# Patient Record
Sex: FEMALE | Race: OTHER | Employment: UNEMPLOYED | URBAN - METROPOLITAN AREA
[De-identification: names, ages, dates, MRNs, and addresses within clinical notes are randomized per-mention and may not be internally consistent; named-entity substitution may affect disease eponyms.]

---

## 2017-01-02 ENCOUNTER — OFFICE VISIT (OUTPATIENT)
Dept: FAMILY MEDICINE CLINIC | Age: 62
End: 2017-01-02

## 2017-01-02 ENCOUNTER — HOSPITAL ENCOUNTER (OUTPATIENT)
Dept: LAB | Age: 62
Discharge: HOME | End: 2017-01-02

## 2017-01-02 VITALS
SYSTOLIC BLOOD PRESSURE: 153 MMHG | HEART RATE: 79 BPM | TEMPERATURE: 98.2 F | DIASTOLIC BLOOD PRESSURE: 60 MMHG | HEIGHT: 60 IN | BODY MASS INDEX: 38.48 KG/M2 | WEIGHT: 196 LBS

## 2017-01-02 DIAGNOSIS — I10 ESSENTIAL HYPERTENSION: ICD-10-CM

## 2017-01-02 DIAGNOSIS — R60.9 EDEMA, UNSPECIFIED TYPE: ICD-10-CM

## 2017-01-02 DIAGNOSIS — K74.60 CIRRHOSIS OF LIVER WITH ASCITES, UNSPECIFIED HEPATIC CIRRHOSIS TYPE (HCC): Primary | ICD-10-CM

## 2017-01-02 DIAGNOSIS — Z71.89 COUNSELING AND COORDINATION OF CARE: Primary | ICD-10-CM

## 2017-01-02 DIAGNOSIS — Z13.9 SCREENING: ICD-10-CM

## 2017-01-02 DIAGNOSIS — R18.8 CIRRHOSIS OF LIVER WITH ASCITES, UNSPECIFIED HEPATIC CIRRHOSIS TYPE (HCC): Primary | ICD-10-CM

## 2017-01-02 LAB
ANION GAP BLD CALC-SCNC: 8 MMOL/L (ref 5–15)
BUN SERPL-MCNC: 14 MG/DL (ref 6–20)
BUN/CREAT SERPL: 27 (ref 12–20)
CALCIUM SERPL-MCNC: 8 MG/DL (ref 8.5–10.1)
CHLORIDE SERPL-SCNC: 113 MMOL/L (ref 97–108)
CO2 SERPL-SCNC: 22 MMOL/L (ref 21–32)
CREAT SERPL-MCNC: 0.52 MG/DL (ref 0.55–1.02)
GLUCOSE SERPL-MCNC: 93 MG/DL (ref 65–100)
HGB BLD-MCNC: 9.4 G/DL
POTASSIUM SERPL-SCNC: 4.1 MMOL/L (ref 3.5–5.1)
SODIUM SERPL-SCNC: 143 MMOL/L (ref 136–145)

## 2017-01-02 PROCEDURE — 80048 BASIC METABOLIC PNL TOTAL CA: CPT | Performed by: NURSE PRACTITIONER

## 2017-01-02 RX ORDER — MEGESTROL ACETATE 20 MG/1
20 TABLET ORAL 2 TIMES DAILY
COMMUNITY
Start: 2016-11-10 | End: 2017-01-02 | Stop reason: SDUPTHER

## 2017-01-02 RX ORDER — MEGESTROL ACETATE 20 MG/1
20 TABLET ORAL 2 TIMES DAILY
Qty: 60 TAB | Refills: 2 | Status: SHIPPED | OUTPATIENT
Start: 2017-01-02 | End: 2017-04-13

## 2017-01-02 RX ORDER — NADOLOL 40 MG/1
40 TABLET ORAL DAILY
Qty: 30 TAB | Refills: 2 | Status: SHIPPED | OUTPATIENT
Start: 2017-01-02 | End: 2017-02-02 | Stop reason: SDUPTHER

## 2017-01-02 RX ORDER — FUROSEMIDE 40 MG/1
40 TABLET ORAL DAILY
Qty: 30 TAB | Refills: 2 | Status: SHIPPED | OUTPATIENT
Start: 2017-01-02 | End: 2017-02-16 | Stop reason: SDUPTHER

## 2017-01-02 RX ORDER — SPIRONOLACTONE 100 MG/1
100 TABLET, FILM COATED ORAL DAILY
Qty: 30 TAB | Refills: 2 | Status: SHIPPED | OUTPATIENT
Start: 2017-01-02 | End: 2017-02-16 | Stop reason: SDUPTHER

## 2017-01-02 RX ORDER — NADOLOL 40 MG/1
40 TABLET ORAL DAILY
COMMUNITY
Start: 2016-11-10 | End: 2017-01-02 | Stop reason: SDUPTHER

## 2017-01-02 RX ORDER — SPIRONOLACTONE 100 MG/1
100 TABLET, FILM COATED ORAL DAILY
COMMUNITY
Start: 2016-11-10 | End: 2017-01-02 | Stop reason: SDUPTHER

## 2017-01-02 NOTE — PROGRESS NOTES
Met with Donnell Lee and explained the Care Card and gave her APA number to call for screening.   Pavithra Christie

## 2017-01-02 NOTE — PROGRESS NOTES
Printed AVS, provided to pt and reviewed. Pt indicated understanding and had no questions. Told pt that rx's have been sent to pharmacy and they should be ready for  in approximately 2 hrs. Please present GoodRx. com coupon which we provide to your pharmacy to receive discounted price. Told pt to ask about the care card which is our financial assistance program.  Also told pt that they will be required to do a financial screening and that they will receive a phone call from a company named [employer]. Advised them that they must talk to this company in order to qualify for the care card so it is very important not to avoid this phone call. Also told them that if they get a bill before they get their card to call the phone # on the bill to let them know they have applied for the Care Card. Charu ARTIS assisted with the info regarding the medical bills. Pt given Dr Abilio Smiley name and number and referral she was instructed to call and make an appt. The Hepatology office closed today for the Pol[place].  Lanny Canela RN

## 2017-01-02 NOTE — PROGRESS NOTES
Coordination of Care  1. Have you been to the ER, urgent care clinic since your last visit? Hospitalized since your last visit? Yes Reason for visit: [facility] Hx, Blood cancer, 11/2016    2. Have you seen or consulted any other health care providers outside of the [address] [place] [address] since your last visit? Include any pap smears or colon screening.  No    Medications  Medication Reconciliation Performed: no  Patient does not need refills     Learning Assessment Complete? yes  Results for orders placed or performed in visit on 01/02/17   AMB POC HEMOGLOBIN (HGB)   Result Value Ref Range    Hemoglobin (POC) 9.4

## 2017-01-02 NOTE — PROGRESS NOTES
Subjective:     Chief Complaint   Patient presents with    Medication Refill        She  is a 64 y.o. female who presents for evaluation of post hospitalization follow-up from ED visit. Pt reports she was transferred to [facility] for evaluation of possible ALL/vaginal bleeding. Additional workup revealed Pt's S&S were from Hep C cirrhosis and had a paracentesis done where they removed 2.5L. Pt notes she feels better since hospitalization but is still fatigued. No SOB/chest pain, N/V, coffee ground emesis nor dyspepsia. Expresses significant concern r/t to the medical bills. Pt is also requesting refills. ROS  Gen - no fever/chills  Resp - no dyspnea or cough  CV - no chest pain or CUENCA  Rest per HPI    Past Medical History   Diagnosis Date    Neurological disorder      Past Surgical History   Procedure Laterality Date    Hx cholecystectomy      Hx gyn            Current Outpatient Prescriptions on File Prior to Visit   Medication Sig Dispense Refill    furosemide (LASIX) 40 mg tablet Take 1 pill twice a day 60 Tab 1    potassium chloride (K-DUR, KLOR-CON) 10 mEq tablet Take 1 Tab by mouth two (2) times a day. 60 Tab 0    albuterol (PROVENTIL HFA, VENTOLIN HFA, PROAIR HFA) 90 mcg/actuation inhaler Take 2 Puffs by inhalation every six (6) hours as needed for Wheezing. 1 Inhaler 11    acetaminophen (TYLENOL ARTHRITIS PAIN) 650 mg CR tablet Take 1 Tab by mouth every eight (8) hours as needed for Pain. 30 Tab 1     No current facility-administered medications on file prior to visit.          Objective:     Vitals:    17 1119 17 1123   BP: 156/74 153/60   Pulse: 77 79   Temp: 98.2 °F (36.8 °C)    TempSrc: Oral    Weight: 196 lb (88.9 kg)    Height: 5' 0.43\" (1.535 m)        Physical Examination:  General appearance - alert, well appearing, and in no distress  Eyes -sclera anicteric  Neck - supple, no significant adenopathy, no thyromegaly, no bruits  Chest - clear to auscultation, no wheezes, rales or rhonchi, symmetric air entry  Heart - normal rate, regular rhythm, 2/6 systolic murmur noted, rubs, clicks or gallops  Neurological - alert, oriented, no focal findings or movement disorder noted  Abdomen- BS present x 4 quad, mild distention/ascites, no tenderness/organomegaly      Assessment/ Plan:   Maranda was seen today for medication refill. Diagnoses and all orders for this visit:    Cirrhosis of liver with ascites, unspecified hepatic cirrhosis type (HCC)  -     megestrol (MEGACE) 20 mg tablet; Take 1 Tab by mouth two (2) times a day. -     REFERRAL TO LIVER HEPATOLOGY    Screening  -     AMB POC HEMOGLOBIN (HGB)    Essential hypertension  -     furosemide (LASIX) 40 mg tablet; Take 1 Tab by mouth daily. -     spironolactone (ALDACTONE) 100 mg tablet; Take 1 Tab by mouth daily. -     nadolol (CORGARD) 40 mg tablet; Take 1 Tab by mouth daily.  -     METABOLIC PANEL, BASIC; Future    Edema, unspecified type  -     furosemide (LASIX) 40 mg tablet; Take 1 Tab by mouth daily. Repeat BMP. Last K+ at [facility] in Nov was WNL. Refill medications. Refer to Hepatology/Dr. Nikita Wong for evaluation of Hep C Tx.      RTC in 4-6 weeks for routine follow-up. Discuss bills w/ SW. I have discussed the diagnosis with the patient and the intended plan as seen in the above orders. The patient has received an after-visit summary and questions were answered concerning future plans. I have discussed medication side effects and warnings with the patient as well. The patient verbalizes understanding and agreement with the plan. Follow-up Disposition:  Return in about 4 weeks (around 1/30/2017).

## 2017-01-02 NOTE — PATIENT INSTRUCTIONS
Aprenda sobre la hepatitis C - [ Learning About Hepatitis C ]  ¿Qué es la hepatitis C? La hepatitis C es lam infección del hígado. Es causada por el virus de la hepatitis C. El virus se transmite a través de la fernanda y de los líquidos corporales infectados. La hepatitis C a menudo se transmite cuando lam persona comparte agujas infectadas utilizadas para inyectarse drogas ilegales. También se puede transmitir cuando lam persona utiliza lam aguja con fernanda infectada. [place] podría ocurrir al [employer] un tatuaje o \"piercing\". O podría suceder al ponerse lam inyección en algunos países en vías de desarrollo en donde se utilizan agujas más de lam vez para jerel inyecciones. En casos raros, lam madre con hepatitis C puede contagiar el virus a tam bebé en el momento del nacimiento. O un trabajador de atención médica puede exponerse accidentalmente a fernanda infectada con hepatitis C. Hay un riesgo muy pequeño de contraer el virus por contacto sexual. El riesgo es mayor si tam ayana sexual también tiene VIH u otra infección de transmisión sexual, o si tiene usted muchas parejas sexuales. Usted no puede infectarse de la hepatitis C debido a un contacto casual. [place] es un contacto ofelia abrazarse, besarse, estornudar, toser y compartir alimentos o bebidas. ¿Qué sucede cuando usted tiene hepatitis C? Algunas personas que contraen hepatitis C la tienen wesly un tiempo corto y Lorri Moreira. [place] se llama hepatitis C aguda. Roman la mayoría de las personas tienen hepatitis C crónica. [place] puede conducir a daño hepático, así ofelia a cirrosis, cáncer de hígado e insuficiencia hepática. Los expertos recomiendan que determinados grupos de personas se buddy la prueba para el virus. [place] incluye a personas que tienen señales de enfermedad del hígado o que alguna vez tejeda compartido agujas al usar drogas ilegales. Pregúntele a tam médico si hacerse la prueba es adecuado para usted.   También puede comprar lam prueba para hacerse en casa llamada \"Home Access Hepatitis C Check kit\" disponible en la mayoría de las farmacias. Si la prueba indica que usted ha estado expuesto al virus en el pasado, asegúrese de hablar con tam médico para averiguar si tiene el virus actualmente. ¿Cuáles son los síntomas? [address] con hepatitis C no tienen síntomas al principio. Los síntomas podrían incluir:  · Cansancio. · Dolor de felicitas. · Músculos adoloridos. · Náuseas. · Dolor en la parte superior derecha del abdomen. · Coloración amarillenta de la piel y de los ojos (ictericia). · Arvel Helms. ¿Cómo se puede prevenir la hepatitis C? No hay vacuna para prevenir la enfermedad. Cualquier persona que tenga hepatitis C puede transmitir el virus a otra persona. Usted puede monika medidas para reducir las probabilidades de infección. · No comparta agujas para inyectarse drogas. · [address] en un entorno de atención de [facility]. Use guantes y ropa de protección. Deseche adecuadamente agujas y otros objetos afilados. · Asegúrese de que todos los instrumentos y los suministros estén esterilizados si se va a hacer un tatuaje, un \"piercing\" o si le Azalee Hollingsworth a hacer acupuntura. Para evitar contagiar la hepatitis C, si la tiene:  · No comparta agujas ni otros enseres, ofelia algodón, cucharas y [place], si [place] agujas para inyectarse drogas. · Mantenga cubiertos los de la cruz, los raspones y las [place]. [place] prevendrá que otras personas entren en contacto con tam fernanda y otros líquidos corporales. Deseche cualquier artículo empapado de fernanda, ofelia, por ejemplo, vendas usadas. · No done fernanda ni semen. · Fatuma y to lo que haya estado en contacto con tam fernanda. Use jabón y [place]. · No comparta tam cepillo de dientes, navajas de afeitar, cortaúñas ni nada que pueda [employer]   · Use condones de látex al [employer] sexuales si tiene VIH, más de [place] ayana sexual o lam infección de transmisión sexual.  ¿Cómo se trata la hepatitis C?  · Si tiene hepatitis C aguda, tam médico probablemente le recete un medicamento. · Si tiene hepatitis C crónica, el tratamiento depende de si tiene daño hepático, otros problemas de sreekanth y la cantidad que tenga de virus en tam organismo y de qué tipo sea. · Usted tendrá que víctor a tam médico con regularidad para [employer] de fernanda a fin de revisarse el hígado. La atención de seguimiento es lam parte clave de tam tratamiento y seguridad. Asegúrese de hacer y acudir a todas las citas, y llame a tam médico si está teniendo problemas. También es lam buena idea saber los resultados de los exámenes y mantener lam lista de los medicamentos que bony. ¿Dónde puede encontrar más información en inglés? Shiva Naranjo a http://constance-brian.info/. Escriba C666 en la búsqueda para aprender más acerca de \"Aprenda sobre la hepatitis C - [ Learning About Hepatitis C ]. \"  Revisado: 24 olson, 2016  Versión del contenido: 11.1  © 9012-6290 Healthwise, Incorporated. Las instrucciones de cuidado fueron adaptadas bajo licencia por [employer] (which disclaims liability or warranty for this information). Si usted tiene [place] afección médica o sobre estas instrucciones, siempre pregunte a tam profesional de sreekanth. Healthwise, Incorporated niega toda garantía o responsabilidad por tam uso de esta información.

## 2017-01-03 NOTE — PROGRESS NOTES
Please let Pt know she does not need to keep taking PO KCL pills her current Lasix and aldactone should maintain the balance. As discussed at [address] this week, strongly encouraged her to make appt with [facility] for Hep C Tx.     Thank you,  Regina Florence

## 2017-01-12 NOTE — PROGRESS NOTES
Received a message from [facility] That the patient returned our call today and she confirmed the phone number as the one we have in [address] [place] [address]. Telephone call made to patient with the assistance of [employer]  #[phone]. We reviewed the provider's lab result note and the patient stated she has not scheduled an appointment with the Sofia Stone yet because she does not have the money and \"it's not bothering me right now. \" Faisal Lopez, [place]

## 2017-01-12 NOTE — PROGRESS NOTES
With the assistance of [employer]  #[id number], attempted to call patient to review lab result note. There was no answer, so a generic message was left to please call the [facility] office.  Delaney Anne RN

## 2017-01-25 ENCOUNTER — TELEPHONE (OUTPATIENT)
Dept: FAMILY MEDICINE CLINIC | Age: 62
End: 2017-01-25

## 2017-01-26 NOTE — TELEPHONE ENCOUNTER
Reviewed meds in Good Rx and w/ discount coupons/card the monthly cost will be approx $75.   Furosemide 40 mg disp 30 =$4, Spironolactone 100mg disp 30=$19.70, Nadolol 40 mg disp 30 = $40, Megestrol 20 mg disp 60 =$10. Attempted to reach  w/ pt. with assistance of [employer] phone  #[phone]. Pt did not answer and  message left w/ [facility] office number for call back.

## 2017-01-31 NOTE — TELEPHONE ENCOUNTER
Tc to the pt using [employer]  #[id number]. There was no answer. The pt was left a message to call the [facility].  Zoila Leventhal, RN

## 2017-02-02 ENCOUNTER — TELEPHONE (OUTPATIENT)
Dept: FAMILY MEDICINE CLINIC | Age: 62
End: 2017-02-02

## 2017-02-02 DIAGNOSIS — I10 ESSENTIAL HYPERTENSION: ICD-10-CM

## 2017-02-02 RX ORDER — NADOLOL 40 MG/1
40 TABLET ORAL DAILY
Qty: 30 TAB | Refills: 2 | Status: SHIPPED | OUTPATIENT
Start: 2017-02-02 | End: 2017-04-13 | Stop reason: SDUPTHER

## 2017-02-02 NOTE — TELEPHONE ENCOUNTER
Pt did not get the medicine Nadolol 40 mg because it was too expensive at approx $40 in [facility]. Asked if it could be sent to a cheaper pharmacy. Good Rx reviewed and Nadolol 40 mg disp 30 is approx $18 in [facility] and or [facility]. I sent to [email or web address] in [employer]. Given pharmacy address and phone number. Pt stated she still has [address] card. Instructed to call [facility] office nurse back for questions/problems. Call assisted by [employer] phone  #[phone].

## 2017-02-02 NOTE — TELEPHONE ENCOUNTER
Phone message left that [facility] office nurse was retuning call about medication problems. Call assisted by [employer] phone  #[phone].

## 2017-02-16 ENCOUNTER — OFFICE VISIT (OUTPATIENT)
Dept: FAMILY MEDICINE CLINIC | Age: 62
End: 2017-02-16

## 2017-02-16 ENCOUNTER — HOSPITAL ENCOUNTER (OUTPATIENT)
Dept: LAB | Age: 62
Discharge: HOME | End: 2017-02-16

## 2017-02-16 VITALS
WEIGHT: 200 LBS | DIASTOLIC BLOOD PRESSURE: 64 MMHG | BODY MASS INDEX: 38.5 KG/M2 | HEART RATE: 57 BPM | TEMPERATURE: 98.2 F | SYSTOLIC BLOOD PRESSURE: 185 MMHG | OXYGEN SATURATION: 100 %

## 2017-02-16 DIAGNOSIS — G89.29 CHRONIC PAIN OF BOTH KNEES: ICD-10-CM

## 2017-02-16 DIAGNOSIS — R60.9 EDEMA, UNSPECIFIED TYPE: ICD-10-CM

## 2017-02-16 DIAGNOSIS — I10 ESSENTIAL HYPERTENSION: ICD-10-CM

## 2017-02-16 DIAGNOSIS — R06.02 SHORTNESS OF BREATH: Primary | ICD-10-CM

## 2017-02-16 DIAGNOSIS — M25.561 CHRONIC PAIN OF BOTH KNEES: ICD-10-CM

## 2017-02-16 DIAGNOSIS — M25.562 CHRONIC PAIN OF BOTH KNEES: ICD-10-CM

## 2017-02-16 DIAGNOSIS — R06.02 SHORTNESS OF BREATH: ICD-10-CM

## 2017-02-16 LAB
ANION GAP BLD CALC-SCNC: 10 MMOL/L (ref 5–15)
BUN SERPL-MCNC: 18 MG/DL (ref 6–20)
BUN/CREAT SERPL: 31 (ref 12–20)
CALCIUM SERPL-MCNC: 7.8 MG/DL (ref 8.5–10.1)
CHLORIDE SERPL-SCNC: 114 MMOL/L (ref 97–108)
CO2 SERPL-SCNC: 19 MMOL/L (ref 21–32)
CREAT SERPL-MCNC: 0.59 MG/DL (ref 0.55–1.02)
GLUCOSE SERPL-MCNC: 92 MG/DL (ref 65–100)
POTASSIUM SERPL-SCNC: 4.6 MMOL/L (ref 3.5–5.1)
SODIUM SERPL-SCNC: 143 MMOL/L (ref 136–145)

## 2017-02-16 PROCEDURE — 80048 BASIC METABOLIC PNL TOTAL CA: CPT | Performed by: PHYSICIAN ASSISTANT

## 2017-02-16 RX ORDER — POTASSIUM CHLORIDE 750 MG/1
10 TABLET, EXTENDED RELEASE ORAL DAILY
Qty: 30 TAB | Refills: 2 | Status: SHIPPED | OUTPATIENT
Start: 2017-02-16 | End: 2017-03-23

## 2017-02-16 RX ORDER — FUROSEMIDE 40 MG/1
40 TABLET ORAL DAILY
Qty: 30 TAB | Refills: 2 | Status: SHIPPED | OUTPATIENT
Start: 2017-02-16 | End: 2017-04-10 | Stop reason: SDUPTHER

## 2017-02-16 RX ORDER — MELOXICAM 7.5 MG/1
7.5 TABLET ORAL DAILY
Qty: 30 TAB | Refills: 1 | Status: SHIPPED | OUTPATIENT
Start: 2017-02-16 | End: 2017-04-13 | Stop reason: SDUPTHER

## 2017-02-16 RX ORDER — SPIRONOLACTONE 100 MG/1
100 TABLET, FILM COATED ORAL DAILY
Qty: 30 TAB | Refills: 2 | Status: SHIPPED | OUTPATIENT
Start: 2017-02-16 | End: 2017-04-10 | Stop reason: SDUPTHER

## 2017-02-16 NOTE — PROGRESS NOTES
Coordination of Care  1. Have you been to the ER, urgent care clinic since your last visit? Hospitalized since your last visit? No    2. Have you seen or consulted any other health care providers outside of the [address] [place] [address] since your last visit? Include any pap smears or colon screening. No    Medications  Medication Reconciliation Performed: no  Patient does need refills     Learning Assessment Complete?  yes

## 2017-02-16 NOTE — PROGRESS NOTES
Assessment/Plan:    Maranda was seen today for F/up    Diagnoses and all orders for this visit:    Shortness of breath  -     METABOLIC PANEL, BASIC; Future  -     XR CHEST PA LAT; Future    Chronic pain of both knees  -     meloxicam (MOBIC) 7.5 mg tablet; Take 1 Tab by mouth daily. For knee pain    Edema, unspecified type  -     furosemide (LASIX) 40 mg tablet; Take 1 Tab by mouth daily. -     potassium chloride (K-DUR, KLOR-CON) 10 mEq tablet; Take 1 Tab by mouth daily. Essential hypertension  -     furosemide (LASIX) 40 mg tablet; Take 1 Tab by mouth daily. -     spironolactone (ALDACTONE) 100 mg tablet; Take 1 Tab by mouth daily. She misunderstood the instructions and stopped her kcl last month. Will check bmp today  Did not get echo or her appt with hepatology due to cost. Will explain care card in detail today so she can get the process started    Clinically, she appears much improved- her edema is down to trace edema pretibially, her lungs are clear on exam.   No change in meds. She has to get the nadolol from a different pharmacy due to cost.   F/up in 2 months or sooner prn    Follow-up Disposition:  Return in about 2 months (around 4/16/2017). Shazia Huizar PA-C  Maranda Sotero Ley expressed understanding of this plan. An AVS was printed and given to the patient.      ----------------------------------------------------------------------    Chief Complaint   Patient presents with    Hepatitis     follow up       History of Present Illness:  Here for f/up. No update from hepatology as she did not make the appt for fear of getting more bills. She also has not had her echo yet for the same reason. She reports that she is doing better. Her edema is improved significantly since starting the lasix. She is still having shortness of breath with exertion- i.e. Her apartment is on the top floor, this leaves her very short of breath for some time.   She is no longer bleeding, it was unclear to her where the bleeding was coming from but since she had the paracentesis, she has not had any more bleeding. Past Medical History   Diagnosis Date    Neurological disorder        Current Outpatient Prescriptions   Medication Sig Dispense Refill    meloxicam (MOBIC) 7.5 mg tablet Take 1 Tab by mouth daily. For knee pain 30 Tab 1    furosemide (LASIX) 40 mg tablet Take 1 Tab by mouth daily. 30 Tab 2    potassium chloride (K-DUR, KLOR-CON) 10 mEq tablet Take 1 Tab by mouth daily. 30 Tab 2    spironolactone (ALDACTONE) 100 mg tablet Take 1 Tab by mouth daily. 30 Tab 2    nadolol (CORGARD) 40 mg tablet Take 1 Tab by mouth daily. 30 Tab 2    megestrol (MEGACE) 20 mg tablet Take 1 Tab by mouth two (2) times a day. 60 Tab 2    albuterol (PROVENTIL HFA, VENTOLIN HFA, PROAIR HFA) 90 mcg/actuation inhaler Take 2 Puffs by inhalation every six (6) hours as needed for Wheezing. 1 Inhaler 11       Allergies   Allergen Reactions    Morphine Nausea and Vomiting       Social History   Substance Use Topics    Smoking status: Former Smoker     Quit date: 8/13/1996    Smokeless tobacco: Never Used    Alcohol use No       No family history on file.     Physical Exam:     Visit Vitals    /64 (BP 1 Location: Right arm, BP Patient Position: Sitting)    Pulse (!) 57    Temp 98.2 °F (36.8 °C) (Oral)    Wt 200 lb (90.7 kg)  Comment: with shoes    SpO2 100%    BMI 38.5 kg/m2     Wt up 4 lbs, bp elevated today  A&Ox3  WDWN NAD  Respirations normal and non labored  Lungs are CTA umm down to bases  Cor RRR s1s2 no S3 or s4  Ext- trace edema pre tibial umm, this is a significant improvement from when I last saw her

## 2017-02-16 NOTE — PROGRESS NOTES
Patient seen in discharge to review the AVS, prescriptions and pharmacy location. The patient states she already has a Good Rx coupon card. We also discussed the procedure for walk-in xrays and the patient was given the resource sheet for [facility] as well as the information sheet for the Care Card program. With her agreement, we scheduled an appointment for her with Dr. Waleska Machado, [address]. The first available appointment is April 10, 2017 which the patient accepted. She is aware to arrive by 3 pm for her 3:30 appointment and was given the address and phone number for the [facility]. She also understands that at the appointment they will ask her for a $50 copay, but will still see her if she is not able to pay that amount. The patient will go now to registration to schedule a provider follow-up appointment.  Reji Jones RN

## 2017-02-17 DIAGNOSIS — E83.51 HYPOCALCEMIA: Primary | ICD-10-CM

## 2017-02-17 NOTE — PROGRESS NOTES
Pt had decreased calcium on labs yesterday. She needs to have additional testing. Please call her and have her come in for lab only appt.  Thank you

## 2017-02-27 NOTE — PROGRESS NOTES
VM left for patient to call cav office back. (Per provider note MM, pt needs to rtc for labs only, needs to re-check calcium levels).

## 2017-03-09 ENCOUNTER — TELEPHONE (OUTPATIENT)
Dept: FAMILY MEDICINE CLINIC | Age: 62
End: 2017-03-09

## 2017-03-10 NOTE — TELEPHONE ENCOUNTER
Albanian-speaking patient (Int. [id number]) called again about refills. She said she tried asking [facility] to fax to us a request for a refill, but they did not understand [place]ian. She has an April appointment. Can we help her?     Cheng Rabago April

## 2017-03-13 NOTE — TELEPHONE ENCOUNTER
Attempted to reach pt on her phone x 2 which was answered and disconnected. Attempted to reach emergency contact who answered but connect bad and sounded like he was a work. Message - 1. Need a good address to send communication as a letter sent was returned. 2. Needs to return to a [facility] clinic for more blood work  3. Stop taking potassium per note from provider relab results. 4. Refills are available at her [facility] pharmacy she needs to ask for refill and/or present empty bottles.

## 2017-03-23 ENCOUNTER — TELEPHONE (OUTPATIENT)
Dept: FAMILY MEDICINE CLINIC | Age: 62
End: 2017-03-23

## 2017-03-23 DIAGNOSIS — R60.9 EDEMA, UNSPECIFIED TYPE: Primary | ICD-10-CM

## 2017-03-23 NOTE — TELEPHONE ENCOUNTER
Copied message from [employer] [place], [place], 3/23/17:  She can stop the potassium supplement and I will add a bmp to her labs for tomorrow. I will follow for the lab results. She should have one more bmp in 4 weeks to ensure that her electrolytes are stable. RN attempted call to pt at her home and also to 's cell phone to share above information. Left message of home phone to call [facility] office.   Sera Oh RN

## 2017-03-23 NOTE — TELEPHONE ENCOUNTER
Patient called and is returning a call she received patient is with her spouse at this time please contact the patient at [phone] cell phone    Patient did not mention reason for call only that she was returning a call from [place] [address].     Call routed to call back nurse and   [address] [place] [address]

## 2017-03-24 NOTE — TELEPHONE ENCOUNTER
RN called pt's home, no answer. RN called pt's , [place] (on [address]) on his cell phone with the assistance of  # [phone]. RN explained that per VIDHYA Saxena, pt may stop taking the potassium for now and of course return for the additional labs. Pt plans to come tomorrow to [facility]. RN also explained that pt will need a bmp repeat in 4 weeks. RN reminded pt's  of upcoming appts, 4/10/17 at the [address], and 4/13/17 at [facility]. He stated that he was aware of these appts.   Alysha Aguirre RN

## 2017-03-25 ENCOUNTER — CLINICAL SUPPORT (OUTPATIENT)
Dept: FAMILY MEDICINE CLINIC | Age: 62
End: 2017-03-25

## 2017-03-25 ENCOUNTER — HOSPITAL ENCOUNTER (OUTPATIENT)
Dept: LAB | Age: 62
Discharge: HOME | End: 2017-03-25

## 2017-03-25 DIAGNOSIS — R60.9 EDEMA, UNSPECIFIED TYPE: ICD-10-CM

## 2017-03-25 DIAGNOSIS — E83.51 HYPOCALCEMIA: ICD-10-CM

## 2017-03-25 DIAGNOSIS — Z13.9 SCREENING: Primary | ICD-10-CM

## 2017-03-25 PROCEDURE — 80048 BASIC METABOLIC PNL TOTAL CA: CPT | Performed by: PHYSICIAN ASSISTANT

## 2017-03-25 PROCEDURE — 82306 VITAMIN D 25 HYDROXY: CPT | Performed by: PHYSICIAN ASSISTANT

## 2017-03-25 PROCEDURE — 83735 ASSAY OF MAGNESIUM: CPT | Performed by: PHYSICIAN ASSISTANT

## 2017-03-25 PROCEDURE — 83970 ASSAY OF PARATHORMONE: CPT | Performed by: PHYSICIAN ASSISTANT

## 2017-03-25 NOTE — PROGRESS NOTES
Patient here for fasting labs only, labs drawn was a BMP, Magnesium, Vitamin D 25, and PTH Intect, lab drawn in the right arm, patient left lab with no bleeding, no pain, and feeling well. Savannah Zimmerman, Medical Assistant.

## 2017-03-26 LAB
25(OH)D3 SERPL-MCNC: <9 NG/ML (ref 30–100)
ANION GAP BLD CALC-SCNC: 10 MMOL/L (ref 5–15)
BUN SERPL-MCNC: 23 MG/DL (ref 6–20)
BUN/CREAT SERPL: 33 (ref 12–20)
CALCIUM SERPL-MCNC: 8.3 MG/DL (ref 8.5–10.1)
CALCIUM SERPL-MCNC: 8.3 MG/DL (ref 8.5–10.1)
CHLORIDE SERPL-SCNC: 115 MMOL/L (ref 97–108)
CO2 SERPL-SCNC: 17 MMOL/L (ref 21–32)
CREAT SERPL-MCNC: 0.7 MG/DL (ref 0.55–1.02)
GLUCOSE SERPL-MCNC: 99 MG/DL (ref 65–100)
MAGNESIUM SERPL-MCNC: 2.3 MG/DL (ref 1.6–2.4)
POTASSIUM SERPL-SCNC: 4.3 MMOL/L (ref 3.5–5.1)
PTH-INTACT SERPL-MCNC: 56.5 PG/ML (ref 14–72)
SODIUM SERPL-SCNC: 142 MMOL/L (ref 136–145)

## 2017-04-10 ENCOUNTER — OFFICE VISIT (OUTPATIENT)
Dept: HEMATOLOGY | Age: 62
End: 2017-04-10

## 2017-04-10 VITALS
SYSTOLIC BLOOD PRESSURE: 152 MMHG | DIASTOLIC BLOOD PRESSURE: 57 MMHG | HEART RATE: 78 BPM | HEIGHT: 60 IN | WEIGHT: 206.38 LBS | TEMPERATURE: 98.6 F | BODY MASS INDEX: 40.52 KG/M2 | OXYGEN SATURATION: 99 %

## 2017-04-10 DIAGNOSIS — R18.8 CIRRHOSIS OF LIVER WITH ASCITES, UNSPECIFIED HEPATIC CIRRHOSIS TYPE (HCC): Primary | ICD-10-CM

## 2017-04-10 DIAGNOSIS — I10 ESSENTIAL HYPERTENSION: ICD-10-CM

## 2017-04-10 DIAGNOSIS — B18.2 CHRONIC HEPATITIS C WITHOUT HEPATIC COMA (HCC): ICD-10-CM

## 2017-04-10 DIAGNOSIS — R18.8 CIRRHOSIS OF LIVER WITH ASCITES, UNSPECIFIED HEPATIC CIRRHOSIS TYPE (HCC): ICD-10-CM

## 2017-04-10 DIAGNOSIS — R60.9 EDEMA, UNSPECIFIED TYPE: ICD-10-CM

## 2017-04-10 DIAGNOSIS — K74.60 CIRRHOSIS OF LIVER WITH ASCITES, UNSPECIFIED HEPATIC CIRRHOSIS TYPE (HCC): Primary | ICD-10-CM

## 2017-04-10 DIAGNOSIS — K74.60 CIRRHOSIS OF LIVER WITH ASCITES, UNSPECIFIED HEPATIC CIRRHOSIS TYPE (HCC): ICD-10-CM

## 2017-04-10 RX ORDER — MEGESTROL ACETATE 20 MG/1
20 TABLET ORAL 2 TIMES DAILY
Qty: 60 TAB | Refills: 2 | OUTPATIENT
Start: 2017-04-10

## 2017-04-10 RX ORDER — SPIRONOLACTONE 100 MG/1
100 TABLET, FILM COATED ORAL DAILY
Qty: 90 TAB | Refills: 3 | Status: SHIPPED | OUTPATIENT
Start: 2017-04-10 | End: 2018-01-26 | Stop reason: SDUPTHER

## 2017-04-10 RX ORDER — FUROSEMIDE 40 MG/1
40 TABLET ORAL DAILY
Qty: 90 TAB | Refills: 3 | Status: SHIPPED | OUTPATIENT
Start: 2017-04-10 | End: 2018-01-26 | Stop reason: SDUPTHER

## 2017-04-10 NOTE — TELEPHONE ENCOUNTER
Pt called stating has f/u appt this Thursday but needs refill now on Megestrol please. Routing to provider SO. Called [facility] and she has refills on Vick Paddock but not the Megestrol.

## 2017-04-10 NOTE — PROGRESS NOTES
[address], MD, [facility]     April Andrea Coffey, MERLY Hernandez MD, [place], MD Tanya Dudley NP Wray Ogle, NP Laan Van Nieuw [facility] 142, [address]     [phone]     FAX: [address] [place] [address], [address] [place] [address], [address]     [phone]     FAX: [phone]         Patient Care Team:  Justin Simmons as PCP - General      Problem List  Date Reviewed: 4/10/2017          Codes Class Noted    Cirrhosis [facility]) ICD-10-CM: K74.60  ICD-9-CM: 571.5  4/10/2017        Ascites ICD-10-CM: R18.8  ICD-9-CM: 789.59  4/10/2017        Chronic hepatitis [facility] ([facility] ICD-10-CM: B18.2  ICD-9-CM: 070.54  1/2/2017        Obesity (BMI 30-39. 9) ICD-10-CM: E66.9  ICD-9-CM: 278.00  10/26/2016        Post-traumatic osteoarthritis of left hip ICD-10-CM: M16.52  ICD-9-CM: 715.25  10/26/2016        Primary osteoarthritis of right knee ICD-10-CM: M17.11  ICD-9-CM: 715.16  10/26/2016                The physicians listed above have asked me to see Carl Lizarraga in consultation regarding management of cirrhosis secondary to chronic HCV. All medical records sent by the referring physicians were reviewed including imaging studies     The patient is a 58 y.o.  female who was first found to have chronic HCV and cirrhosis in 2/2017 when she was hospitalized at [facility] for ascites. Risk factors for HCV are blood transfusions as a child in [facility]. An assessment of liver fibrosis with biopsy or elastography has not been performed. Ascites  has resolved with  Diuretics and paracentesis     The patient has not developed edema. The patient has not developed hepatic encephalopathy. The patient has not had been evaluated for varices.     The most recent laboratory studies indicate that the liver transaminases are normal, ALP is normal, tests of hepatic synthetic and metabolic function are normal, and the platelet count is depressed. The patient notes fatigue,     The patient has limitations in functional activities secondary to these symptoms. The patient has not experienced problems concentrating, swelling of the abdomen, swelling of the lower extremities, hematemesis, hematochezia. ALLERGIES  Allergies   Allergen Reactions    Morphine Nausea and Vomiting       MEDICATIONS  Current Outpatient Prescriptions   Medication Sig    meloxicam (MOBIC) 7.5 mg tablet Take 1 Tab by mouth daily. For knee pain    furosemide (LASIX) 40 mg tablet Take 1 Tab by mouth daily.  spironolactone (ALDACTONE) 100 mg tablet Take 1 Tab by mouth daily.  nadolol (CORGARD) 40 mg tablet Take 1 Tab by mouth daily.  megestrol (MEGACE) 20 mg tablet Take 1 Tab by mouth two (2) times a day.  albuterol (PROVENTIL HFA, VENTOLIN HFA, PROAIR HFA) 90 mcg/actuation inhaler Take 2 Puffs by inhalation every six (6) hours as needed for Wheezing. No current facility-administered medications for this visit. SYSTEM REVIEW NOT RELATED TO LIVER DISEASE OR REVIEWED ABOVE:  Constitution systems: Negative for fever, chills, weight gain, weight loss. Eyes: Negative for visual changes. ENT: Negative for sore throat, painful swallowing. Respiratory: Negative for cough, hemoptysis, SOB. Cardiology: Negative for chest pain, palpitations. GI:  Negative for constipation or diarrhea. : Negative for urinary frequency, dysuria, hematuria, nocturia. Skin: Negative for rash. Hematology: Negative for easy bruising, blood clots. Musculo-skelatal: Negative for back pain, muscle pain, weakness. Neurologic: Negative for headaches, dizziness, vertigo, memory problems not related to HE. Psychology: Negative for anxiety, depression. FAMILY HISTORY:  The father  of accident.     The patient has no knowledge of the mother's medical condition. There is no family history of liver disease. SOCIAL HISTORY:  The patient is . The patient has 1 child. The patient has never used tobacco products. The patient has never consumed significant amounts of alcohol. The patient used to work CNA. The patient has not worked since 2012. PHYSICAL EXAMINATION:  /57  Pulse 78  Temp 98.6 °F (37 °C) (Tympanic)   Ht 5' 0.43\" (1.535 m)  Wt 206 lb 6 oz (93.6 kg)  SpO2 99%  BMI 39.73 kg/m2  General: No acute distress. Eyes: Sclera anicteric. ENT: No oral lesions. Thyroid normal.  Nodes: No adenopathy. Skin: No spider angiomata. No jaundice. No palmar erythema. Respiratory: Lungs clear to auscultation. Cardiovascular: Regular heart rate. No murmurs. No JVD. Abdomen: Soft non-tender. Liver size normal to percussion/palpation. Spleen not palpable. No obvious ascites. Extremities: No edema. No muscle wasting. No gross arthritic changes. Neurologic: Alert and oriented. Cranial nerves grossly intact. No asterixis. LABORATORY STUDIES:  Liver [facility] Ref Rng & Units 4/10/2017   WBC 3.4 - 10.8 x10E3/uL 2.7 (L)   ANC 1.4 - 7.0 x10E3/uL 1.6   HGB 11.1 - 15.9 g/dL 8.5 (L)   HGB     HGB (iSTAT)      - 379 x10E3/uL 74 (LL)   INR 0.9 - 1.1      AST 0 - 40 IU/L 29   ALT 0 - 32 IU/L 30   Alk Phos 39 - 117 IU/L 116   Bili, Total 0.0 - 1.2 mg/dL 0.5   Bili, Direct 0.00 - 0.40 mg/dL 0.22   Albumin 3.6 - 4.8 g/dL 3.4 (L)   BUN 8 - 27 mg/dL 16   Creat 0.57 - 1.00 mg/dL 0.55 (L)   Na 134 - 144 mmol/L 141   K 3.5 - 5.2 mmol/L 4.8   Cl 96 - 106 mmol/L 111 (H)   CO2 18 - 29 mmol/L 16 (L)   Glucose 65 - 99 mg/dL 124 (H)   Magnesium 1.6 - 2.4 mg/dL      SEROLOGIES:  11/2016.   HCV Genotype 2    Serologies Latest Ref Rng & Units 4/10/2017   Hep A Ab, Total Negative Positive (A)   Hep B Surface Ag Negative Negative   Hep B Core Ab, Total Negative Negative   Hep B Surface AB QL  Non Reactive   HCV RT-PCR, Quant IU/mL [id number]     LIVER HISTOLOGY:  Not available or performed    ENDOSCOPIC PROCEDURES:  Not available or performed    RADIOLOGY:  11/2016. CT scan abdomen with IV contrast.  Changes consistent with cirrhosis. No liver mass lesions. No dilated bile ducts. Moderate ascites. OTHER TESTING:  Not available or performed    ASSESSMENT AND PLAN:  Cirrhosis secondary to chronic HCV     Have performed laboratory testing to monitor liver function and degree of liver injury. This included BMP, hepatic panel, CBC with platelet count,     Liver transaminases are normal.  Alkaline phosphatase is normal.  Liver function is normal.  The platelet count is depressed. The CTP is 6. Child class A. The MELD score is 12. The patient does not require liver transplant evaluation at this time. Ascites has resolved with current dose of diuretics. Will continue current dose of step 1 diuretics. Lower extremity edema has resolved with current dose of diuretics. Esophageal varices have not been previously assessed for with upper endoscopy. Will schedule for EGD to assess for varices and need for banding. Hepatic encephalopathy has not developed to date. There is no need for treatment with lactulose and/or Xifaxan at this time. No need to restrict dietary protein at this time. The patient has not previously been treated for HCV. The patient has HCV genotype 2. Discussed the treatment alternatives. The SVR/cure rate for HCV now exceeds 90% with just oral anti-viral therapy and no interferon injections or significant side effects for most patients with HCV. The specific treatment is dependent upon genotype, viral load and histology. The patient should be treated with Epclusa (sofosbuvir and valpitasvir) for 12 weeks. I do not think that Ribavirin is needed in this case. The SVR/cure rate for is over 99%.     The patient was directed to continue all current medications at the current dosages. There are no contraindications for the patient to take any medications that are necessary for treatment of other medical issues. The patient was counseled regarding alcohol consumption. Thrombocytopenia secondary to cirrhosis. There is no evidence of overt bleeding. There is no indication for platelet transfusions or pharmacologic treatment to increase the platelet count. Neutropenia secondary to cirrhosis. There is no evidence of infection. There is no indication for GCSF at this time. Anemia is probably from chronic GI blood loss from portal hypertension and iron deficiency. Will obtain iron panel to assess for iron stores. Bone density to assess for osteoporosis has not been performed. Vaccination for viral hepatitis B is recommended since the patient has no serologic evidence of previous exposure or vaccination with immunity. Vaccination for viral hepatitis A is not needed. The patient has serologic evidence of prior exposure or vaccination with immunity. [address] screening has recently been performed and does not suggest [address] The next liver imaging study will be performed in 5/2017. All of the above issues were discussed with the patient. All questions were answered. The patient expressed a clear understanding of the above. Follow-up [address] in for fibroscan and to initiate HCV treatment.      Kadie Valenzuela MD  Liver [place] of [address] [place] [address] [address] [facility] [address] [facility] [address].  [phone]

## 2017-04-10 NOTE — TELEPHONE ENCOUNTER
Patient used [phone]. She is in great need of a refill. She has a f/u appointment this coming Thursday. Please call to triage her medical condition and need of a medicine that she expresses is urgent?

## 2017-04-10 NOTE — MR AVS SNAPSHOT
Visit Information Alexx Ridleykamilaheunice Personal Médico Departamento Teléfono del Dep. Número de visita 4/10/2017  3:30 PM Maryam Etienne MD [facility] [address] [place] [address] [phone] Follow-up Instructions Return for NP for FS. Your Appointments 4/13/2017  2:00 PM  
Follow Up with Andres Huizar, [address] [facility] ([facility]) Appt Note: Follow up per MM by marycruz [address] [address]  
[phone]  
  
   
 [address] [facility] [address] [address] Upcoming Health Maintenance Date Due Hepatitis C Screening 1955 Pneumococcal 19-64 Highest Risk (1 of 3 - PCV13) 3/5/1974 DTaP/Tdap/Td series (1 - Tdap) 3/5/1976 PAP AKA CERVICAL CYTOLOGY 3/5/1976 BREAST CANCER SCRN MAMMOGRAM 3/5/2005 ZOSTER VACCINE AGE 60> 3/5/2015 FOBT Q 1 YEAR AGE 50-75 11/6/2017 Alergias  Review Complete El: 4/10/2017 Por: Cheryl Carvajal LPN A partir del:  4/10/2017 Intensidad Anotado Tipo de reacción [facility] [employer] Morphine  08/13/2016    Nausea and Vomiting Vacunas actuales [id number] el:  10/20/2016 Lina [place] Influenza Vaccine (Quad) PF 10/20/2016 No revisadas esta visita You Were Diagnosed With   
  
 Meka Santos Cirrhosis of liver with ascites, unspecified hepatic cirrhosis type    -  Primary ICD-10-CM: K74.60 ICD-9-CM: 571.5 Edema, unspecified type     ICD-10-CM: R60.9 ICD-9-CM: 782.3 Essential hypertension     ICD-10-CM: I10 
ICD-9-CM: 401.9 Chronic hepatitis C without hepatic coma (HCC)     ICD-10-CM: B18.2 ICD-9-CM: 070.54 Partes vitales PS Pulso Temperatura [place] ( percentil de crecimiento) Peso (percentil de crecimiento) SpO2  
 152/57 78 98.6 °F (37 °C) (Tympanic) 5' 0.43\" (1.535 m) 206 lb 6 oz (93.6 kg) 99% BMI [facility]) Estado obstétrico Estatus de tabaquísmo 39.73 kg/m2 Postmenopausal Former Smoker BMI and BSA Data Body Mass Index Body Surface Area  
 39.73 kg/m 2 2 m 2 [facility] Name Phone [address] [facility] Benji Stearns [phone] Tam lista de medicamentos actualizada Lista actualizada el: 4/10/17  4:15 PM.  Tory Finely use tam lista de medicamentos más reciente. albuterol 90 mcg/actuation inhaler También conocido ofelia:  PROVENTIL HFA, VENTOLIN HFA, PROAIR HFA Take 2 Puffs by inhalation every six (6) hours as needed for Wheezing. furosemide 40 mg tablet También conocido ofelia:  LASIX Take 1 Tab by mouth daily. megestrol 20 mg tablet También conocido ofelia:  MEGACE Take 1 Tab by mouth two (2) times a day. meloxicam 7.5 mg tablet También conocido ofelia:  MOBIC Take 1 Tab by mouth daily. For knee pain  
  
 nadolol 40 mg tablet También conocido ofelia:  CORGARD Take 1 Tab by mouth daily. spironolactone 100 mg tablet También conocido ofelia:  ALDACTONE Take 1 Tab by mouth daily. Recetas Enviado a la Arnold Refills  
 furosemide (LASIX) 40 mg tablet 3 Sig: Take 1 Tab by mouth daily. Class: Normal  
 Pharmacy: [place] [address] Ph #: [phone] Route: Oral  
 spironolactone (ALDACTONE) 100 mg tablet 3 Sig: Take 1 Tab by mouth daily. Class: Normal  
 Pharmacy: [address] Ph #: [phone] Route: Oral  
  
Hicimos lo siguiente CBC WITH AUTOMATED DIFF [15181 CPT(R)] HCV RNA BY JOHNATHAN QL,RFLX TO QT [32785 CPT(R)] HEP A AB, TOTAL [id number] CPT(R)] HEP B SURFACE AB [id number] CPT(R)] HEP B SURFACE AG [id number] CPT(R)] HEPATIC FUNCTION PANEL [48047 CPT(R)] HEPATITIS B CORE AB, TOTAL [id number] CPT(R)] METABOLIC PANEL, BASIC [11304 CPT(R)] Instrucciones de seguimiento Return for NP for FS. Por hacer 05/10/2017 GI:  EGD Introducing [facility]! [facility] introduce portal paciente MyChart . Ahora se puede acceder a partes de tam expediente médico, enviar por correo electrónico la oficina de tam médico y solicitar renovaciones de medicamentos en línea. En tam navegador de Internet , Sabino Human a [email or web address] Harjinder clic en el usuario por Alix Eric? Abdelrahman Vickey clic aquí en la sesión Lucille Magdaleno. Verá la página de registro [place]. Ingrese tam código de [facility] magalys y ofelia aparece a continuación. Usted no tendrá que [employer] código después de sukhdeep completado el proceso de registro . Si usted no se inscribe antes de la fecha de caducidad , debe solicitar un nuevo código. · MyChart Código de acceso : [id number] Expires: 7/9/2017  4:15 PM 
 
Ingresa los últimos cuatro dígitos de tam Número de Seguro Social ( xxxx ) y fecha de nacimiento ( dd / mm / aaaa ) ofelia se indica y harjinder clic en Enviar. Usted será llevado a la siguiente página de registro . Crear un ID MyChart . Esta será tam ID de inicio de sesión de MyChart y no puede ser [place] , por lo que pensar en lam que es Ilona Overlie y fácil de recordar . Crear lam contraseña MyChart . Usted puede cambiar tam contraseña en cualquier momento . Ingrese tam Password Reset de preguntas y [place] . [place] se puede utilizar en un momento posterior si usted olvida tam contraseña. Introduzca tam dirección de correo electrónico . Kanwal Garcia recibirá lam notificación por correo electrónico cuando la nueva información está disponible en MyChart . Maverick davey en Registrarse. Montez Martinez víctor y descargar porciones de tam expediente médico. 
Harjinder petar en el enlace de descarga del menú Resumen para descargar lam copia portátil de tam información médica . Si tiene [employer] , por favor visite la sección de preguntas frecuentes del sitio web MyChart .  Recuerde, MyChart NO es que se utilizará para las [employer]. Para emergencias médicas , llame al 911 . Ahora disponible en tam iPhone y Android ! Por favor proporcione tameka resumen de la documentación de cuidado a tam próximo proveedor. Your primary care clinician is listed as Higinio Mcginnis. If you have any questions after today's visit, please call [phone].

## 2017-04-11 LAB
ALBUMIN SERPL-MCNC: 3.4 G/DL (ref 3.6–4.8)
ALP SERPL-CCNC: 116 IU/L (ref 39–117)
ALT SERPL-CCNC: 30 IU/L (ref 0–32)
AST SERPL-CCNC: 29 IU/L (ref 0–40)
BASOPHILS # BLD AUTO: 0 X10E3/UL (ref 0–0.2)
BASOPHILS NFR BLD AUTO: 0 %
BILIRUB DIRECT SERPL-MCNC: 0.22 MG/DL (ref 0–0.4)
BILIRUB SERPL-MCNC: 0.5 MG/DL (ref 0–1.2)
BUN SERPL-MCNC: 16 MG/DL (ref 8–27)
BUN/CREAT SERPL: 29 (ref 12–28)
CALCIUM SERPL-MCNC: 8.5 MG/DL (ref 8.7–10.3)
CHLORIDE SERPL-SCNC: 111 MMOL/L (ref 96–106)
CO2 SERPL-SCNC: 16 MMOL/L (ref 18–29)
CREAT SERPL-MCNC: 0.55 MG/DL (ref 0.57–1)
EOSINOPHIL # BLD AUTO: 0.1 X10E3/UL (ref 0–0.4)
EOSINOPHIL NFR BLD AUTO: 2 %
ERYTHROCYTE [DISTWIDTH] IN BLOOD BY AUTOMATED COUNT: 15.7 % (ref 12.3–15.4)
GLUCOSE SERPL-MCNC: 124 MG/DL (ref 65–99)
HAV AB SER QL IA: POSITIVE
HBV CORE AB SERPL QL IA: NEGATIVE
HBV SURFACE AB SER QL: NON REACTIVE
HBV SURFACE AG SERPL QL IA: NEGATIVE
HCT VFR BLD AUTO: 28.2 % (ref 34–46.6)
HGB BLD-MCNC: 8.5 G/DL (ref 11.1–15.9)
IMM GRANULOCYTES # BLD: 0 X10E3/UL (ref 0–0.1)
IMM GRANULOCYTES NFR BLD: 0 %
LYMPHOCYTES # BLD AUTO: 0.8 X10E3/UL (ref 0.7–3.1)
LYMPHOCYTES NFR BLD AUTO: 29 %
MCH RBC QN AUTO: 26.3 PG (ref 26.6–33)
MCHC RBC AUTO-ENTMCNC: 30.1 G/DL (ref 31.5–35.7)
MCV RBC AUTO: 87 FL (ref 79–97)
MONOCYTES # BLD AUTO: 0.2 X10E3/UL (ref 0.1–0.9)
MONOCYTES NFR BLD AUTO: 9 %
MORPHOLOGY BLD-IMP: ABNORMAL
NEUTROPHILS # BLD AUTO: 1.6 X10E3/UL (ref 1.4–7)
NEUTROPHILS NFR BLD AUTO: 60 %
PLATELET # BLD AUTO: 74 X10E3/UL (ref 150–379)
POTASSIUM SERPL-SCNC: 4.8 MMOL/L (ref 3.5–5.2)
PROT SERPL-MCNC: 6.7 G/DL (ref 6–8.5)
RBC # BLD AUTO: 3.23 X10E6/UL (ref 3.77–5.28)
SODIUM SERPL-SCNC: 141 MMOL/L (ref 134–144)
WBC # BLD AUTO: 2.7 X10E3/UL (ref 3.4–10.8)

## 2017-04-12 LAB
HCV RNA SERPL NAA+PROBE-ACNC: NORMAL IU/ML
HCV RNA SERPL NAA+PROBE-LOG IU: 5.92 LOG10 IU/ML
HCV RNA SERPL QL NAA+PROBE: POSITIVE
TEST INFORMATION: NORMAL

## 2017-04-12 NOTE — TELEPHONE ENCOUNTER
Per chart review, this med was last ordered by Delia Tavares on 01-02-17. Sending to him for review based on Dr. Azul Spencer reply.  Lwo Muro RN

## 2017-04-13 ENCOUNTER — HOSPITAL ENCOUNTER (OUTPATIENT)
Dept: LAB | Age: 62
Discharge: HOME | End: 2017-04-13

## 2017-04-13 ENCOUNTER — OFFICE VISIT (OUTPATIENT)
Dept: FAMILY MEDICINE CLINIC | Age: 62
End: 2017-04-13

## 2017-04-13 VITALS
SYSTOLIC BLOOD PRESSURE: 165 MMHG | TEMPERATURE: 98.3 F | BODY MASS INDEX: 38.43 KG/M2 | WEIGHT: 199.6 LBS | HEART RATE: 82 BPM | DIASTOLIC BLOOD PRESSURE: 73 MMHG

## 2017-04-13 DIAGNOSIS — D64.9 NORMOCYTIC ANEMIA: ICD-10-CM

## 2017-04-13 DIAGNOSIS — G89.29 CHRONIC PAIN OF BOTH KNEES: ICD-10-CM

## 2017-04-13 DIAGNOSIS — M25.562 CHRONIC PAIN OF BOTH KNEES: ICD-10-CM

## 2017-04-13 DIAGNOSIS — M25.561 CHRONIC PAIN OF BOTH KNEES: ICD-10-CM

## 2017-04-13 DIAGNOSIS — I10 ESSENTIAL HYPERTENSION: ICD-10-CM

## 2017-04-13 DIAGNOSIS — D64.9 NORMOCYTIC ANEMIA: Primary | ICD-10-CM

## 2017-04-13 RX ORDER — NADOLOL 40 MG/1
40 TABLET ORAL DAILY
Qty: 30 TAB | Refills: 2 | Status: SHIPPED | OUTPATIENT
Start: 2017-04-13 | End: 2017-08-17

## 2017-04-13 RX ORDER — MELOXICAM 7.5 MG/1
7.5 TABLET ORAL DAILY
Qty: 30 TAB | Refills: 1 | Status: SHIPPED | OUTPATIENT
Start: 2017-04-13 | End: 2017-04-19

## 2017-04-13 NOTE — PROGRESS NOTES
Assessment/Plan:    Maranda was seen today for f/up    Diagnoses and all orders for this visit:    Normocytic anemia  -     PATHOLOGIST REVIEW SMEARS; Future  Hemoglobin is 8.5 today with normal MCV. No acute bleeding today. + fatigue and CUENAC. Will do peripheral smears and follow for results. Essential hypertension  -     nadolol (CORGARD) 40 mg tablet; Take 1 Tab by mouth daily. Chronic pain of both knees  -     meloxicam (MOBIC) 7.5 mg tablet; Take 1 Tab by mouth daily. For knee pain    Discussed the megace Rx with my colleague  Mouna Sands NP who agrees to the following plan because I did not find any notes from [facility] indicating why she was prescribed this medication. Will re-request the records from [facility], until then hold on refills on the megace. Follow-up Disposition:  Return in about 6 weeks (around 5/25/2017). Ishmael Huizar PA-C  Maranda Tellez Roxane expressed understanding of this plan. An AVS was printed and given to the patient.      ----------------------------------------------------------------------    Chief Complaint   Patient presents with    Arthritis     f/u       History of Present Illness:    Pt here for recheck. Overall, she states that she is doing well and feels improved. She was happy to tell me that she will be getting the hepatitis medication to eradicate her virus. She also thinks that she is getting an endoscopy soon to evaluate for bleeding varices. Her f/up is scheduled for hepatology. She had labs 3 days ago which we have discussed today. She is not having any known, active bleeding. She does have shortness of breath with about 20 steps. To get to her apartment, she has to stop and rest while going up the stairs- it is not clear if a ground level apartment will be possible at this time. She has not had her medication today. She ran out of megace and mobic for her severe knee pain several days ago.  As mentioned above, it is not entirely clear why she is on megace but she states that this was started at [facility] after she had a gyn problem with bleeding. We did not discuss her vit d deficiency today  She is off of K+ and her K+ is normal  She brought in 7 bottles of medication today- 3 were spironolactone, 3 were furosemide and one was the empty mobic bottle. She does not know where or when she stopped the nalolol rx. Additionally, she states that she ran out of her megace 3 days ago. Past Medical History:   Diagnosis Date    Neurological disorder        Current Outpatient Prescriptions   Medication Sig Dispense Refill    nadolol (CORGARD) 40 mg tablet Take 1 Tab by mouth daily. 30 Tab 2    meloxicam (MOBIC) 7.5 mg tablet Take 1 Tab by mouth daily. For knee pain 30 Tab 1    furosemide (LASIX) 40 mg tablet Take 1 Tab by mouth daily. 90 Tab 3    spironolactone (ALDACTONE) 100 mg tablet Take 1 Tab by mouth daily. 90 Tab 3    albuterol (PROVENTIL HFA, VENTOLIN HFA, PROAIR HFA) 90 mcg/actuation inhaler Take 2 Puffs by inhalation every six (6) hours as needed for Wheezing. 1 Inhaler 11       Allergies   Allergen Reactions    Morphine Nausea and Vomiting       Social History   Substance Use Topics    Smoking status: Former Smoker     Quit date: 8/13/1996    Smokeless tobacco: Never Used    Alcohol use No       No family history on file.     Physical Exam:     Visit Vitals    /73 (BP 1 Location: Left arm, BP Patient Position: Sitting)    Pulse 82    Temp 98.3 °F (36.8 °C) (Oral)    Wt 199 lb 9.6 oz (90.5 kg)    BMI 38.43 kg/m2     Looks improved today  A&Ox3  WDWN NAD  Respirations normal and non labored  Lab Results   Component Value Date/Time    WBC 2.7 04/10/2017 12:00 PM    Hemoglobin (POC) 9.4 01/02/2017 12:55 PM    HGB 8.5 04/10/2017 12:00 PM    HCT 28.2 04/10/2017 12:00 PM    PLATELET 74 65/43/1627 12:00 PM    MCV 87 04/10/2017 12:00 PM     Lab Results   Component Value Date/Time    Sodium 141 04/10/2017 12:00 PM    Potassium 4.8 04/10/2017 12:00 PM    Chloride 111 04/10/2017 12:00 PM    CO2 16 04/10/2017 12:00 PM    Anion gap 10 03/25/2017 10:15 AM    Glucose 124 04/10/2017 12:00 PM    BUN 16 04/10/2017 12:00 PM    Creatinine 0.55 04/10/2017 12:00 PM    BUN/Creatinine ratio 29 04/10/2017 12:00 PM    GFR est  04/10/2017 12:00 PM    GFR est non- 04/10/2017 12:00 PM    Calcium 8.5 04/10/2017 12:00 PM    Bilirubin, total 0.5 04/10/2017 12:00 PM    AST (SGOT) 29 04/10/2017 12:00 PM    Alk.  phosphatase 116 04/10/2017 12:00 PM    Protein, total 6.7 04/10/2017 12:00 PM    Albumin 3.4 04/10/2017 12:00 PM    Globulin 4.0 11/06/2016 12:23 PM    A-G Ratio 0.6 11/06/2016 12:23 PM    ALT (SGPT) 30 04/10/2017 12:00 PM

## 2017-04-13 NOTE — MR AVS SNAPSHOT
Visit Information Saravanan Luther Personal Médico Departamento Teléfono del Dep. Número de visita 4/13/2017  2:00 PM Carmen Rakesh Hooper 104, 225[address] Surgeons  [id number] Follow-up Instructions Return in about 6 weeks (around 5/25/2017). Your Appointments 6/1/2017  2:30 PM  
PROCEDURE with Christophe Cooper MD  
[facility] ([address]) Appt Note: EGD  
 [address] [place] [address] [phone] Formerly [facility] [address]  
[address] [facility] [address] Elise[address] Upcoming Health Maintenance Date Due Pneumococcal 19-64 Highest Risk (1 of 3 - PCV13) 3/5/1974 DTaP/Tdap/Td series (1 - Tdap) 3/5/1976 PAP AKA CERVICAL CYTOLOGY 3/5/1976 BREAST CANCER SCRN MAMMOGRAM 3/5/2005 ZOSTER VACCINE AGE 60> 3/5/2015 FOBT Q 1 YEAR AGE 50-75 11/6/2017 Alergias  Review Complete El: 4/10/2017 Por: Ron Wright LPN A partir del:  4/13/2017 Intensidad Anotado Tipo de reacción [employer] Morphine  08/13/2016    Nausea and Vomiting Vacunas actuales [id number] el:  10/20/2016 Bandar Indigo Influenza Vaccine (Quad) PF 10/20/2016 No revisadas esta visita You Were Diagnosed With   
  
 Sam Wagner Normocytic anemia    -  Primary ICD-10-CM: D64.9 ICD-9-CM: 285.9 Essential hypertension     ICD-10-CM: I10 
ICD-9-CM: 401.9 Chronic pain of both knees     ICD-10-CM: M25.561, M25.562, G89.29 ICD-9-CM: 719.46, 338.29 Partes vitales PS Pulso Temperatura Peso (percentil de crecimiento) BMI (IMC) Estado obstétrico  
 165/73 (BP 1 Location: Left arm, BP Patient Position: Sitting) 82 98.3 °F (36.8 °C) (Oral) 199 lb 9.6 oz (90.5 kg) 38.43 kg/m2 Postmenopausal  
 Estatus de tabaquísmo Former Smoker Historial de signos vitales BMI and BSA Data  Body Mass Index Body Surface Area  
 38.43 kg/m 2 1.96 m 2  
  
  
 [facility] Name Phone [address] [facility] [place] Nissen [phone] Hinojosa lista de medicamentos actualizada Lista actualizada el: 4/13/17  3:51 PM.  Gilberto Avila use hinojosa lista de medicamentos más reciente. albuterol 90 mcg/actuation inhaler También conocido ofelia:  PROVENTIL HFA, VENTOLIN HFA, PROAIR HFA Take 2 Puffs by inhalation every six (6) hours as needed for Wheezing. furosemide 40 mg tablet También conocido ofelia:  LASIX Take 1 Tab by mouth daily. meloxicam 7.5 mg tablet También conocido ofelia:  MOBIC Take 1 Tab by mouth daily. For knee pain  
  
 nadolol 40 mg tablet También conocido ofelia:  CORGARD Take 1 Tab by mouth daily. spironolactone 100 mg tablet También conocido ofelia:  ALDACTONE Take 1 Tab by mouth daily. Recetas Enviado a la Arnold Refills  
 nadolol (CORGARD) 40 mg tablet 2 Sig: Take 1 Tab by mouth daily. Class: Normal  
 Pharmacy: [address] [facility]: [phone] Route: Oral  
 meloxicam (MOBIC) 7.5 mg tablet 1 Sig: Take 1 Tab by mouth daily. For knee pain  
 Class: Normal  
 Pharmacy: [facility] [address]  [address] Krystenaprileunice Ph #: [phone] Route: Oral  
  
Instrucciones de seguimiento Return in about 6 weeks (around 5/25/2017). Por hacer 05/26/2017 1:00 PM  
  Appointment with VIDHYA Esparza at [facility] ([phone]) Introducing \A [facility]ology of [place] [facility]\"" & HEALTH SERVICES! Praveen Huntley introduce portal paciente Bambihart . Ahora se puede acceder a partes de hinojosa expediente médico, enviar por correo electrónico la oficina de hinojosa médico y solicitar renovaciones de medicamentos en línea. En hinojosa navegador de Internet , Taran Patel a [email or web address] Sheeba clic en el usuario por Jaleesa Quiroga?  Dipika Chou clic aquí en la Jeane De [place]. Verá la página de registro [place]. Ingrese tam código de [employer] magalys y ofelia aparece a continuación. Usted no tendrá que [employer] código después de sukhdeep completado el proceso de registro . Si usted no se inscribe antes de la fecha de caducidad , debe solicitar un nuevo código. · MyChart Código de acceso : [id number] Expires: 7/9/2017  4:15 PM 
 
Ingresa los últimos cuatro dígitos de tam Número de Seguro Social ( xxxx ) y fecha de nacimiento ( dd / mm / aaaa ) ofelia se indica y sheeba clic en Enviar. Usted será llevado a la siguiente página de registro . Crear un ID MyChart . Esta será tam ID de inicio de sesión de MyChart y no puede ser [place] , por lo que pensar en lam que es Suezanne Filter y fácil de recordar . Crear lam contraseña MyChart . Usted puede cambiar tam contraseña en cualquier momento . Ingrese tam Password Reset de preguntas y [place] . [place] se puede utilizar en un momento posterior si usted olvida tam contraseña. Introduzca tam dirección de correo electrónico . Megan Comber recibirá lam notificación por correo electrónico cuando la nueva información está disponible en MyChart . Era Mortimer clic en Registrarse. Antony Box víctor y descargar porciones de tam expediente médico. 
Sheeba clic en el enlace de descarga del menú Resumen para descargar lam copia portátil de tam información médica . Si tiene [employer] , por favor visite la sección de preguntas frecuentes del sitio web MyChart . Recuerde, MyChart NO es que se utilizará para las necesidades urgentes. Para emergencias médicas , llame al 911 . Ahora disponible en tam iPhone y Android ! Por favor proporcione tameka resumen de la documentación de cuidado a tam próximo proveedor. Your primary care clinician is listed as Milind Pyle. If you have any questions after today's visit, please call [phone].

## 2017-04-13 NOTE — PROGRESS NOTES
At discharge station AVS was printed and reviewed with pt. Pt signed release to have medical record from most recent [facility] hospital stay faxed to [facility] 85 a request of [address]. Pt taken to registration to make rtc appointment to f/up with Dr. Lex Lew or Dr. Kristen Baker for next appointment per request of [address]. Labs drawn by Leonidas Nagel after she called [facility] and clarified that they could draw the labs needed today.  Anita Velázquez RN

## 2017-04-13 NOTE — PROGRESS NOTES
Coordination of Care  1. Have you been to the ER, urgent care clinic since your last visit? Hospitalized since your last visit? No    2. Have you seen or consulted any other health care providers outside of the [address] [place] [address] since your last visit? Include any pap smears or colon screening. No    Medications  Medication Reconciliation Performed: no  Patient does need refills     Learning Assessment Complete?  yes

## 2017-04-14 LAB — PERIPHERAL SMEAR,PSM: NORMAL

## 2017-04-19 DIAGNOSIS — N95.0 POST-MENOPAUSAL BLEEDING: Primary | ICD-10-CM

## 2017-04-19 RX ORDER — MEGESTROL ACETATE 20 MG/1
20 TABLET ORAL DAILY
Qty: 30 TAB | Refills: 1 | Status: SHIPPED | OUTPATIENT
Start: 2017-04-19 | End: 2017-08-17

## 2017-04-19 RX ORDER — TRAMADOL HYDROCHLORIDE 50 MG/1
TABLET ORAL
Qty: 30 TAB | Refills: 1 | OUTPATIENT
Start: 2017-04-19 | End: 2017-05-12 | Stop reason: SDUPTHER

## 2017-04-19 NOTE — TELEPHONE ENCOUNTER
I contacted pt to ask her to stop the meloxicam, and to let her know that the  will be calling to set her up for Gyn eval for PMB. No endometrial biopsy noted in [facility] notes and I couldn't find a Gyn consult while inpt. She has been off the megace for a week with no bleeding, and I discussed that, while we are not having her start the megace now, I am going to have it on hold at her pharmacy. If she begins to have any bleeding, she needs to restart it right away and contact us. I called in tramadol for pain. Has f/u with me in about 3 weeks.

## 2017-04-19 NOTE — PROGRESS NOTES
This pt needs an [facility] Gyn consult for further eval of the postmenopausal vag bleed. Looks like she just stopped the megace, could start bleeding again, and hb is already quite low. I am putting in the referral.  I would stop the meloxicam because of the liver dx, low hb, risk for GI bleed in stomach.

## 2017-05-01 ENCOUNTER — TELEPHONE (OUTPATIENT)
Dept: FAMILY MEDICINE CLINIC | Age: 62
End: 2017-05-01

## 2017-05-01 NOTE — TELEPHONE ENCOUNTER
[facility] called listed number and left voice message for patient to call me back.   Kathy Juarez

## 2017-05-02 NOTE — TELEPHONE ENCOUNTER
Chart review shows /, Monalisa Darby, reached out to pt yesterday 5/1/17 re referral process. Awaiting call back from pt.

## 2017-05-12 ENCOUNTER — HOSPITAL ENCOUNTER (OUTPATIENT)
Dept: LAB | Age: 62
Discharge: HOME | End: 2017-05-12

## 2017-05-12 ENCOUNTER — OFFICE VISIT (OUTPATIENT)
Dept: FAMILY MEDICINE CLINIC | Age: 62
End: 2017-05-12

## 2017-05-12 VITALS
TEMPERATURE: 98.7 F | SYSTOLIC BLOOD PRESSURE: 157 MMHG | WEIGHT: 204 LBS | BODY MASS INDEX: 39.28 KG/M2 | OXYGEN SATURATION: 99 % | DIASTOLIC BLOOD PRESSURE: 59 MMHG | HEART RATE: 63 BPM

## 2017-05-12 DIAGNOSIS — Z13.0 SCREENING FOR DEFICIENCY ANEMIA: Primary | ICD-10-CM

## 2017-05-12 DIAGNOSIS — Z13.0 SCREENING FOR DEFICIENCY ANEMIA: ICD-10-CM

## 2017-05-12 LAB
ALBUMIN SERPL BCP-MCNC: 3.4 G/DL (ref 3.5–5)
ALBUMIN/GLOB SERPL: 0.9 {RATIO} (ref 1.1–2.2)
ALP SERPL-CCNC: 118 U/L (ref 45–117)
ALT SERPL-CCNC: 35 U/L (ref 12–78)
ANION GAP BLD CALC-SCNC: 8 MMOL/L (ref 5–15)
AST SERPL W P-5'-P-CCNC: 25 U/L (ref 15–37)
BASOPHILS # BLD AUTO: 0 K/UL (ref 0–0.1)
BASOPHILS # BLD: 0 % (ref 0–1)
BILIRUB SERPL-MCNC: 0.8 MG/DL (ref 0.2–1)
BUN SERPL-MCNC: 14 MG/DL (ref 6–20)
BUN/CREAT SERPL: 24 (ref 12–20)
CALCIUM SERPL-MCNC: 7.9 MG/DL (ref 8.5–10.1)
CHLORIDE SERPL-SCNC: 113 MMOL/L (ref 97–108)
CO2 SERPL-SCNC: 22 MMOL/L (ref 21–32)
CREAT SERPL-MCNC: 0.59 MG/DL (ref 0.55–1.02)
EOSINOPHIL # BLD: 0.1 K/UL (ref 0–0.4)
EOSINOPHIL NFR BLD: 4 % (ref 0–7)
ERYTHROCYTE [DISTWIDTH] IN BLOOD BY AUTOMATED COUNT: 16.7 % (ref 11.5–14.5)
GLOBULIN SER CALC-MCNC: 3.9 G/DL (ref 2–4)
GLUCOSE SERPL-MCNC: 96 MG/DL (ref 65–100)
HCT VFR BLD AUTO: 31.1 % (ref 35–47)
HGB BLD-MCNC: 8.8 G/DL
HGB BLD-MCNC: 9.2 G/DL (ref 11.5–16)
IRON SATN MFR SERPL: 8 % (ref 20–50)
IRON SERPL-MCNC: 48 UG/DL (ref 35–150)
LYMPHOCYTES # BLD AUTO: 25 % (ref 12–49)
LYMPHOCYTES # BLD: 0.8 K/UL (ref 0.8–3.5)
MCH RBC QN AUTO: 26.4 PG (ref 26–34)
MCHC RBC AUTO-ENTMCNC: 29.6 G/DL (ref 30–36.5)
MCV RBC AUTO: 89.4 FL (ref 80–99)
MONOCYTES # BLD: 0.2 K/UL (ref 0–1)
MONOCYTES NFR BLD AUTO: 7 % (ref 5–13)
NEUTS SEG # BLD: 2.1 K/UL (ref 1.8–8)
NEUTS SEG NFR BLD AUTO: 64 % (ref 32–75)
PLATELET # BLD AUTO: 72 K/UL (ref 150–400)
POTASSIUM SERPL-SCNC: 4.4 MMOL/L (ref 3.5–5.1)
PROT SERPL-MCNC: 7.3 G/DL (ref 6.4–8.2)
RBC # BLD AUTO: 3.48 M/UL (ref 3.8–5.2)
SODIUM SERPL-SCNC: 143 MMOL/L (ref 136–145)
TIBC SERPL-MCNC: 570 UG/DL (ref 250–450)
WBC # BLD AUTO: 3.3 K/UL (ref 3.6–11)

## 2017-05-12 PROCEDURE — 85025 COMPLETE CBC W/AUTO DIFF WBC: CPT | Performed by: FAMILY MEDICINE

## 2017-05-12 PROCEDURE — 83540 ASSAY OF IRON: CPT | Performed by: FAMILY MEDICINE

## 2017-05-12 PROCEDURE — 83880 ASSAY OF NATRIURETIC PEPTIDE: CPT | Performed by: FAMILY MEDICINE

## 2017-05-12 PROCEDURE — 80053 COMPREHEN METABOLIC PANEL: CPT | Performed by: FAMILY MEDICINE

## 2017-05-12 RX ORDER — TRAMADOL HYDROCHLORIDE 50 MG/1
TABLET ORAL
Qty: 30 TAB | Refills: 1 | Status: SHIPPED | OUTPATIENT
Start: 2017-05-12 | End: 2017-08-17

## 2017-05-12 NOTE — PROGRESS NOTES
Coordination of Care  1. Have you been to the ER, urgent care clinic since your last visit? Hospitalized since your last visit? No    2. Have you seen or consulted any other health care providers outside of the [address] [place] [address] since your last visit? Include any pap smears or colon screening.  No    Medications  Medication Reconciliation Performed: no  Patient does not need refills     Learning Assessment Complete? yes     Results for orders placed or performed in visit on 05/12/17   AMB POC HEMOGLOBIN (HGB)   Result Value Ref Range    Hemoglobin (POC) 8.8

## 2017-05-12 NOTE — PROGRESS NOTES
AVS printed and reviewed. Instructed to call either Héctor Shane or isa garrido outreach/ to set up financial screening for [facility] GYN refirral. Instructed to get the CXR done that was ordered back in feb 2017. Reprinted order and instructed to go to either [facility] or [facility] out pt registration for CXR. Billing issues w/ hospital tests discussed and encouraged to apply for financial assistance w/ bills.

## 2017-05-12 NOTE — MR AVS SNAPSHOT
Visit Information Quevedo Jaguar Luther Personal Médico Departamento Teléfono del Dep. Número de visita 5/12/2017  1:15 PM Austin Perez MD [address] [id number] Follow-up Instructions Return for appt. Your Appointments 6/1/2017  2:30 PM  
PROCEDURE with Stefani Rondon MD  
[facility] ([address]) Appt Note: EGD  
 [address] [place] [address] [phone] [facility] [address]  
[address] [facility] [address] Upcoming Health Maintenance Date Due Pneumococcal 19-64 Highest Risk (1 of 3 - PCV13) 3/5/1974 DTaP/Tdap/Td series (1 - Tdap) 3/5/1976 PAP AKA CERVICAL CYTOLOGY 3/5/1976 BREAST CANCER SCRN MAMMOGRAM 3/5/2005 ZOSTER VACCINE AGE 60> 3/5/2015 INFLUENZA AGE 9 TO ADULT 8/1/2017 FOBT Q 1 YEAR AGE 50-75 11/6/2017 Alergias  Review Complete El: 5/12/2017 Por: Nissa Wandy A partir del:  5/12/2017 Intensidad Anotado Tipo de reacción [employer] Morphine  08/13/2016    Nausea and Vomiting Vacunas actuales [id number] el:  10/20/2016 Bethene Axe Influenza Vaccine (Quad) PF 10/20/2016 No revisadas esta visita You Were Diagnosed With   
  
 Gino Boom Screening for deficiency anemia    -  Primary ICD-10-CM: Z13.0 ICD-9-CM: V78.1 Partes vitales PS Pulso Temperatura Peso (percentil de crecimiento) SpO2 BMI (IMC)  
 157/59 (BP 1 Location: Left arm, BP Patient Position: Sitting) 63 98.7 °F (37.1 °C) (Oral) 204 lb (92.5 kg) 99% 39.28 kg/m2 Estado obstétrico Estatus de tabaquísmo Postmenopausal Former Smoker Historial de signos vitales BMI and BSA Data Body Mass Index Body Surface Area  
 39.28 kg/m 2 1.99 m 2 [facility] Name Phone [facility] [address] [place], [place] [address]uro Watts [phone] Hinojosa lista de medicamentos actualizada Lista actualizada el: 17  3:04 PM.  Yesika Simmons use hinojosa lista de medicamentos más reciente. albuterol 90 mcg/actuation inhaler También conocido ofelia:  PROVENTIL HFA, VENTOLIN HFA, PROAIR HFA Take 2 Puffs by inhalation every six (6) hours as needed for Wheezing. furosemide 40 mg tablet También conocido ofelia:  LASIX Take 1 Tab by mouth daily. megestrol 20 mg tablet También conocido ofelia:  MEGACE Take 1 Tab by mouth daily. nadolol 40 mg tablet También conocido ofelia:  CORGARD Take 1 Tab by mouth daily. spironolactone 100 mg tablet También conocido ofelia:  ALDACTONE Take 1 Tab by mouth daily. traMADol 50 mg tablet También conocido ofelia:  ULTRAM  
1 bid prn Impresion de recetas Refills  
 traMADol (ULTRAM) 50 mg tablet 1 Si bid prn Class: Print Hicimos lo siguiente AMB POC HEMOGLOBIN (HGB) [95545 CPT(R)] Instrucciones de seguimiento Return for appt. Por hacer 2017 1:00 PM  
  Appointment with VIDHYA Ames at [facility] ([phone]) Introducing [facility] SERVICES! [facility] introduce portal paciente MyChart . Ahora se puede acceder a partes de hinojosa expediente médico, enviar por correo electrónico la oficina de hinojosa médico y solicitar renovaciones de medicamentos en línea. En hinojosa navegador de Internet , Flores Quarto a [email or web address] Sheeba clic en el usuario por Ann Gaspar? Wilma Con clrobert aquí en la sesión Ce Cool. Verá la página de registro [place]. Ingrese hinojosa código de [employer] magalys y ofelia aparece a continuación. Usted no tendrá que [employer] código después de sukhdeep completado el proceso de registro . Si usted no se inscribe antes de la fecha de caducidad , debe solicitar un nuevo código. · MyChart Código de acceso : [id number] Expires: 2017  4:15 PM 
 
 Ingresa los últimos cuatro dígitos de tam Número de Seguro Social ( xxxx ) y fecha de nacimiento ( dd / mm / aaaa ) ofelia se indica y sheeba clic en Enviar. Usted será llevado a la siguiente página de registro . Crear un ID MyChart . Esta será tam ID de inicio de sesión de MyChart y no puede ser [place] , por lo que pensar en lam que es Junita [place] y fácil de recordar . Crear lam contraseña MyChart . Usted puede cambiar tam contraseña en cualquier momento . Ingrese tam Password Reset de preguntas y [place] . [place] se puede utilizar en un momento posterior si usted olvida tam contraseña. Introduzca tam dirección de correo electrónico . Kleber Rose recibirá lam notificación por correo electrónico cuando la nueva información está disponible en MyChart . Reina Barrow clic en Registrarse. Elpidio Nolan víctor y descargar porciones de tam expediente médico. 
Sheeba clic en el enlace de descarga del menú Resumen para descargar lam copia portátil de tam información médica . Si tiene [employer] , por favor visite la sección de preguntas frecuentes del sitio web MyChart . Recuerde, MyChart NO es que se utilizará para las necesidades urgentes. Para emergencias médicas , llame al 911 . Ahora disponible en tam iPhone y Android ! Por favor proporcione tameka resumen de la documentación de cuidado a tam próximo proveedor. Your primary care clinician is listed as Anjelica Melissa. If you have any questions after today's visit, please call [phone].

## 2017-05-12 NOTE — PROGRESS NOTES
HISTORY OF PRESENT ILLNESS  Maranda Simpson is a 58 y.o. female. HPI Comments: More CUENCA over the last several weeks, orthopneic, and itching for the last week. Started having vag bleeding shortly after stopping the megace but didn't want to restart it because it had made her gain weight, so restarted it just 2 weeks ago (about a week after the bleeding started). The bleeding is tapering off, uses about 2 pads a day. No [facility] gyn consult done although I asked on 4/17. Looks like we tried to reach her but she says her phone isn't working, using 's phone. Is sure that she had endometrial bx in [facility]'s 11/16 prior to being admitted to [facility] and seems to be a good historian. I can't find a report, nor in [facility] record. .  Pelvic U/s showed mildly thickened endometrium for age. Also reports that 5 years ago she was told she had a growth in left flank region that might be cancer, and she noticed lately that she has some bruising there. States the pharmacy didn't fill her tramadol, doesn't have daily leg pain anyway. H/o hip fx and leg length deformity. Anemia     Skin Problem         ROS    Physical Exam   Constitutional:   High BMI and wt yup 5 lb. Obvious leg length discrepency. Neck: No thyromegaly present. Cardiovascular: Normal rate, regular rhythm and normal heart sounds. Exam reveals no gallop and no friction rub. No murmur heard. No tachycardia. Pulmonary/Chest: Breath sounds normal.   Abdominal: Soft. She exhibits no distension and no mass. ASSESSMENT and PLAN  Hb stable by poc testin. Vag bleeding still needs w/u, soon. Continue the megace. Check cbc. Check bnp. To look for chf causing dyspnea. F/u 1 month. Call or go to ED if bleeding increases. Pruritis-- check lfts. Knee pain-- tramadol.

## 2017-05-13 LAB — BNP SERPL-MCNC: 19 PG/ML (ref 0–100)

## 2017-05-17 ENCOUNTER — TELEPHONE (OUTPATIENT)
Dept: FAMILY MEDICINE CLINIC | Age: 62
End: 2017-05-17

## 2017-05-17 RX ORDER — LANOLIN ALCOHOL/MO/W.PET/CERES
325 CREAM (GRAM) TOPICAL
Qty: 30 TAB | Refills: 3 | Status: SHIPPED | OUTPATIENT
Start: 2017-05-17 | End: 2017-08-17

## 2017-05-17 NOTE — TELEPHONE ENCOUNTER
Spoke with the pt regarding test results, which show improved anemia that is at least in part Fe def, nl BNP. She is not taking Fe although it's on her med list.  I asked her to start this and told her to contact us if she has any of the common side-effects, which I discussed. She has not been contacted about AN gyn referral, and does now have a working phone. Jeff Asher or Johanna, can you try to reach her today? We need to get gyn consult soon.

## 2017-05-17 NOTE — PROGRESS NOTES
Can one of you contact her, important gyn AN consult in. She has a functioning phone today as I was able to reach her.

## 2017-06-01 ENCOUNTER — DOCUMENTATION ONLY (OUTPATIENT)
Dept: HEMATOLOGY | Age: 62
End: 2017-06-01

## 2017-06-01 NOTE — PROGRESS NOTES
Patient came into the office to r/s her missed EGD procedure. Patient was given new procedure instructions and an appointment reminder.

## 2017-08-10 ENCOUNTER — HOSPITAL ENCOUNTER (OUTPATIENT)
Age: 62
Setting detail: OUTPATIENT SURGERY
Discharge: HOME | End: 2017-08-10
Attending: INTERNAL MEDICINE | Admitting: INTERNAL MEDICINE
Payer: SUBSIDIZED

## 2017-08-10 ENCOUNTER — ANESTHESIA (OUTPATIENT)
Dept: ENDOSCOPY | Age: 62
End: 2017-08-10
Payer: SUBSIDIZED

## 2017-08-10 ENCOUNTER — ANESTHESIA EVENT (OUTPATIENT)
Dept: ENDOSCOPY | Age: 62
End: 2017-08-10
Payer: SUBSIDIZED

## 2017-08-10 VITALS
SYSTOLIC BLOOD PRESSURE: 135 MMHG | TEMPERATURE: 98.6 F | HEIGHT: 60 IN | RESPIRATION RATE: 14 BRPM | HEART RATE: 88 BPM | BODY MASS INDEX: 40.5 KG/M2 | WEIGHT: 206.31 LBS | OXYGEN SATURATION: 97 % | DIASTOLIC BLOOD PRESSURE: 57 MMHG

## 2017-08-10 PROCEDURE — 77030014243 HC BND LIG VRCES BSC -D: Performed by: INTERNAL MEDICINE

## 2017-08-10 PROCEDURE — 76060000031 HC ANESTHESIA FIRST 0.5 HR: Performed by: INTERNAL MEDICINE

## 2017-08-10 PROCEDURE — 74011000250 HC RX REV CODE- 250

## 2017-08-10 PROCEDURE — 74011250636 HC RX REV CODE- 250/636: Performed by: INTERNAL MEDICINE

## 2017-08-10 PROCEDURE — 74011250636 HC RX REV CODE- 250/636

## 2017-08-10 PROCEDURE — 76040000019: Performed by: INTERNAL MEDICINE

## 2017-08-10 RX ORDER — FLUMAZENIL 0.1 MG/ML
0.2 INJECTION INTRAVENOUS
Status: DISCONTINUED | OUTPATIENT
Start: 2017-08-10 | End: 2017-08-10 | Stop reason: HOSPADM

## 2017-08-10 RX ORDER — LIDOCAINE HYDROCHLORIDE 20 MG/ML
INJECTION, SOLUTION EPIDURAL; INFILTRATION; INTRACAUDAL; PERINEURAL AS NEEDED
Status: DISCONTINUED | OUTPATIENT
Start: 2017-08-10 | End: 2017-08-10 | Stop reason: HOSPADM

## 2017-08-10 RX ORDER — HYDROMORPHONE HYDROCHLORIDE 1 MG/ML
1 INJECTION, SOLUTION INTRAMUSCULAR; INTRAVENOUS; SUBCUTANEOUS ONCE
Status: COMPLETED | OUTPATIENT
Start: 2017-08-10 | End: 2017-08-10

## 2017-08-10 RX ORDER — ATROPINE SULFATE 0.1 MG/ML
0.5 INJECTION INTRAVENOUS
Status: DISCONTINUED | OUTPATIENT
Start: 2017-08-10 | End: 2017-08-10 | Stop reason: HOSPADM

## 2017-08-10 RX ORDER — PROPOFOL 10 MG/ML
INJECTION, EMULSION INTRAVENOUS AS NEEDED
Status: DISCONTINUED | OUTPATIENT
Start: 2017-08-10 | End: 2017-08-10 | Stop reason: HOSPADM

## 2017-08-10 RX ORDER — EPINEPHRINE 0.1 MG/ML
1 INJECTION INTRACARDIAC; INTRAVENOUS
Status: DISCONTINUED | OUTPATIENT
Start: 2017-08-10 | End: 2017-08-10 | Stop reason: HOSPADM

## 2017-08-10 RX ORDER — SODIUM CHLORIDE 9 MG/ML
INJECTION, SOLUTION INTRAVENOUS
Status: DISCONTINUED | OUTPATIENT
Start: 2017-08-10 | End: 2017-08-10 | Stop reason: HOSPADM

## 2017-08-10 RX ORDER — SODIUM CHLORIDE 0.9 % (FLUSH) 0.9 %
5-10 SYRINGE (ML) INJECTION AS NEEDED
Status: DISCONTINUED | OUTPATIENT
Start: 2017-08-10 | End: 2017-08-10 | Stop reason: HOSPADM

## 2017-08-10 RX ORDER — SODIUM CHLORIDE 0.9 % (FLUSH) 0.9 %
5-10 SYRINGE (ML) INJECTION EVERY 8 HOURS
Status: DISCONTINUED | OUTPATIENT
Start: 2017-08-10 | End: 2017-08-10 | Stop reason: HOSPADM

## 2017-08-10 RX ORDER — SODIUM CHLORIDE 9 MG/ML
50 INJECTION, SOLUTION INTRAVENOUS CONTINUOUS
Status: DISCONTINUED | OUTPATIENT
Start: 2017-08-10 | End: 2017-08-10 | Stop reason: HOSPADM

## 2017-08-10 RX ORDER — NALOXONE HYDROCHLORIDE 0.4 MG/ML
0.4 INJECTION, SOLUTION INTRAMUSCULAR; INTRAVENOUS; SUBCUTANEOUS
Status: DISCONTINUED | OUTPATIENT
Start: 2017-08-10 | End: 2017-08-10 | Stop reason: HOSPADM

## 2017-08-10 RX ORDER — DEXTROMETHORPHAN/PSEUDOEPHED 2.5-7.5/.8
1.2 DROPS ORAL
Status: DISCONTINUED | OUTPATIENT
Start: 2017-08-10 | End: 2017-08-10 | Stop reason: HOSPADM

## 2017-08-10 RX ORDER — FENTANYL CITRATE 50 UG/ML
50-200 INJECTION, SOLUTION INTRAMUSCULAR; INTRAVENOUS
Status: DISCONTINUED | OUTPATIENT
Start: 2017-08-10 | End: 2017-08-10 | Stop reason: HOSPADM

## 2017-08-10 RX ORDER — MIDAZOLAM HYDROCHLORIDE 1 MG/ML
5-10 INJECTION, SOLUTION INTRAMUSCULAR; INTRAVENOUS
Status: DISCONTINUED | OUTPATIENT
Start: 2017-08-10 | End: 2017-08-10 | Stop reason: HOSPADM

## 2017-08-10 RX ADMIN — PROPOFOL 40 MG: 10 INJECTION, EMULSION INTRAVENOUS at 13:41

## 2017-08-10 RX ADMIN — SODIUM CHLORIDE: 9 INJECTION, SOLUTION INTRAVENOUS at 13:24

## 2017-08-10 RX ADMIN — PROPOFOL 60 MG: 10 INJECTION, EMULSION INTRAVENOUS at 13:38

## 2017-08-10 RX ADMIN — LIDOCAINE HYDROCHLORIDE 100 MG: 20 INJECTION, SOLUTION EPIDURAL; INFILTRATION; INTRACAUDAL; PERINEURAL at 13:35

## 2017-08-10 RX ADMIN — PROPOFOL 30 MG: 10 INJECTION, EMULSION INTRAVENOUS at 13:40

## 2017-08-10 RX ADMIN — PROPOFOL 60 MG: 10 INJECTION, EMULSION INTRAVENOUS at 13:35

## 2017-08-10 RX ADMIN — PROPOFOL 40 MG: 10 INJECTION, EMULSION INTRAVENOUS at 13:39

## 2017-08-10 RX ADMIN — PROPOFOL 10 MG: 10 INJECTION, EMULSION INTRAVENOUS at 13:36

## 2017-08-10 RX ADMIN — PROPOFOL 40 MG: 10 INJECTION, EMULSION INTRAVENOUS at 13:43

## 2017-08-10 RX ADMIN — HYDROMORPHONE HYDROCHLORIDE 1 MG: 1 INJECTION, SOLUTION INTRAMUSCULAR; INTRAVENOUS; SUBCUTANEOUS at 14:32

## 2017-08-10 RX ADMIN — PROPOFOL 30 MG: 10 INJECTION, EMULSION INTRAVENOUS at 13:42

## 2017-08-10 RX ADMIN — PROPOFOL 70 MG: 10 INJECTION, EMULSION INTRAVENOUS at 13:37

## 2017-08-10 NOTE — PROCEDURES
[address] MD Kap[place], [address]       Clau Estimable, NP    MERLY Joshi NP Vela Romp, NP        at [address], [address] [address]     [phone]     FAX: [phone]    at [address], [address] [facility], [address] [facility] [address]     [phone]     FAX: [phone]       UPPER ENDOSCOPY PROCEDURE NOTE    Loida Annika  1955    INDICATION: Cirrhosis. Screening for esophageal varices with variceal ligation if indicated. : Jessica Garner MD    ANESTHESIA/SEDATION: Propofol was administered by anesthesia    PROCEDURE DESCRIPTION:  Infomed consent was obtained from the patient for the procedure. All risks and benefits of the procedure explained. The patient was taken to the endoscopy suite and placed in the left lateral decubitus position. Following sequential administration of sedation to doses as indicated above the endoscope was inserted into the mouth and advanced under direct vision to the second portion of the duodenum. Careful inspection of upper gastrointestinal tract was made as the endoscope was inserted and withdrawn. Retroflexion of the endoscope to view of the cardia of the stomach was performed. After withdrawing the endoscope the banding devise was placed on the tip of the endoscope. The scope was then reinserted under direct inspection and advanced to the esophagus. Banding of esophageal varices was performed as described below. The scope was then removed. FINDINGS:  Esophagus:    Multiple large esophageal varices were identified. Banding of esophageal varices was performed. Excellent hemostasis was achieved after banding. Stomach: Moderate portal hypertensive gastropathy of the body of the stomach. No gastric varicies identified.     Duodenum:   Normal bulb and second portion    INTERVENTION:   10 bands placed were placed on esophageal varices. COMPLICATIONS: None. The patient tolerated the procedure well. EBL: Negligible. RECOMMENDATIONS:  Observe until discharge parameters are achieved. Liquid diet today. Soft food tomorrow. Resume general diet thereafter. Repeat endoscopy to reassess varices and need for additional banding in 4-6 weeks. Follow-up [facility] office as scheduled.       Lisa Vizcaino MD  8/10/2017  1:53 PM

## 2017-08-10 NOTE — IP AVS SNAPSHOT
[address] 
[phone] Patient: Rogelio Masters MRN: [id number] VCJ:6/2/5399 You are allergic to the following Allergen Reactions Morphine Nausea and Vomiting Recent Documentation Height Weight Breastfeeding? BMI OB Status Smoking Status 1.524 m 93.6 kg No 40.29 kg/m2 Postmenopausal Former Smoker Emergency Contacts Name Discharge Info Relation Home Work Mobile [address] CAREGIVER [3] Spouse [3] [phone] About your hospitalization You were admitted on:  August 10, 2017 You last received care in the:  [facility] ENDOSCOPY You were discharged on:  August 10, 2017 Unit phone number:  [phone] Why you were hospitalized Your primary diagnosis was:  Not on File Providers Seen During Your Hospitalizations Provider Role Specialty Primary office phone Gilberto Muñoz MD Attending Provider Hepatology [phone] Your Primary Care Physician (PCP) Primary Care Physician Office Phone Office Fax Deborah Hadley [phone] Follow-up Information Follow up With Details Comments Contact Info Leslie Rudolph MD   [address] [place] [address] 
[phone] Current Discharge Medication List  
  
CONTINUE these medications which have NOT CHANGED Dose & Instructions Dispensing Information Comments Morning Noon Evening Bedtime  
 ferrous sulfate 325 mg (65 mg iron) tablet Your last dose was: Your next dose is:    
   
   
 Dose:  325 mg Take 1 Tab by mouth Daily (before breakfast). Quantity:  30 Tab Refills:  3  
     
   
   
   
  
 furosemide 40 mg tablet Commonly known as:  LASIX Your last dose was: Your next dose is:    
   
   
 Dose:  40 mg Take 1 Tab by mouth daily. Quantity:  90 Tab Refills:  3 megestrol 20 mg tablet Commonly known as:  MEGACE Your last dose was: Your next dose is:    
   
   
 Dose:  20 mg Take 1 Tab by mouth daily. Quantity:  30 Tab Refills:  1  
     
   
   
   
  
 nadolol 40 mg tablet Commonly known as:  CORGARD Your last dose was: Your next dose is:    
   
   
 Dose:  40 mg Take 1 Tab by mouth daily. Quantity:  30 Tab Refills:  2  
     
   
   
   
  
 spironolactone 100 mg tablet Commonly known as:  ALDACTONE Your last dose was: Your next dose is:    
   
   
 Dose:  100 mg Take 1 Tab by mouth daily. Quantity:  90 Tab Refills:  3  
     
   
   
   
  
 traMADol 50 mg tablet Commonly known as:  ULTRAM  
   
Your last dose was: Your next dose is:    
   
   
 1 bid prn Quantity:  30 Tab Refills:  1 I am handing her this rx because the last time I called rx in, she says she was told it wasn't ready. Discharge Instructions 701 [facility] Friday, MD, Karen Santoyo, JAMIL Romero PA-C Bill High, JAMIL Field NP  
 
   at [address], [address] [facility] Mike  [address]. 
   [phone] FAX: [phone]    at [address], [address] [facility], [address] [place] [address] Street [address] 
   [phone] FAX: [phone] ENDOSCOPY WITH BANDING DISCHARGE INSTRUCTIONS Radha Solano 
1955 Date: 8/10/2017 DISCOMFORT: 
Use lozenges or warm salt water gargle for sore thoat Apply warm compress to IV site if red. If redness or soreness persists call the office. You may experience gas and bloating. Walking and belching will help relieve this. You may experience chest pain or discomfort or feel as though food is \"sticking\" in your food pipe for a few days after the procedure.  This is a normal feeling after banding of esophageal varices. DIET: 
Regular food may dislodge the bands placed on the varices. For this reason you should only have liquid for the rest of today. Eat only soft food that does not need to be chewed all day tomorrow. You may advance to your regular diet in 2 days. ACTIVITY: 
Spend the remainder of the day resting. Avoid any strenuous activity. You may not operate a vehicle for 12 hours. You may not engage in an occupation involving machinery or appliances for rest of today. Avoid making any critical decisions for at least 24 hour. Call the [address] [facility] if you have any of the following: 
Increasing chest or abdominal pain, nausea, vomiting, vomiting blood, abdominal distension or swelling, fever or chills, bloody discharge from nose or mouth or shortness of breath. Follow-up Instructions: 
Call Dr. Zana Alarcon for any questions or problems at the phone number listed above. If a biopsy was performed, you will be contacted by the office staff or Dr Spann Has within 1 week. If you have not heard from us by then you may call the office at the phone number listed above to inquire about the results. ENDOSCOPY FINDINGS: 
Multiple varices were found in the esophagus (food tube). 10 bands were placed to seal the varices and reduce the risk of bleeding. DISCHARGE SUMMARY from the Nurse: The following personal items collected during your admission are returned to you:  
Dental Appliance: Dental Appliances: None Vision: Visual Aid: None Hearing Aid:   
Jewelry:   
Clothing:   
Other Valuables:   
Valuables sent to safe:   
 
 
Discharge Orders None Introducing [facility] & HEALTH SERVICES! [facility] introduce portal paciente MyChart . Ahora se puede acceder a partes de tam expediente médico, enviar por correo electrónico la oficina de tam médico y solicitar renovaciones de medicamentos en línea.  
 
En tam navegador de Internet , Sotero Cummins a [email or web address] Harjinder clic en el usuario por Kait Galdamez? Redia Regulus clic aquí en la sesión Dauna Mais. Verá la página de registro [place]. Ingrese tam código de [employer] magalys y ofelia aparece a continuación. Usted no tendrá que [employer] código después de sukhdeep completado el proceso de registro . Si usted no se inscribe antes de la fecha de caducidad , debe solicitar un nuevo código. · MyChart Código de acceso : [id number] Expires: 11/8/2017  2:38 PM 
 
Ingresa los últimos cuatro dígitos de tam Número de Seguro Social ( xxxx ) y fecha de nacimiento ( dd / mm / aaaa ) ofelia se indica y harjinder clic en Enviar. Usted será llevado a la siguiente página de registro . Crear un ID MyCHardy . Esta será tam ID de inicio de sesión de MyChart y no puede ser [place] , por lo que pensar en lam que es Pearletha Senters y fácil de recordar . Crear lam contraseña MyChart . Usted puede cambiar tam contraseña en cualquier momento . Ingrese tam Password Reset de preguntas y [place] . [place] se puede utilizar en un momento posterior si usted olvida tam contraseña. Introduzca tam dirección de correo electrónico . [place] Fass recibirá lam notificación por correo electrónico cuando la nueva información está disponible en MyChart . Ajith Diggs clic en Registrarse. [place] víctor y descargar porciones de tam expediente médico. 
Harjinder clic en el enlace de descarga del menú Resumen para descargar lam copia portátil de tam información médica . Si tiene [employer] , por favor visite la sección de preguntas frecuentes del sitio web MyChart . Recuerde, MyChart NO es que se utilizará para las necesidades urgentes. Para emergencias médicas , baldev baxter 911 . Ahora disponible en tam iPhone y Android ! General Information Please provide this summary of care documentation to your next provider. Patient Signature:  ____________________________________________________________ Date:  ____________________________________________________________  
  
Ace Coup Provider Signature:  ____________________________________________________________ Date:  ____________________________________________________________  
  
  
   
  
[address] 
[phone] Patient: Mikala Gillespie MRN: [id number] QFH:3/2/4954 Tiene alergias a lo siguiente Amarilys Redhead Morphine Nausea and Vomiting Documentación recientes Height Weight Está amamantando? BMI [facility]) Estado obstétrico Estatus de tabaquísmo 1.524 m 93.6 kg No 40.29 kg/m2 Postmenopausal Former Smoker Emergency Contacts Name Discharge Info Relation Home Work Mobile [address] CAREGIVER [3] Spouse [3] [phone] Sobre hinojosa hospitalización Le admitieron el:  August 10, 2017 Hinojosa tratamiento más reciente fue el:  [address] [place] [address] ENDOSCOPY Le dieron de lucas el:  August 10, 2017 Número de teléfono de la unidad:  [phone] Por qué le ingresaron Hinojosa diagnosis primaria es:  No está archivado/a Proveedores de verse wesly justus hospitalizaciones Personal Médico Rol Especialidad Teléfono principal de la reji Tariq MD Attending Provider Hepatology [phone] Hinojosa médico de atención primaria (PCP ) Primary Care Physician Office Phone Office Fax Chidi Hancock [phone] Follow-up Information Follow up With Details Comments Contact Info Alicia Becker MD   [address] 
[phone] Aprobación de la gestión actual lista de medicamentos CONTINUAR estos medicamentos que no [place] Dosis e instrucciones Información de dispensación Comentarios Morning Noon Evening Bedtime  
 ferrous sulfate 325 mg (65 mg iron) tablet Your last dose was: Your next dose is: Dosis:  325 mg Take 1 Tab by mouth Daily (before breakfast). cantidad:  30 Tab  
recargas:  3  
     
   
   
   
  
 furosemide 40 mg tablet También conocido ofelia:  LASIX Your last dose was: Your next dose is:    
   
   
 Dosis:  40 mg Take 1 Tab by mouth daily. cantidad:  90 Tab  
recargas:  3  
     
   
   
   
  
 megestrol 20 mg tablet También conocido ofelia:  MEGACE Your last dose was: Your next dose is:    
   
   
 Dosis:  20 mg Take 1 Tab by mouth daily. cantidad:  30 Tab  
recargas:  1  
     
   
   
   
  
 nadolol 40 mg tablet También conocido ofelia:  CORGARD Your last dose was: Your next dose is:    
   
   
 Dosis:  40 mg Take 1 Tab by mouth daily. cantidad:  30 Tab  
recargas:  2  
     
   
   
   
  
 spironolactone 100 mg tablet También conocido ofelia:  ALDACTONE Your last dose was: Your next dose is:    
   
   
 Dosis:  100 mg Take 1 Tab by mouth daily. cantidad:  90 Tab  
recargas:  3  
     
   
   
   
  
 traMADol 50 mg tablet También conocido ofelia:  ULTRAM  
   
Your last dose was: Your next dose is:    
   
   
 1 bid prn  
 cantidad:  30 Tab  
recargas:  1 I am handing her this rx because the last time I called rx in, she says she was told it wasn't ready. Discharge Instructions [address] Dot MD Pat, Natalya Pringle, [facility] JAMIL Rodríguez PA-C Jaynie Copper, NP Rexanne Akin, NP  
 
   at [address], [address] Saul Lozano  22. 
   [phone] FAX: [phone]    at [address], [address] [facility] Model, [address] 
   [phone] FAX: [phone] ENDOSCOPY WITH BANDING DISCHARGE INSTRUCTIONS Megan Liang 
1955 Date: 8/10/2017 DISCOMFORT: 
 Use lozenges or warm salt water gargle for sore thoat Apply warm compress to IV site if red. If redness or soreness persists call the office. You may experience gas and bloating. Walking and belching will help relieve this. You may experience chest pain or discomfort or feel as though food is \"sticking\" in your food pipe for a few days after the procedure. This is a normal feeling after banding of esophageal varices. DIET: 
Regular food may dislodge the bands placed on the varices. For this reason you should only have liquid for the rest of today. Eat only soft food that does not need to be chewed all day tomorrow. You may advance to your regular diet in 2 days. ACTIVITY: 
Spend the remainder of the day resting. Avoid any strenuous activity. You may not operate a vehicle for 12 hours. You may not engage in an occupation involving machinery or appliances for rest of today. Avoid making any critical decisions for at least 24 hour. Call the [employer] [address] [place] [employer]-time if you have any of the following: 
Increasing chest or abdominal pain, nausea, vomiting, vomiting blood, abdominal distension or swelling, fever or chills, bloody discharge from nose or mouth or shortness of breath. Follow-up Instructions: 
Call Dr. Ketty Otero for any questions or problems at the phone number listed above. If a biopsy was performed, you will be contacted by the office staff or Dr Ketty Otero within 1 week. If you have not heard from us by then you may call the office at the phone number listed above to inquire about the results. ENDOSCOPY FINDINGS: 
Multiple varices were found in the esophagus (food tube). 10 bands were placed to seal the varices and reduce the risk of bleeding. DISCHARGE SUMMARY from the Nurse: The following personal items collected during your admission are returned to you:  
Dental Appliance: Dental Appliances: None Vision: Visual Aid: None Hearing Aid:   
Jewelry: Clothing:   
Other Valuables:   
Valuables sent to safe:   
 
 
Discharge Orders [employer] Introducing [facility] & HEALTH SERVICES! [facility] introduce portal paciente MyChart . Ahora se puede acceder a partes de tam expediente médico, enviar por correo electrónico la oficina de tam médico y solicitar renovaciones de medicamentos en línea. En tam navegador de Internet , Hellen Seayume a [email or web address] Harjinder clic en el usuario por Selina Zavaleta? Nonda Slates clic aquí en la sesión [facility]. Verá la página de registro [place]. Ingrese tam código de Bank  [place] magalys y ofelia aparece a continuación. Usted no tendrá que [employer] código después de sukhdeep completado el proceso de registro . Si usted no se inscribe antes de la fecha de caducidad , debe solicitar un nuevo código. · MyChart Código de acceso : [id number] Expires: 11/8/2017  2:38 PM 
 
Ingresa los últimos cuatro dígitos de tam Número de Seguro Social ( xxxx ) y fecha de nacimiento ( dd / mm / aaaa ) ofelia se indica y harjinder clic en Enviar. Usted será llevado a la siguiente página de registro . Crear un ID MyChart . Esta será tam ID de inicio de sesión de MyChart y no puede ser [place] , por lo que pensar en lam que es [employer] y fácil de recordar . Crear lam contraseña MyChart . Usted puede cambiar tam contraseña en cualquier momento . Ingrese tam Password Reset de preguntas y [place] . [place] se puede utilizar en un momento posterior si usted olvida tam contraseña. Introduzca tam dirección de correo electrónico . Liss Grullon recibirá lam notificación por correo electrónico cuando la nueva información está disponible en MyChart . Abeba davey en Registrarse. Shruti Coho víctor y descargar porciones de tam expediente médico. 
Harjinder petar en el enlace de descarga del menú Resumen para descargar lam copia portátil de tam información médica . Si tiene [employer] , por favor visite la sección de preguntas frecuentes del sitio web MyChart . Kena Qureshi NO es que se utilizará para las necesidades urgentes. Para emergencias médicas , llame al 911 . Ahora disponible en tam iPhone y Android ! General Information Please provide this summary of care documentation to your next provider. Patient Signature:  ____________________________________________________________ Date:  ____________________________________________________________  
  
Claudene Born Provider Signature:  ____________________________________________________________ Date:  ____________________________________________________________

## 2017-08-10 NOTE — DISCHARGE INSTRUCTIONS
[address] MD, Rachna Bose, [place], [place]       JAMIL Walton PA-C Earnstine Hilts, NP Gerhard Pardon, NP        at [address], [address] Saul Lozano  22.     [phone]     FAX: [phone]    at [address], [address] [place] [address], [address]     [phone]     FAX: [phone]       ENDOSCOPY WITH BANDING DISCHARGE INSTRUCTIONS    Luke Maria Teresa  1955  Date: 8/10/2017    DISCOMFORT:  Use lozenges or warm salt water gargle for sore thoat  Apply warm compress to IV site if red. If redness or soreness persists call the office. You may experience gas and bloating. Walking and belching will help relieve this. You may experience chest pain or discomfort or feel as though food is \"sticking\" in your food pipe for a few days after the procedure. This is a normal feeling after banding of esophageal varices. DIET:  Regular food may dislodge the bands placed on the varices. For this reason you should only have liquid for the rest of today. Eat only soft food that does not need to be chewed all day tomorrow. You may advance to your regular diet in 2 days. ACTIVITY:  Spend the remainder of the day resting. Avoid any strenuous activity. You may not operate a vehicle for 12 hours. You may not engage in an occupation involving machinery or appliances for rest of today. Avoid making any critical decisions for at least 24 hour. Call the [employer] of [address] [place] [address] if you have any of the following:  Increasing chest or abdominal pain, nausea, vomiting, vomiting blood, abdominal distension or swelling, fever or chills, bloody discharge from nose or mouth or shortness of breath. Follow-up Instructions:  Call Dr. Patrick Joseph for any questions or problems at the phone number listed above.   If a biopsy was performed, you will be contacted by the office staff or Dr Chucho Rawls within 1 week. If you have not heard from us by then you may call the office at the phone number listed above to inquire about the results. ENDOSCOPY FINDINGS:  Multiple varices were found in the esophagus (food tube). 10 bands were placed to seal the varices and reduce the risk of bleeding. DISCHARGE SUMMARY from the Nurse:   The following personal items collected during your admission are returned to you:   Dental Appliance: Dental Appliances: None  Vision: Visual Aid: None  Hearing Aid:    Jewelry:    Clothing:    Other Valuables:    Valuables sent to safe:

## 2017-08-10 NOTE — ANESTHESIA POSTPROCEDURE EVALUATION
Post-Anesthesia Evaluation and Assessment    Patient: Rogelio Masters MRN: [id number]  SSN: [id number]    YOB: 1955  Age: 58 y.o. Sex: female       Cardiovascular Function/Vital Signs  Visit Vitals    /57    Pulse 88    Temp 37 °C (98.6 °F)    Resp 14    Ht 5' (1.524 m)    Wt 93.6 kg (206 lb 5 oz)    SpO2 97%    Breastfeeding No    BMI 40.29 kg/m2       Patient is status post MAC anesthesia for Procedure(s):  ESOPHAGOGASTRODUODENOSCOPY (EGD)  ENDOSCOPIC BANDING OR LIGATION. Nausea/Vomiting: None    Postoperative hydration reviewed and adequate. Pain:  Pain Scale 1: Numeric (0 - 10) (08/10/17 1445)  Pain Intensity 1: 3 (08/10/17 1445)   Managed    Neurological Status: At baseline    Mental Status and Level of Consciousness: Arousable    Pulmonary Status:   O2 Device: Room air (08/10/17 1445)   Adequate oxygenation and airway patent    Complications related to anesthesia: None    Post-anesthesia assessment completed.  No concerns    Signed By: Meche Berumen MD     August 10, 2017

## 2017-08-10 NOTE — ANESTHESIA PREPROCEDURE EVALUATION
Anesthetic History   No history of anesthetic complications            Review of Systems / Medical History  Patient summary reviewed, nursing notes reviewed and pertinent labs reviewed    Pulmonary          Shortness of breath      Comments: C/o SOB   Neuro/Psych   Within defined limits           Cardiovascular                       GI/Hepatic/Renal       Hepatitis: type B    Liver disease    Comments: cirrhosis Endo/Other        Obesity, morbid obesity and arthritis     Other Findings            Physical Exam    Airway  Mallampati: II  TM Distance: > 6 cm  Neck ROM: normal range of motion   Mouth opening: Normal     Cardiovascular    Rhythm: regular  Rate: normal         Dental  No notable dental hx       Pulmonary  Breath sounds clear to auscultation               Abdominal  Abdominal exam normal       Other Findings            Anesthetic Plan    ASA: 3  Anesthesia type: MAC          Induction: Intravenous  Anesthetic plan and risks discussed with: Patient

## 2017-08-10 NOTE — PROGRESS NOTES

## 2017-08-10 NOTE — ROUTINE PROCESS
Shankar Sánchez  1955  [id number]    Situation:  Verbal report received from: RN Krzysztof Earl  Procedure: Procedure(s):  ESOPHAGOGASTRODUODENOSCOPY (EGD)  ENDOSCOPIC BANDING OR LIGATION    Background:    Preoperative diagnosis: [facility] [B18.1, K74.60]  Postoperative diagnosis: 1.- Esophageal Varices    :  Dr. Rogelio Reddy  Assistant(s): Endoscopy Technician-1: Joshua Buckner IV  Endoscopy RN-1: Oscar Rico RN    Specimens: * No specimens in log *  H. Pylori  no    Assessment:  Intra-procedure medications       Anesthesia gave intra-procedure sedation and medications, see anesthesia flow sheet yes    Intravenous fluids:  250 NS @ KVO     Vital signs stable yes    Abdominal assessment: round and soft yes    Recommendation:  Discharge patient per MD order yes.   Return to floor no  Family or Friend :   Permission to share finding with family or friend yes

## 2017-08-10 NOTE — H&P
[address] Rashmi Dick MD, Amarilis Luevano, [facility] [place], [place]       JAMIL Stevens PA-C Marveen Bend, NP Steffanie Ferrier, NP        at [address], [address] [address]     [phone]     FAX: [phone]    at [address], [id number] [place] [address]     [phone]     FAX: [phone]       PRE-PROCEDURE NOTE - EGD    H and P from last office visit reviewed. Allergies reviewed. Out-patient medicaton list reviewed. Patient Active Problem List   Diagnosis Code    Obesity (BMI 30-39. 9) E66.9    Post-traumatic osteoarthritis of left hip M16.52    Primary osteoarthritis of right knee M17.11    Chronic hepatitis C (HCC) B18.2    Cirrhosis (HCC) K74.60    Ascites R18.8       Allergies   Allergen Reactions    Morphine Nausea and Vomiting       No current facility-administered medications on file prior to encounter. Current Outpatient Prescriptions on File Prior to Encounter   Medication Sig Dispense Refill    nadolol (CORGARD) 40 mg tablet Take 1 Tab by mouth daily. 30 Tab 2    furosemide (LASIX) 40 mg tablet Take 1 Tab by mouth daily. 90 Tab 3    ferrous sulfate 325 mg (65 mg iron) tablet Take 1 Tab by mouth Daily (before breakfast). 30 Tab 3    traMADol (ULTRAM) 50 mg tablet 1 bid prn 30 Tab 1    megestrol (MEGACE) 20 mg tablet Take 1 Tab by mouth daily. 30 Tab 1    spironolactone (ALDACTONE) 100 mg tablet Take 1 Tab by mouth daily. 90 Tab 3       For EGD to assess for esophageal and gastric varices. Plan to perform banding if indicated based upon variceal size and appearance. The risks of the procedure were discussed with the patient. These included reaction to anesthesia, pain, perforation and bleeding. All questions were answered. The patient wishes to proceed with the procedure.     PHYSICAL EXAMINATION:  VS: per nursing note  General: No acute distress. Eyes: Sclera anicteric. ENT: No oral lesions. Thyroid normal.  Nodes: No adenopathy. Skin: No spider angiomata. No jaundice. No palmar erythema. Respiratory: Lungs clear to auscultation. Cardiovascular: Regular heart rate. No murmurs. No JVD. Abdomen: Soft non-tender, liver size normal to percussion/palpation. Spleen not palpable. No obvious ascites. Extremities: No edema. No muscle wasting. No gross arthritic changes. Neurologic: Alert and oriented. Cranial nerves grossly intact. No asterixis. MOST RECENT LABORATORY STUDIES:  Lab Results   Component Value Date/Time    WBC 3.3 05/12/2017 03:01 PM    Hemoglobin (POC) 8.8 05/12/2017 01:51 PM    HGB 9.2 05/12/2017 03:01 PM    HCT 31.1 05/12/2017 03:01 PM    PLATELET 72 07/44/2983 03:01 PM    MCV 89.4 05/12/2017 03:01 PM     Lab Results   Component Value Date/Time    INR 1.3 11/06/2016 02:21 PM    Prothrombin time 13.3 11/06/2016 02:21 PM       ASSESSMENT AND PLAN:  EGD to assess for esophageal and/or gastric varices. Conscious sedation with fentanyl and versed.     Governor MD Anette  Liver Crumpton [address], [facility] 7  [address] [facility] [address].  [phone]

## 2017-08-11 ENCOUNTER — PATIENT OUTREACH (OUTPATIENT)
Dept: FAMILY MEDICINE CLINIC | Age: 62
End: 2017-08-11

## 2017-08-11 NOTE — PROGRESS NOTES
[address] GOPAL Chavez Note. Francis Morton Was seen at [address] [place] [address] for EGD on 8/10/17      Language spoken: Venezuelan. Advance Care Planning:   Patient was offered the opportunity to discuss advance care planning:  no     Does patient have an Advance Directive:  no   If no, did you provide information on Caring Connections?  no     PCP/Specialist follow up: Patient scheduled to follow up with Marce Rosado NP on 17 at 1:30 PM .  Reviewed red flags with patient, and patient verbalizes understanding. Patient given an opportunity to ask questions. No other clinical/social/functional needs noted. The patient agrees to contact the PCP office for questions related to their healthcare. The patient expressed thanks, offered no additional questions and ended the call. Medication:   Performed medication reconciliation with patient, and patient verbalizes understanding of administration of home medications. There were no barriers to obtaining medications identified at this time. EDUCATION:  Soft food today, regular food tomorrow. FU visit with PCP. Support system:  patient    Discharge Instructions :  Reviewed discharge instructions with patient. Patient verbalizes understanding of discharge instructions and follow-up care. Red Flags:  esophageal varicies     Labs Reviewed:  No results for input(s): WBC, HGB, HCT, PLT, HGBEXT, HCTEXT, PLTEXT in the last 72 hours. Medical History:     Past Medical History:   Diagnosis Date    Arthritis     Shoulders, Hips, Knees, Ankles, Back    [facility]    Neurological disorder            Nurse Navigator(NN) contacted the patient by telephone to perform post same day procedure hospital discharge assessment. Verified  and address with patient as identifiers. Provided introduction to self, and explanation of the Nurses Navigator role. Diet:   Patient reports: Soft Diet     Activity:    Patient reports: mostly moving around the house.

## 2017-08-17 ENCOUNTER — HOSPITAL ENCOUNTER (INPATIENT)
Age: 62
LOS: 2 days | Discharge: HOME | DRG: 378 | End: 2017-08-20
Attending: STUDENT IN AN ORGANIZED HEALTH CARE EDUCATION/TRAINING PROGRAM | Admitting: HOSPITALIST
Payer: SUBSIDIZED

## 2017-08-17 ENCOUNTER — OFFICE VISIT (OUTPATIENT)
Dept: FAMILY MEDICINE CLINIC | Age: 62
End: 2017-08-17

## 2017-08-17 VITALS
SYSTOLIC BLOOD PRESSURE: 183 MMHG | WEIGHT: 200 LBS | HEART RATE: 70 BPM | DIASTOLIC BLOOD PRESSURE: 51 MMHG | TEMPERATURE: 98.9 F | BODY MASS INDEX: 39.06 KG/M2

## 2017-08-17 DIAGNOSIS — I10 ESSENTIAL HYPERTENSION: ICD-10-CM

## 2017-08-17 DIAGNOSIS — D69.6 THROMBOCYTOPENIA (HCC): ICD-10-CM

## 2017-08-17 DIAGNOSIS — K20.90 ESOPHAGITIS: ICD-10-CM

## 2017-08-17 DIAGNOSIS — D64.9 ANEMIA, UNSPECIFIED TYPE: ICD-10-CM

## 2017-08-17 DIAGNOSIS — D64.9 SYMPTOMATIC ANEMIA: Primary | ICD-10-CM

## 2017-08-17 DIAGNOSIS — I85.10 SECONDARY ESOPHAGEAL VARICES WITHOUT BLEEDING (HCC): Primary | ICD-10-CM

## 2017-08-17 DIAGNOSIS — I85.10 SECONDARY ESOPHAGEAL VARICES WITHOUT BLEEDING (HCC): ICD-10-CM

## 2017-08-17 DIAGNOSIS — D50.0 IRON DEFICIENCY ANEMIA DUE TO CHRONIC BLOOD LOSS: ICD-10-CM

## 2017-08-17 DIAGNOSIS — K74.60 CIRRHOSIS OF LIVER WITHOUT ASCITES, UNSPECIFIED HEPATIC CIRRHOSIS TYPE (HCC): ICD-10-CM

## 2017-08-17 DIAGNOSIS — B18.2 CHRONIC HEPATITIS C WITHOUT HEPATIC COMA (HCC): ICD-10-CM

## 2017-08-17 DIAGNOSIS — Z13.0 SCREENING FOR IRON DEFICIENCY ANEMIA: ICD-10-CM

## 2017-08-17 DIAGNOSIS — R13.10 DYSPHAGIA, UNSPECIFIED TYPE: ICD-10-CM

## 2017-08-17 DIAGNOSIS — R60.9 EDEMA, UNSPECIFIED TYPE: ICD-10-CM

## 2017-08-17 LAB
ALBUMIN SERPL-MCNC: 3.1 G/DL (ref 3.5–5)
ALBUMIN/GLOB SERPL: 0.8 {RATIO} (ref 1.1–2.2)
ALP SERPL-CCNC: 105 U/L (ref 45–117)
ALT SERPL-CCNC: 28 U/L (ref 12–78)
ANION GAP SERPL CALC-SCNC: 9 MMOL/L (ref 5–15)
AST SERPL-CCNC: 19 U/L (ref 15–37)
BASOPHILS # BLD: 0 K/UL (ref 0–0.1)
BASOPHILS NFR BLD: 1 % (ref 0–1)
BILIRUB SERPL-MCNC: 0.9 MG/DL (ref 0.2–1)
BUN SERPL-MCNC: 12 MG/DL (ref 6–20)
BUN/CREAT SERPL: 24 (ref 12–20)
CALCIUM SERPL-MCNC: 8.3 MG/DL (ref 8.5–10.1)
CHLORIDE SERPL-SCNC: 111 MMOL/L (ref 97–108)
CO2 SERPL-SCNC: 22 MMOL/L (ref 21–32)
CREAT SERPL-MCNC: 0.51 MG/DL (ref 0.55–1.02)
DIFFERENTIAL METHOD BLD: ABNORMAL
EOSINOPHIL # BLD: 0 K/UL (ref 0–0.4)
EOSINOPHIL NFR BLD: 2 % (ref 0–7)
ERYTHROCYTE [DISTWIDTH] IN BLOOD BY AUTOMATED COUNT: 18.4 % (ref 11.5–14.5)
FERRITIN SERPL-MCNC: 7 NG/ML (ref 8–252)
FERRITIN SERPL-MCNC: 7 NG/ML (ref 8–252)
GLOBULIN SER CALC-MCNC: 4 G/DL (ref 2–4)
GLUCOSE SERPL-MCNC: 104 MG/DL (ref 65–100)
HCT VFR BLD AUTO: 23.2 % (ref 35–47)
HEMOCCULT STL QL: NEGATIVE
HGB BLD-MCNC: 6.1 G/DL
HGB BLD-MCNC: 6.5 G/DL (ref 11.5–16)
IRON SATN MFR SERPL: 4 % (ref 20–50)
IRON SATN MFR SERPL: 5 % (ref 20–50)
IRON SERPL-MCNC: 23 UG/DL (ref 35–150)
IRON SERPL-MCNC: 24 UG/DL (ref 35–150)
LYMPHOCYTES # BLD: 0.5 K/UL (ref 0.8–3.5)
LYMPHOCYTES NFR BLD: 24 % (ref 12–49)
MCH RBC QN AUTO: 23.7 PG (ref 26–34)
MCHC RBC AUTO-ENTMCNC: 28 G/DL (ref 30–36.5)
MCV RBC AUTO: 84.7 FL (ref 80–99)
MONOCYTES # BLD: 0.2 K/UL (ref 0–1)
MONOCYTES NFR BLD: 7 % (ref 5–13)
NEUTS SEG # BLD: 1.5 K/UL (ref 1.8–8)
NEUTS SEG NFR BLD: 66 % (ref 32–75)
PLATELET # BLD AUTO: 63 K/UL (ref 150–400)
PLATELET COMMENTS,PCOM: ABNORMAL
POTASSIUM SERPL-SCNC: 4 MMOL/L (ref 3.5–5.1)
PROT SERPL-MCNC: 7.1 G/DL (ref 6.4–8.2)
RBC # BLD AUTO: 2.74 M/UL (ref 3.8–5.2)
RBC MORPH BLD: ABNORMAL
SODIUM SERPL-SCNC: 142 MMOL/L (ref 136–145)
TIBC SERPL-MCNC: 505 UG/DL (ref 250–450)
TIBC SERPL-MCNC: 519 UG/DL (ref 250–450)
WBC # BLD AUTO: 2.2 K/UL (ref 3.6–11)

## 2017-08-17 PROCEDURE — 86921 COMPATIBILITY TEST INCUBATE: CPT | Performed by: STUDENT IN AN ORGANIZED HEALTH CARE EDUCATION/TRAINING PROGRAM

## 2017-08-17 PROCEDURE — 86920 COMPATIBILITY TEST SPIN: CPT | Performed by: STUDENT IN AN ORGANIZED HEALTH CARE EDUCATION/TRAINING PROGRAM

## 2017-08-17 PROCEDURE — 82272 OCCULT BLD FECES 1-3 TESTS: CPT | Performed by: STUDENT IN AN ORGANIZED HEALTH CARE EDUCATION/TRAINING PROGRAM

## 2017-08-17 PROCEDURE — 99285 EMERGENCY DEPT VISIT HI MDM: CPT

## 2017-08-17 PROCEDURE — 86900 BLOOD TYPING SEROLOGIC ABO: CPT | Performed by: STUDENT IN AN ORGANIZED HEALTH CARE EDUCATION/TRAINING PROGRAM

## 2017-08-17 PROCEDURE — 82728 ASSAY OF FERRITIN: CPT | Performed by: STUDENT IN AN ORGANIZED HEALTH CARE EDUCATION/TRAINING PROGRAM

## 2017-08-17 PROCEDURE — 85025 COMPLETE CBC W/AUTO DIFF WBC: CPT | Performed by: EMERGENCY MEDICINE

## 2017-08-17 PROCEDURE — 86922 COMPATIBILITY TEST ANTIGLOB: CPT | Performed by: STUDENT IN AN ORGANIZED HEALTH CARE EDUCATION/TRAINING PROGRAM

## 2017-08-17 PROCEDURE — 74011250636 HC RX REV CODE- 250/636

## 2017-08-17 PROCEDURE — 36415 COLL VENOUS BLD VENIPUNCTURE: CPT | Performed by: EMERGENCY MEDICINE

## 2017-08-17 PROCEDURE — 99218 HC RM OBSERVATION: CPT

## 2017-08-17 PROCEDURE — 80053 COMPREHEN METABOLIC PANEL: CPT | Performed by: EMERGENCY MEDICINE

## 2017-08-17 PROCEDURE — 86870 RBC ANTIBODY IDENTIFICATION: CPT | Performed by: STUDENT IN AN ORGANIZED HEALTH CARE EDUCATION/TRAINING PROGRAM

## 2017-08-17 PROCEDURE — 83540 ASSAY OF IRON: CPT | Performed by: STUDENT IN AN ORGANIZED HEALTH CARE EDUCATION/TRAINING PROGRAM

## 2017-08-17 PROCEDURE — 86902 BLOOD TYPE ANTIGEN DONOR EA: CPT | Performed by: STUDENT IN AN ORGANIZED HEALTH CARE EDUCATION/TRAINING PROGRAM

## 2017-08-17 PROCEDURE — 74011250637 HC RX REV CODE- 250/637: Performed by: HOSPITALIST

## 2017-08-17 PROCEDURE — 96374 THER/PROPH/DIAG INJ IV PUSH: CPT

## 2017-08-17 PROCEDURE — 86905 BLOOD TYPING RBC ANTIGENS: CPT | Performed by: STUDENT IN AN ORGANIZED HEALTH CARE EDUCATION/TRAINING PROGRAM

## 2017-08-17 RX ORDER — HYDRALAZINE HYDROCHLORIDE 20 MG/ML
10 INJECTION INTRAMUSCULAR; INTRAVENOUS
Status: COMPLETED | OUTPATIENT
Start: 2017-08-17 | End: 2017-08-17

## 2017-08-17 RX ORDER — SPIRONOLACTONE 100 MG/1
100 TABLET, FILM COATED ORAL DAILY
Status: DISCONTINUED | OUTPATIENT
Start: 2017-08-18 | End: 2017-08-20 | Stop reason: HOSPADM

## 2017-08-17 RX ORDER — SODIUM CHLORIDE 9 MG/ML
250 INJECTION, SOLUTION INTRAVENOUS AS NEEDED
Status: DISCONTINUED | OUTPATIENT
Start: 2017-08-17 | End: 2017-08-17

## 2017-08-17 RX ORDER — DOCUSATE SODIUM 100 MG/1
100 CAPSULE, LIQUID FILLED ORAL 2 TIMES DAILY
Status: DISCONTINUED | OUTPATIENT
Start: 2017-08-17 | End: 2017-08-20 | Stop reason: HOSPADM

## 2017-08-17 RX ORDER — HYDRALAZINE HYDROCHLORIDE 20 MG/ML
INJECTION INTRAMUSCULAR; INTRAVENOUS
Status: COMPLETED
Start: 2017-08-17 | End: 2017-08-17

## 2017-08-17 RX ORDER — ACETAMINOPHEN 325 MG/1
650 TABLET ORAL
Status: DISCONTINUED | OUTPATIENT
Start: 2017-08-17 | End: 2017-08-20 | Stop reason: HOSPADM

## 2017-08-17 RX ORDER — FUROSEMIDE 40 MG/1
40 TABLET ORAL DAILY
Status: DISCONTINUED | OUTPATIENT
Start: 2017-08-18 | End: 2017-08-20 | Stop reason: HOSPADM

## 2017-08-17 RX ADMIN — HYDRALAZINE HYDROCHLORIDE 10 MG: 20 INJECTION INTRAMUSCULAR; INTRAVENOUS at 18:11

## 2017-08-17 RX ADMIN — ACETAMINOPHEN 650 MG: 325 TABLET, FILM COATED ORAL at 20:59

## 2017-08-17 RX ADMIN — DOCUSATE SODIUM 100 MG: 100 CAPSULE, LIQUID FILLED ORAL at 20:54

## 2017-08-17 NOTE — PROGRESS NOTES
Report called to [facility] and pt is going by car. Has a triage note from provider Supriya Dunn NP to present at ER.

## 2017-08-17 NOTE — CONSULTS
[address] Rashmi Dick MD, [place], [place], [place]       Dank Pierre, JAMIL Fields, JAMIL Murcia NP        at [address], [address] [address]  [address].     [phone]     FAX: [phone]    at [address], [address] [place] [address], [address]     [phone]     FAX: [phone]       HEPATOLOGY CONSULT NOTE  The patient is well known to me and regularly cared for at [address] [address]. 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 She was found to have chronic HCV and cirrhosis in 2/2017 when she was hospitalized at [facility] for ascites. Risk factors for HCV are blood transfusions as a child in [facility]. Ascites has resolved with Diuretics and paracentesis. She has not developed edema or hepatic encephalopathy. EGD in 8/2017 demonstrated multiple large esophageal varices which were banded. She was seen in the Critical access hospital today because of weakness. She was found to have a HB of 6 and was sent to the ED.     I have reviewed the Emergency room note, Notes by all other physicians who have seen the patient during this hospitalization to date. I have reviewed the allergies and the medications the patient was taking at home prior to this hospitalization. ASSESSMENT AND PLAN:  Cirrhosis  This is secondary to chronic HCV. The CTP score is 7, Child class A, MELD score 12. Chronic HCV  This needs to be treated. Would use Harvoni since she had prior decompensation with ascites. Anemia  This is probably from chronic GI blood loss. Will perform EGD in AM.  Keep NPO after MN. Obtain FE panel prior to transfusion. She is probably FE deficient and we need to give her IV FE. Thrombocytopenia  This is secondary to cirrhosis. No treatment needed. SYSTEM REVIEW:  Constitution systems: weakness.   Eyes: Negative for visual changes. ENT: Negative for sore throat, painful swallowing. Respiratory: Negative for cough, hemoptysis, SOB. Cardiology: Negative for chest pain, palpitations. GI:  Negative for constipation or diarrhea. : Negative for urinary frequency, dysuria, hematuria, nocturia. Skin: Negative for rash. Hematology: Negative for easy bruising, blood clots. Musculo-skelatal: Negative for back pain, muscle pain, weakness. Neurologic: Negative for headaches, dizziness, vertigo, memory problems not related to HE. Psychology: Negative for anxiety, depression. FAMILY HISTORY:  The father  of accident. The patient has no knowledge of the mother's medical condition. There is no family history of liver disease.       SOCIAL HISTORY:  The patient is . The patient has 1 child. The patient has never used tobacco products. The patient has never consumed significant amounts of alcohol. The patient used to work CNA. The patient has not worked since . PHYSICAL EXAMINATION:  VS: per nursing note  General:  No acute distress. Eyes:  Sclera anicteric. ENT:  No oral lesions. Thyroid normal.  Nodes:  No adenopathy. Skin:  Spider angiomata. No jaundice. Respiratory:  Lungs clear to auscultation. Cardiovascular:  Regular heart rate. Abdomen:  Soft non-tender, No obvious ascites. Extremities:  No lower extremity edema. Neurologic:  Alert and oriented. Cranial nerves grossly intact. No asterixis. LABORATORY:  Results for Tomfco Cramp (MRN [id number]) as of 2017 18:48   Ref.  Range 2017 15:54   WBC Latest Ref Range: 3.6 - 11.0 K/uL 2.2 (L)   HGB Latest Ref Range: 11.5 - 16.0 g/dL 6.5 (L)   PLATELET Latest Ref Range: 150 - 400 K/uL 63 (L)   Sodium Latest Ref Range: 136 - 145 mmol/L 142   Potassium Latest Ref Range: 3.5 - 5.1 mmol/L 4.0   Chloride Latest Ref Range: 97 - 108 mmol/L 111 (H)   CO2 Latest Ref Range: 21 - 32 mmol/L 22 Glucose Latest Ref Range: 65 - 100 mg/dL 104 (H)   BUN Latest Ref Range: 6 - 20 MG/DL 12   Creatinine Latest Ref Range: 0.55 - 1.02 MG/DL 0.51 (L)   Bilirubin, total Latest Ref Range: 0.2 - 1.0 MG/DL 0.9   Albumin Latest Ref Range: 3.5 - 5.0 g/dL 3.1 (L)   ALT (SGPT) Latest Ref Range: 12 - 78 U/L 28   AST Latest Ref Range: 15 - 37 U/L 19   Alk.  phosphatase Latest Ref Range: 45 - 117 U/L [facility] MD Alondra  Liver Mermentau of [address] [place], [place] [id number] Ankit Clement 7  [address] [facility] 22.  [phone]

## 2017-08-17 NOTE — PROGRESS NOTES
Subjective:     Chief Complaint   Patient presents with   [facility] Follow Up     f/u        She  is a 58 y.o. female who presents for [facility] visit post endoscopy. Since endoscopy, Pt has noted difficulty eating. Every time since Saturday (when cleared to eat post endoscopy last week) since Pt tries to eat, she has been vomiting approx every time she attempts food. Pt notes some increased SOB, and other than inability to eat. No near syncope and dizziness. Denies any coffee ground emesis nor mikal bleeding from rectum nor dark stools. ROS  Gen - no fever/chills  Resp - no dyspnea or cough  CV - no chest pain or CUENCA  Rest per HPI    Past Medical History:   Diagnosis Date    Arthritis     Shoulders, Hips, Knees, Ankles, Back    Liver disease 2017    Cirrohis    Neurological disorder      Past Surgical History:   Procedure Laterality Date    HX CHOLECYSTECTOMY      HX GYN           Current Outpatient Prescriptions on File Prior to Visit   Medication Sig Dispense Refill    ferrous sulfate 325 mg (65 mg iron) tablet Take 1 Tab by mouth Daily (before breakfast). 30 Tab 3    traMADol (ULTRAM) 50 mg tablet 1 bid prn 30 Tab 1    megestrol (MEGACE) 20 mg tablet Take 1 Tab by mouth daily. 30 Tab 1    nadolol (CORGARD) 40 mg tablet Take 1 Tab by mouth daily. 30 Tab 2    furosemide (LASIX) 40 mg tablet Take 1 Tab by mouth daily. 90 Tab 3    spironolactone (ALDACTONE) 100 mg tablet Take 1 Tab by mouth daily. 90 Tab 3     No current facility-administered medications on file prior to visit. Objective:     Vitals:    17 1412   BP: 183/51   Pulse: 70   Temp: 98.9 °F (37.2 °C)   TempSrc: Oral   Weight: 200 lb (90.7 kg)       Physical Examination:  General appearance - A&O, SOB w/ mild activity noted. Physical exam deferred       Assessment/ Plan:   Diagnoses and all orders for this visit:    1.  Secondary esophageal varices without bleeding (HCC)  -     REFERRAL TO EMERGENCY DEPARTMENT    2. Screening for iron deficiency anemia  -     AMB POC HEMOGLOBIN (HGB)    3. Anemia, unspecified type  -     REFERRAL TO EMERGENCY DEPARTMENT         Given Hx, symptoms in context of recent endoscopy where large varices were found in her esophagus and her Hgb that is 2.4 points lower than 2 months ago,  Will refer Pt to ED to r/o acute bleeding. Discussed life threatening nature of her labs/potential bleeding and urgency. Pt expressed understanding and agreed to go, will have her  drive her. CVAN will continue to follow for post ED/admission care. I have discussed the diagnosis with the patient and the intended plan as seen in the above orders. The patient has received an after-visit summary and questions were answered concerning future plans. I have discussed medication side effects and warnings with the patient as well. The patient verbalizes understanding and agreement with the plan. Follow-up Disposition:  Return if symptoms worsen or fail to improve.

## 2017-08-17 NOTE — ED TRIAGE NOTES
Pt. states she was sent by [facility] for low Hgb of 6.1. Recent hx of upper GI and dx'd with esophageal varices on 8/10 here at [address] [facility] [address]. Pt. only complains of dizziness.

## 2017-08-17 NOTE — PROGRESS NOTES
Coordination of Care  1. Have you been to the ER, urgent care clinic since your last visit? Hospitalized since your last visit? Yes When: august 2017 Where: [facility] Reason for visit: abd pain    2. Have you seen or consulted any other health care providers outside of the [facility] since your last visit? Include any pap smears or colon screening.  No    Medications  Medication Reconciliation Performed: no  Patient does not know need refills     Learning Assessment Complete? yes  Results for orders placed or performed in visit on 08/17/17   AMB POC HEMOGLOBIN (HGB)   Result Value Ref Range    Hemoglobin (POC) 6.1

## 2017-08-17 NOTE — ED NOTES
Pt 20G IV R-AC is patent and saline locked. Site is clean dry and intact. VSS. NSR. /88. O2 sats 99% on RA. Pt denies dizziness at this time.  Pt in stable condition at the time of transport to inpatient

## 2017-08-17 NOTE — ED NOTES
TC from blood bank stating pt's antibody test returned positive and it will be a little while before her blood can be sent.

## 2017-08-17 NOTE — ED PROVIDER NOTES
HPI Comments: 58 y.o. female with past medical history significant for liver cirrhosis, esophageal varices who presents accompanied by  for evaluation of abnormal lab results. Patient states she was told to come to the ER after her HGB at [place] [address] was 6.1. Patient states she has been feeling tired and weak but denies any known bleeding. Patient states she had chills last week but denies any since. Patient had an endoscopy with her hepatologist on 8/10/17 and states she's had issues eating since. Patient states she has been vomiting with eating, denying bloody or coffee ground emesis. Patient states she's had a transfusion in the past. Patient is not on iron supplements. Patient denies syncope or changes in her stool. There are no other acute medical concerns at this time. Social hx: former smoker, no alcohol  PCP: Vance Arce MD  Hepatologist: Paul Patel MD    Note written by Vicki Brewer, as dictated by Yessenia Woods MD 4:34 PM     The history is provided by the patient. No  was used. Past Medical History:   Diagnosis Date    Arthritis 2014    Shoulders, Hips, Knees, Ankles, Back    Esophageal varices (HCC)     Liver disease 2017    Cirrohis    Neurological disorder        Past Surgical History:   Procedure Laterality Date    HX CHOLECYSTECTOMY      HX GI      upper GI    HX GYN               History reviewed. No pertinent family history. Social History     Social History    Marital status:      Spouse name: N/A    Number of children: N/A    Years of education: N/A     Occupational History    Not on file.      Social History Main Topics    Smoking status: Former Smoker     Quit date: 1996    Smokeless tobacco: Never Used    Alcohol use No    Drug use: No    Sexual activity: Not on file     Other Topics Concern    Not on file     Social History Narrative         ALLERGIES: Morphine    Review of Systems Constitutional: Positive for fatigue. Negative for appetite change, chills and fever. HENT: Negative for congestion, rhinorrhea and sore throat. Respiratory: Negative for cough and shortness of breath. Cardiovascular: Negative for chest pain and leg swelling. Gastrointestinal: Positive for vomiting. Negative for abdominal pain, anal bleeding, blood in stool, constipation and nausea. Genitourinary: Negative for difficulty urinating and dysuria. Musculoskeletal: Negative for back pain and neck pain. Skin: Negative for rash and wound. Neurological: Positive for weakness. Negative for headaches. All other systems reviewed and are negative. Vitals:    08/17/17 1537   BP: 161/65   Pulse: 71   Resp: 18   Temp: 98.2 °F (36.8 °C)   SpO2: 99%   Weight: 93.4 kg (206 lb)   Height: 5' 4\" (1.626 m)            Physical Exam   Constitutional: She is oriented to person, place, and time. She appears well-developed. No distress. HENT:   Head: Normocephalic and atraumatic. Eyes: Conjunctivae and EOM are normal. Pupils are equal, round, and reactive to light. Neck: Normal range of motion. Neck supple. Cardiovascular: Normal rate, regular rhythm and normal heart sounds. No murmur heard. Pulmonary/Chest: Effort normal and breath sounds normal. No respiratory distress. Abdominal: Soft. Bowel sounds are normal. She exhibits no distension. There is no tenderness. There is no rebound. Genitourinary: Rectal exam shows external hemorrhoid (not bleeding). Rectal exam shows no mass. Genitourinary Comments: No bleeding noted upon rectal exam   Musculoskeletal: Normal range of motion. She exhibits no edema. Neurological: She is alert and oriented to person, place, and time. No cranial nerve deficit. She exhibits normal muscle tone. Coordination normal.   Skin: Skin is warm and dry. No rash noted. There is pallor. Psychiatric: She has a normal mood and affect.  Her behavior is normal.   Nursing note and vitals reviewed. Note written by Vicki Leon, as dictated by Rick Santillan MD 4:38 PM      [facility]  ED Course       Procedures    CONSULT NOTE:  5:14 PM Rick Santillan MD spoke with Dr. Elin Blandon, Consult for Hepatology. Discussed available diagnostic tests and clinical findings. He is in agreement with care plans as outlined. Dr. Elin Blandon agrees with admission for transfusion. He will scheduled an EGD today or tomorrow. CONSULT NOTE:  5:30 PM Rick Santillan MD spoke with Dr. Madeleine Meyers, Consult for Hospitalist.  Discussed available diagnostic tests and clinical findings. He is in agreement with care plans as outlined. Dr. Madeleine Meyers will admit the patient for observation, possibly pre- or post-EGD.

## 2017-08-17 NOTE — H&P
History & Physical    Date of admission: 2017    Patient name: Ricardo Villaseñor  MRN: [id number]  YOB: 1955  Age: 58 y.o. Primary care provider:  Monica Quintero MD     Source of Information: patient, medical records                             Chief complain: abnormal labs    History of present illness  Ricardo Villaseñor is a 58 y.o. female who presents with PMHx Hep C Cirrhosis, Thrombocytopenia, Esophageal Varices s/p Banding 8/10/17,  Arthritis admitted for abnormal labs. Pt was found to have Hb 6.1. Pt denies feeling tired and weak, no hematemesis or melena, vaginal bleeding, RC, SOb, chest pain, dizziness, lightheadedness. Pt s/p EGD on 8/10 found to have Esophageal varices s/p banding by  on 8/10. Past Medical History:   Diagnosis Date    Arthritis     Shoulders, Hips, Knees, Ankles, Back    Esophageal varices (HCC)     Liver disease     Cirrohis    Neurological disorder       Past Surgical History:   Procedure Laterality Date    HX CHOLECYSTECTOMY      HX GI      upper GI    HX GYN           Prior to Admission medications    Medication Sig Start Date End Date Taking? Authorizing Provider   furosemide (LASIX) 40 mg tablet Take 1 Tab by mouth daily. 4/10/17  Yes Sravanthi Briggs MD   spironolactone (ALDACTONE) 100 mg tablet Take 1 Tab by mouth daily. 4/10/17  Yes Sravanthi Briggs MD     Allergies   Allergen Reactions    Morphine Nausea and Vomiting      History reviewed. No pertinent family history. Family history reviewed and non-contributory.      Social history  Patient resides    Independently    X  With family care      Assisted living      SNF    Ambulates  X  Independently      With cane       Assisted walker         Alcohol history   X  None     Social     Chronic   Smoking history    None   X  Former smoker     Current smoker     History   Smoking Status  Former Smoker    Quit date: 1996   Smokeless Tobacco    Never Used       Code status  X  Full code     DNR/DNI        Code status discussed with the patient/caregivers. No Order    Review of systems  The patient denies any fever, chills, chest pain, cough, congestion, recent illness, palpitations, or dysuria. A comprehensive review of systems was negative except for that written in the History of Present Illness. The remainder of the review of systems was reviewed and is noncontributory. Physical Examination   Visit Vitals    /56    Pulse 80    Temp 97.5 °F (36.4 °C)    Resp 18    Ht 5' 4\" (1.626 m)    Wt 93.4 kg (206 lb)    SpO2 99%    BMI 35.36 kg/m2          O2 Device: Room air    General:  Alert, cooperative, no distress   Head:  Normocephalic, without obvious abnormality, atraumatic   Eyes:  Conjunctivae/corneas clear.  PERRL, EOMs intact   E/N/M/T: Teeth and gums normal  Clear oropharynx   Neck: No carotid bruit   Normal JVP   Lungs:   Symmetrical chest expansion and respiratory effort  Clear to auscultation bilaterally   Heart:  Regular rhythm   Sounds normal; no murmur, click, rub or gallop   Abdomen:   Soft, no tenderness  Bowel sounds normal     Extremities: Extremities normal, atraumatic     Pulses 2+ and symmetric all extremities   Skin: No rashes or ulcers   Musculo-      skeletal: Gait not tested  Normal symmetry, ROM, strength and tone   Neuro: Normal cranial nerves  Normal reflexes and sensation   Psych: Alert, oriented x3  Normal affect, judgement and insight   Geniturinary: deferred     Data Review    EK Hour Results:  Recent Results (from the past 24 hour(s))   AMB POC HEMOGLOBIN (HGB)    Collection Time: 17  2:19 PM   Result Value Ref Range    Hemoglobin (POC) 6.1    CBC WITH AUTOMATED DIFF    Collection Time: 17  3:54 PM   Result Value Ref Range    WBC 2.2 (L) 3.6 - 11.0 K/uL    RBC 2.74 (L) 3.80 - 5.20 M/uL    HGB 6.5 (L) 11.5 - 16.0 g/dL HCT 23.2 (L) 35.0 - 47.0 %    MCV 84.7 80.0 - 99.0 FL    MCH 23.7 (L) 26.0 - 34.0 PG    MCHC 28.0 (L) 30.0 - 36.5 g/dL    RDW 18.4 (H) 11.5 - 14.5 %    PLATELET 63 (L) 537 - 400 K/uL    NEUTROPHILS 66 32 - 75 %    LYMPHOCYTES 24 12 - 49 %    MONOCYTES 7 5 - 13 %    EOSINOPHILS 2 0 - 7 %    BASOPHILS 1 0 - 1 %    ABS. NEUTROPHILS 1.5 (L) 1.8 - 8.0 K/UL    ABS. LYMPHOCYTES 0.5 (L) 0.8 - 3.5 K/UL    ABS. MONOCYTES 0.2 0.0 - 1.0 K/UL    ABS. EOSINOPHILS 0.0 0.0 - 0.4 K/UL    ABS. BASOPHILS 0.0 0.0 - 0.1 K/UL    DF SMEAR SCANNED      PLATELET COMMENTS LARGE PLATELETS      RBC COMMENTS ANISOCYTOSIS  1+        RBC COMMENTS POLYCHROMASIA  1+        RBC COMMENTS BASOPHILIC STIPPLING  PRESENT        RBC COMMENTS MICROCYTOSIS  1+        RBC COMMENTS HYPOCHROMIA  1+       METABOLIC PANEL, COMPREHENSIVE    Collection Time: 08/17/17  3:54 PM   Result Value Ref Range    Sodium 142 136 - 145 mmol/L    Potassium 4.0 3.5 - 5.1 mmol/L    Chloride 111 (H) 97 - 108 mmol/L    CO2 22 21 - 32 mmol/L    Anion gap 9 5 - 15 mmol/L    Glucose 104 (H) 65 - 100 mg/dL    BUN 12 6 - 20 MG/DL    Creatinine 0.51 (L) 0.55 - 1.02 MG/DL    BUN/Creatinine ratio 24 (H) 12 - 20      GFR est AA >60 >60 ml/min/1.73m2    GFR est non-AA >60 >60 ml/min/1.73m2    Calcium 8.3 (L) 8.5 - 10.1 MG/DL    Bilirubin, total 0.9 0.2 - 1.0 MG/DL    ALT (SGPT) 28 12 - 78 U/L    AST (SGOT) 19 15 - 37 U/L    Alk.  phosphatase 105 45 - 117 U/L    Protein, total 7.1 6.4 - 8.2 g/dL    Albumin 3.1 (L) 3.5 - 5.0 g/dL    Globulin 4.0 2.0 - 4.0 g/dL    A-G Ratio 0.8 (L) 1.1 - 2.2     TYPE & SCREEN    Collection Time: 08/17/17  4:52 PM   Result Value Ref Range    Crossmatch Expiration 08/20/2017     ABO/Rh(D) O POSITIVE     Antibody screen POS    OCCULT BLOOD, STOOL    Collection Time: 08/17/17  4:52 PM   Result Value Ref Range    Occult blood, stool NEGATIVE  NEG     IRON PROFILE    Collection Time: 08/17/17  5:29 PM   Result Value Ref Range    Iron 23 (L) 35 - 150 ug/dL    TIBC 519 (H) 250 - 450 ug/dL    Iron % saturation 4 (L) 20 - 50 %   FERRITIN    Collection Time: 08/17/17  5:29 PM   Result Value Ref Range    Ferritin 7 (L) 8 - 252 NG/ML     Recent Labs      08/17/17   1554   WBC  2.2*   HGB  6.5*   HCT  23.2*   PLT  63*     Recent Labs      08/17/17   1554   NA  142   K  4.0   CL  111*   CO2  22   GLU  104*   BUN  12   CREA  0.51*   CA  8.3*   ALB  3.1*   TBILI  0.9   SGOT  19   ALT  28       Imaging        Assessment and Plan   Active Problems:    Symptomatic anemia (8/17/2017)      Severe Anemia likely due to chronic disease  - Transfuse 2U PRBCs  - recheck Hb in AM  - No indication of GI Bleeding at this time  - check fecal occult blood stool     Hypertension uncontrolled  - has not taken lasix or aldactone  - resume meds  - IV Hydralazine X 1 now    Cirrhosis due to Hep C  - stable    Thrombocytopenia due to Cirrhosis  - monitor     Esophageal Varices   - s/p Banding 8/10/17      Diet: Cardiac  Activity: as tolerated  DVT prophylaxis: SCDs  Isolation precautions: none  Consultations: none  Anticipated disposition: Home in AM       Signed by: Rosa Mensah MD    August 17, 2017 at 6:11 PM

## 2017-08-17 NOTE — PROGRESS NOTES
Admission Medication Reconciliation:    Information obtained from: patient    Significant PMH/Disease States:   Past Medical History:   Diagnosis Date    Arthritis 2014    Shoulders, Hips, Knees, Ankles, Back    Esophageal varices ([address]     Liver disease 2017    Cirrohis    Neurological disorder        Chief Complaint for this Admission:    Chief Complaint   Patient presents with    Abnormal Lab Results    Dizziness         Allergies:  Morphine    Prior to Admission Medications:   Prior to Admission Medications   Prescriptions Last Dose Informant Patient Reported? Taking?   furosemide (LASIX) 40 mg tablet 8/16/2017  No Yes   Sig: Take 1 Tab by mouth daily. spironolactone (ALDACTONE) 100 mg tablet 8/16/2017  No Yes   Sig: Take 1 Tab by mouth daily. Facility-Administered Medications: None         Comments/Recommendations: This medication history was obtained from patient; (s)he appears to be a good historian. An RX Query is not available. Medications added: none  Medications deleted: ferrous sulfate 325mg every day, megace 20mg every day, nadolol 40mg every day, tramadol   Medications amended: none    Last doses of medication: patient did not take fluid pills today because she was going to be in [place] [id number] and didn't want to have to use the restroom  Review of Allergies: completed      Thank you for allowing me to participate in the care of this patient. Please contact the pharmacy () or the medication reconciliation pharmacy () with any questions.     Lm Diaz, [place]binD

## 2017-08-17 NOTE — IP AVS SNAPSHOT
2700 [facility] [address] [place] [address] 
[phone] Patient: Nicole Khalil MRN: [id number] EDZ:0/4/0226 You are allergic to the following Allergen Reactions Morphine Nausea and Vomiting Recent Documentation Height Weight BMI OB Status Smoking Status 1.626 m 90.6 kg 34.28 kg/m2 Postmenopausal Former Smoker Unresulted Labs Order Current Status SAMPLE TO BLOOD BANK In process SAMPLE TO BLOOD BANK In process TYPE & SCREEN Preliminary result Emergency Contacts Name Discharge Info Relation Home Work Mobile [address] CAREGIVER [3] Spouse [3] [phone] About your hospitalization You were admitted on:  August 17, 2017 You last received care in the:  [facility] 4 IMCU You were discharged on:  August 20, 2017 Unit phone number:  [phone] Why you were hospitalized Your primary diagnosis was:  Not on File Your diagnoses also included:  Symptomatic Anemia Providers Seen During Your Hospitalizations Provider Role Specialty Primary office phone Johanny Covington MD Attending Provider Emergency Medicine [phone] Keily Martínez MD Attending Provider Internal Medicine [phone] Lilly Howard MD Attending Provider Internal Medicine [phone] Maeve Maurer MD Attending Provider Internal Medicine [phone] Your Primary Care Physician (PCP) Primary Care Physician Office Phone Office Fax Arleth Weber [phone] Follow-up Information Follow up With Details Comments Contact Info Flaquito Aviles MD   [address] 
[phone] Current Discharge Medication List  
  
START taking these medications Dose & Instructions Dispensing Information Comments Morning Noon Evening Bedtime ferrous sulfate 325 mg (65 mg iron) tablet Your last dose was: Your next dose is:    
   
   
 Dose:  325 mg Take 1 Tab by mouth two (2) times a day. Quantity:  60 Tab Refills:  0  
     
   
   
   
  
 metoprolol succinate 25 mg XL tablet Commonly known as:  TOPROL-XL Your last dose was: Your next dose is:    
   
   
 Dose:  25 mg Take 1 Tab by mouth daily. Indications: hypertension Quantity:  30 Tab Refills:  0  
     
   
   
   
  
 * propranolol 10 mg tablet Commonly known as:  INDERAL Your last dose was: Your next dose is:    
   
   
 Dose:  10 mg Take 1 Tab by mouth three (3) times daily. Quantity:  90 Tab Refills:  0  
     
   
   
   
  
 * propranolol 20 mg tablet Commonly known as:  INDERAL Your last dose was: Your next dose is:    
   
   
 Dose:  20 mg Take 1 Tab by mouth two (2) times a day. Quantity:  60 Tab Refills:  0  
     
   
   
   
  
 * propranolol 20 mg tablet Commonly known as:  INDERAL Your last dose was: Your next dose is:    
   
   
 Dose:  20 mg Take 1 Tab by mouth two (2) times a day. Quantity:  60 Tab Refills:  0  
     
   
   
   
  
 * propranolol 20 mg tablet Commonly known as:  INDERAL Your last dose was: Your next dose is:    
   
   
 Dose:  20 mg Take 1 Tab by mouth two (2) times a day. Quantity:  60 Tab Refills:  0  
     
   
   
   
  
 sucralfate 100 mg/mL suspension Commonly known as:  Ilda Sers Your last dose was: Your next dose is:    
   
   
 Dose:  1 g Take 10 mL by mouth every six (6) hours. Quantity:  100 mL Refills:  0  
     
   
   
   
  
 * Notice: This list has 4 medication(s) that are the same as other medications prescribed for you. Read the directions carefully, and ask your doctor or other care provider to review them with you. CONTINUE these medications which have NOT CHANGED Dose & Instructions Dispensing Information Comments Morning Noon Evening Bedtime  
 furosemide 40 mg tablet Commonly known as:  LASIX Your last dose was: Your next dose is:    
   
   
 Dose:  40 mg Take 1 Tab by mouth daily. Quantity:  90 Tab Refills:  3  
     
   
   
   
  
 spironolactone 100 mg tablet Commonly known as:  ALDACTONE Your last dose was: Your next dose is:    
   
   
 Dose:  100 mg Take 1 Tab by mouth daily. Quantity:  90 Tab Refills:  3 Where to [facility] Your Medications [facility]  3285 [facility], [address] [place] [address] Phone:  [phone]  
  metoprolol succinate 25 mg XL tablet  
 propranolol 20 mg tablet  
 propranolol 20 mg tablet  
 propranolol 20 mg tablet Information on where to get these meds will be given to you by the nurse or doctor. ! Ask your nurse or doctor about these medications  
  ferrous sulfate 325 mg (65 mg iron) tablet  
 propranolol 10 mg tablet  
 sucralfate 100 mg/mL suspension Discharge Instructions Patient Discharge Instructions Radha Solano / [id number] : 1955 Admitted 2017 Discharged: 2017 2:39 PM  
 
ACUTE DIAGNOSES: 
Symptomatic anemia EGD Symptomatic anemia CHRONIC MEDICAL DIAGNOSES: 
Problem List as of 2017  Date Reviewed: 2017 Codes Class Noted - Resolved Secondary esophageal varices without bleeding (HCC) ICD-10-CM: I85.10 ICD-9-CM: 456.21  2017 - Present Symptomatic anemia ICD-10-CM: D64.9 ICD-9-CM: 285.9  2017 - Present Cirrhosis ([facility] 75.) ICD-10-CM: K74.60 ICD-9-CM: 571.5  4/10/2017 - Present Ascites ICD-10-CM: R18.8 ICD-9-CM: 789.59  4/10/2017 - Present Chronic hepatitis C ([facility] 75.) ICD-10-CM: B18.2 ICD-9-CM: 070.54  2017 - Present Obesity (BMI 30-39. 9) ICD-10-CM: E66.9 ICD-9-CM: 278.00  10/26/2016 - Present Post-traumatic osteoarthritis of left hip ICD-10-CM: J44.13 
ICD-9-CM: 715.25  10/26/2016 - Present Primary osteoarthritis of right knee ICD-10-CM: M17.11 ICD-9-CM: 715.16  10/26/2016 - Present DISCHARGE MEDICATIONS:  
 
 
 
 
· It is important that you take the medication exactly as they are prescribed. · Keep your medication in the bottles provided by the pharmacist and keep a list of the medication names, dosages, and times to be taken in your wallet. · Do not take other medications without consulting your doctor. DIET:  Regular Diet ACTIVITY: Activity as tolerated ADDITIONAL INFORMATION: If you experience any of the following symptoms then please call your primary care physician or return to the emergency room if you cannot get hold of your doctor: Fever, chills, nausea, vomiting, diarrhea, change in mentation, falling, bleeding, shortness of breath. FOLLOW UP CARE: 
Dr. Olvin Appiah MD.  Please call and set up an appointment to see them in 2 weeks. Call Dr Patito Dill  at the number below and set up an appointment to see them in 1 week: Information obtained by : 
I understand that if any problems occur once I am at home I am to contact my physician. I understand and acknowledge receipt of the instructions indicated above. Physician's or R.N.'s Signature                                                                  Date/Time Patient or Representative Signature                                                          Date/Time Discharge Orders None Introducing [facility] & HEALTH SERVICES! [facility] introduce portal paciente MyChart . Ahora se puede acceder a partes de tam expediente médico, enviar por correo electrónico la oficina de tam médico y solicitar renovaciones de medicamentos en línea. En tam navegador de Internet , Niesha Point a [email or web address] Harjinder clic en el usuario por Olimpia Junior? Amada Peeling clic aquí en la sesión Jai Eye. Verá la página de registro [place]. Ingrese tam código de [employer] magalys y ofelia aparece a continuación. Usted no tendrá que [employer] código después de sukhdeep completado el proceso de registro . Si usted no se inscribe antes de la fecha de caducidad , debe solicitar un nuevo código. · MyChart Código de acceso : [id number] Expires: 11/8/2017  2:38 PM 
 
Ingresa los últimos cuatro dígitos de tam Número de Seguro Social ( xxxx ) y fecha de nacimiento ( dd / mm / aaaa ) ofelia se indica y harjinder clic en Enviar. Usted será llevado a la siguiente página de registro . Crear un ID MyChart . Esta será tam ID de inicio de sesión de MyChart y no puede ser [place] , por lo que pensar en lam que es Debroah Natacha y fácil de recordar . Crear lam contraseña MyChart . Usted puede cambiar tam contraseña en cualquier momento . Ingrese tam Password Reset de preguntas y [place] . Oden se puede utilizar en un momento posterior si usted olvida tam contraseña. Introduzca tam dirección de correo electrónico . [place] recibirá lam notificación por correo electrónico cuando la nueva información está disponible en MyChart . Jose A coombsic en Registrarse. Olam Bustle víctor y descargar porciones de tam expediente médico. 
Harjinder clic en el enlace de descarga del menú Resumen para descargar lam copia portátil de tam información médica . Si tiene [employer] , por favor visite la sección de preguntas frecuentes del sitio web MyChart . Recuerde, MyChart NO es que se utilizará para las necesidades urgentes. Para emergencias médicas , llame al 911 . Ahora disponible en hinojosa iPhone y Android ! General Information Please provide this summary of care documentation to your next provider. Patient Signature:  ____________________________________________________________ Date:  ____________________________________________________________  
  
[facility] Provider Signature:  ____________________________________________________________ Date:  ____________________________________________________________  
  
  
   
  
[address] 
[phone] Patient: Camron Riggs MRN: [id number] GOJ:[birth year] Tiene alergias a lo siguiente [place]ain [place] Morphine Nausea and Vomiting Documentación recientes Height Weight BMI (IMC) Estado obstétrico Estatus de tabaquísmo 1.626 m 90.6 kg 34.28 kg/m2 Postmenopausal Former Smoker Unresulted Labs Order Current Status SAMPLE TO BLOOD BANK In process SAMPLE TO BLOOD BANK In process TYPE & SCREEN Preliminary result Emergency Contacts Name Discharge Info Relation Home Work Mobile [address] CAREGIVER [3] Spouse [3] [phone] Sobre hinojosa hospitalización Le admitieron el:  August 17, 2017 Hinojosa tratamiento más reciente [facility]  [facility] [employer] 4 IMCU Le dieron de lucas el:  August 20, 2017 Número de teléfono de la unidad:  [phone] Por qué le ingresaron Hinojosa diagnosis primaria es:  No está archivado/a Hinojosa diagnosis también incluye:  Symptomatic Anemia Proveedores de verse wesly justus hospitalizaciones Personal Médico Rol Especialidad Teléfono principal de la reji Tran MD Attending Provider Emergency Medicine [phone] Joyce Gould MD Attending Provider Internal Medicine [phone]  Denisse Foley MD Attending Provider Internal Medicine [phone] Will Thomas MD Attending Provider Internal Medicine [phone] Hinojosa médico de atención primaria (PCP ) Primary Care Physician Office Phone Office Fax Chidi Hancock [phone] Follow-up Information Follow up With Details Comments Contact Info Alicia Becker MD   [address] 
[phone] Aprobación de la gestión actual lista de medicamentos EMPIECE a monika [employer] Dosis e instrucciones Información de dispensación Comentarios Morning Noon Evening Bedtime  
 ferrous sulfate 325 mg (65 mg iron) tablet Your last dose was: Your next dose is:    
   
   
 Dosis:  325 mg Take 1 Tab by mouth two (2) times a day. cantidad:  60 Tab  
recargas:  0  
     
   
   
   
  
 metoprolol succinate 25 mg XL tablet También conocido ofelia:  SLVGWY-FB Your last dose was: Your next dose is:    
   
   
 Dosis:  25 mg Take 1 Tab by mouth daily. Indications: hypertension  
 cantidad:  30 Tab  
recargas:  0  
     
   
   
   
  
 * propranolol 10 mg tablet También conocido ofelai:  INDERAL Your last dose was: Your next dose is:    
   
   
 Dosis:  10 mg Take 1 Tab by mouth three (3) times daily. cantidad:  90 Tab  
recargas:  0  
     
   
   
   
  
 * propranolol 20 mg tablet También conocido ofelia:  INDERAL Your last dose was: Your next dose is:    
   
   
 Dosis:  20 mg Take 1 Tab by mouth two (2) times a day. cantidad:  60 Tab  
recargas:  0  
     
   
   
   
  
 * propranolol 20 mg tablet También conocido ofelia:  INDERAL Your last dose was: Your next dose is:    
   
   
 Dosis:  20 mg Take 1 Tab by mouth two (2) times a day. cantidad:  60 Tab  
recargas:  0  
     
   
   
   
  
 * propranolol 20 mg tablet También conocido ofelia:  INDERAL Your last dose was:     
   
Your next dose is:    
   
   
 Dosis:  20 mg  
 Take 1 Tab by mouth two (2) times a day. cantidad:  60 Tab  
recargas:  0  
     
   
   
   
  
 sucralfate 100 mg/mL suspension También conocido ofelia:  Samul Se Your last dose was: Your next dose is:    
   
   
 Dosis:  1 g Take 10 mL by mouth every six (6) hours. cantidad:  100 mL  
recargas:  0  
     
   
   
   
  
 * Aviso:  Esta lista contiene medicamentos que son iguales a otros medicamentos recetados para usted. [place] las instrucciones con cuidado y pida a tam personal médico que revise la lista de medicamentos y las instrucciones correspondientes con usted. CONTINUAR estos medicamentos que no [place] Dosis e instrucciones Información de dispensación Comentarios Morning Noon Evening Bedtime  
 furosemide 40 mg tablet También conocido ofelia:  LASIX Your last dose was: Your next dose is:    
   
   
 Dosis:  40 mg Take 1 Tab by mouth daily. cantidad:  90 Tab  
recargas:  3  
     
   
   
   
  
 spironolactone 100 mg tablet También conocido ofelia:  ALDACTONE Your last dose was: Your next dose is:    
   
   
 Dosis:  100 mg Take 1 Tab by mouth daily. cantidad:  90 Tab  
recargas:  3 Dónde puede recoger justus medicamentos Estos medicamentos fueron enviados a [facility] [address] [place] [address] Teléfono:  [phone]  
  metoprolol succinate 25 mg XL tablet  
 propranolol 20 mg tablet  
 propranolol 20 mg tablet  
 propranolol 20 mg tablet Información sobre dónde obtener estos medicamentos se le dará a usted por la enfermera o el médico.   
 ! Pregunte a tam médico o enfermero/a sobre estos medicamentos  
  ferrous sulfate 325 mg (65 mg iron) tablet  
 propranolol 10 mg tablet  
 sucralfate 100 mg/mL suspension Discharge Instructions Patient Discharge Instructions Telma Olivier / [id number] : 1955 Admitted 2017 Discharged: 2017 2:39 PM  
 
ACUTE DIAGNOSES: 
Symptomatic anemia EGD Symptomatic anemia CHRONIC MEDICAL DIAGNOSES: 
Problem List as of 2017  Date Reviewed: 2017 Codes Class Noted - Resolved Secondary esophageal varices without bleeding (HCC) ICD-10-CM: I85.10 ICD-9-CM: 456.21  2017 - Present Symptomatic anemia ICD-10-CM: D64.9 ICD-9-CM: 285.9  2017 - Present Cirrhosis ([facility] 75.) ICD-10-CM: K74.60 ICD-9-CM: 571.5  4/10/2017 - Present Ascites ICD-10-CM: R18.8 ICD-9-CM: 789.59  4/10/2017 - Present Chronic hepatitis C ([facility] 75.) ICD-10-CM: B18.2 ICD-9-CM: 070.54  2017 - Present Obesity (BMI 30-39. 9) ICD-10-CM: E66.9 ICD-9-CM: 278.00  10/26/2016 - Present Post-traumatic osteoarthritis of left hip ICD-10-CM: F47.76 
ICD-9-CM: 715.25  10/26/2016 - Present Primary osteoarthritis of right knee ICD-10-CM: M17.11 ICD-9-CM: 715.16  10/26/2016 - Present DISCHARGE MEDICATIONS:  
 
 
 
 
· It is important that you take the medication exactly as they are prescribed. · Keep your medication in the bottles provided by the pharmacist and keep a list of the medication names, dosages, and times to be taken in your wallet. · Do not take other medications without consulting your doctor. DIET:  Regular Diet ACTIVITY: Activity as tolerated ADDITIONAL INFORMATION: If you experience any of the following symptoms then please call your primary care physician or return to the emergency room if you cannot get hold of your doctor: Fever, chills, nausea, vomiting, diarrhea, change in mentation, falling, bleeding, shortness of breath. FOLLOW UP CARE: 
Dr. Olvin Appiah MD.  Please call and set up an appointment to see them in 2 weeks. Call Dr Patito Dill  at the number below and set up an appointment to see them in 1 week:  
 
 
 
Information obtained by : 
 I understand that if any problems occur once I am at home I am to contact my physician. I understand and acknowledge receipt of the instructions indicated above. Physician's or R.N.'s Signature                                                                  Date/Time Patient or Representative Signature                                                          Date/Time Discharge Orders Curio Introducing \[facility]\"" SERVICES! Praveen Huntley introduce portal paciente MyChart . Ahora se puede acceder a partes de tam expediente médico, enviar por correo electrónico la oficina de tam médico y solicitar renovaciones de medicamentos en línea. En tam navegador de Internet , Eric Hansen a [email or web address] Harjinder clic en el usuario por Al Haskins? Leroy Cain clic aquí en la sesión Tanya Ron. Verá la página de registro [place]. Ingrese tam código de [employer] magalys y ofelia aparece a continuación. Usted no tendrá que [employer] código después de sukhdeep completado el proceso de registro . Si usted no se inscribe antes de la fecha de caducidad , debe solicitar un nuevo código. · MyChart Código de acceso : [id number] Expires: 11/8/2017  2:38 PM 
 
Ingresa los últimos cuatro dígitos de tam Número de Seguro Social ( xxxx ) y fecha de nacimiento ( dd / mm / aaaa ) ofelia se indica y harjinder clic en Enviar. Usted será llevado a la siguiente página de registro . Crear un ID MyChart . Esta será tam ID de inicio de sesión de MyChart y no puede ser [place] , por lo que pensar en lam que es Alejandra Mood y fácil de recordar . Crear lam contraseña MyChart . Usted puede cambiar tam contraseña en cualquier momento . Ingrese tam Password Reset de preguntas y [place] . [place] se puede utilizar en un momento posterior si usted olvida tam contraseña. Introduzca tam dirección de correo electrónico . Arpit Rod recibirá lam notificación por correo electrónico cuando la nueva información está disponible en MyChart . Kayla Knock clic en Registrarse. Seda Bahai víctor y descargar porciones de tam expediente médico. 
Sheeba davey en el enlace de descarga del menú Resumen para descargar lam copia portátil de tam información médica . Si tiene [employer] , por favor visite la sección de preguntas frecuentes del sitio web MyChart . Recuerde, MyChart NO es que se utilizará para las necesidades urgentes. Para emergencias médicas , llame al 911 . Ahora disponible en tam iPhone y Android ! General Information Please provide this summary of care documentation to your next provider. Patient Signature:  ____________________________________________________________ Date:  ____________________________________________________________  
  
[facility] Provider Signature:  ____________________________________________________________ Date:  ____________________________________________________________

## 2017-08-17 NOTE — ROUTINE PROCESS
TRANSFER - OUT REPORT:    Verbal report given to [facility] RN(name) on Loida Roblero  being transferred to [facility](unit) for routine progression of care       Report consisted of patients Situation, Background, Assessment and   Recommendations(SBAR). Information from the following report(s) SBAR, ED Summary, MAR, Recent Results and Cardiac Rhythm nsr was reviewed with the receiving nurse. Lines:   Peripheral IV 08/17/17 Right Antecubital (Active)   Site Assessment Clean, dry, & intact 8/17/2017  3:56 PM   Phlebitis Assessment 0 8/17/2017  3:56 PM   Infiltration Assessment 0 8/17/2017  3:56 PM   Dressing Status Clean, dry, & intact 8/17/2017  3:56 PM   Dressing Type Transparent 8/17/2017  3:56 PM   Hub Color/Line Status Pink;Capped;Flushed;Patent 8/17/2017  3:56 PM   Action Taken Blood drawn 8/17/2017  3:56 PM        Opportunity for questions and clarification was provided.       Patient transported with:   transportation

## 2017-08-18 ENCOUNTER — APPOINTMENT (OUTPATIENT)
Dept: CT IMAGING | Age: 62
DRG: 378 | End: 2017-08-18
Attending: FAMILY MEDICINE
Payer: SUBSIDIZED

## 2017-08-18 ENCOUNTER — TELEPHONE (OUTPATIENT)
Dept: HEMATOLOGY | Age: 62
End: 2017-08-18

## 2017-08-18 LAB
ANION GAP SERPL CALC-SCNC: 8 MMOL/L (ref 5–15)
ATRIAL RATE: 80 BPM
BASOPHILS # BLD: 0 K/UL (ref 0–0.1)
BASOPHILS NFR BLD: 0 % (ref 0–1)
BUN SERPL-MCNC: 9 MG/DL (ref 6–20)
BUN/CREAT SERPL: 16 (ref 12–20)
CALCIUM SERPL-MCNC: 8.4 MG/DL (ref 8.5–10.1)
CALCULATED P AXIS, ECG09: 72 DEGREES
CALCULATED R AXIS, ECG10: 57 DEGREES
CALCULATED T AXIS, ECG11: 46 DEGREES
CHLORIDE SERPL-SCNC: 110 MMOL/L (ref 97–108)
CO2 SERPL-SCNC: 24 MMOL/L (ref 21–32)
CREAT SERPL-MCNC: 0.56 MG/DL (ref 0.55–1.02)
DIAGNOSIS, 93000: NORMAL
EOSINOPHIL # BLD: 0 K/UL (ref 0–0.4)
EOSINOPHIL NFR BLD: 2 % (ref 0–7)
ERYTHROCYTE [DISTWIDTH] IN BLOOD BY AUTOMATED COUNT: 18.4 % (ref 11.5–14.5)
GLUCOSE SERPL-MCNC: 105 MG/DL (ref 65–100)
HCT VFR BLD AUTO: 29.1 % (ref 35–47)
HGB BLD-MCNC: 8.6 G/DL (ref 11.5–16)
LYMPHOCYTES # BLD: 0.5 K/UL (ref 0.8–3.5)
LYMPHOCYTES NFR BLD: 19 % (ref 12–49)
MAGNESIUM SERPL-MCNC: 2.1 MG/DL (ref 1.6–2.4)
MCH RBC QN AUTO: 24.9 PG (ref 26–34)
MCHC RBC AUTO-ENTMCNC: 29.6 G/DL (ref 30–36.5)
MCV RBC AUTO: 84.3 FL (ref 80–99)
MONOCYTES # BLD: 0.2 K/UL (ref 0–1)
MONOCYTES NFR BLD: 8 % (ref 5–13)
NEUTS SEG # BLD: 1.9 K/UL (ref 1.8–8)
NEUTS SEG NFR BLD: 71 % (ref 32–75)
P-R INTERVAL, ECG05: 162 MS
PHOSPHATE SERPL-MCNC: 3.8 MG/DL (ref 2.6–4.7)
PLATELET # BLD AUTO: 72 K/UL (ref 150–400)
POTASSIUM SERPL-SCNC: 3.6 MMOL/L (ref 3.5–5.1)
Q-T INTERVAL, ECG07: 424 MS
QRS DURATION, ECG06: 98 MS
QTC CALCULATION (BEZET), ECG08: 489 MS
RBC # BLD AUTO: 3.45 M/UL (ref 3.8–5.2)
SODIUM SERPL-SCNC: 142 MMOL/L (ref 136–145)
VENTRICULAR RATE, ECG03: 80 BPM
WBC # BLD AUTO: 2.7 K/UL (ref 3.6–11)

## 2017-08-18 PROCEDURE — 88305 TISSUE EXAM BY PATHOLOGIST: CPT | Performed by: INTERNAL MEDICINE

## 2017-08-18 PROCEDURE — 74011250637 HC RX REV CODE- 250/637: Performed by: HOSPITALIST

## 2017-08-18 PROCEDURE — 0DB58ZX EXCISION OF ESOPHAGUS, VIA NATURAL OR ARTIFICIAL OPENING ENDOSCOPIC, DIAGNOSTIC: ICD-10-PCS | Performed by: INTERNAL MEDICINE

## 2017-08-18 PROCEDURE — 99218 HC RM OBSERVATION: CPT

## 2017-08-18 PROCEDURE — 84100 ASSAY OF PHOSPHORUS: CPT | Performed by: HOSPITALIST

## 2017-08-18 PROCEDURE — P9016 RBC LEUKOCYTES REDUCED: HCPCS | Performed by: STUDENT IN AN ORGANIZED HEALTH CARE EDUCATION/TRAINING PROGRAM

## 2017-08-18 PROCEDURE — 80048 BASIC METABOLIC PNL TOTAL CA: CPT | Performed by: HOSPITALIST

## 2017-08-18 PROCEDURE — 76040000019: Performed by: INTERNAL MEDICINE

## 2017-08-18 PROCEDURE — 70450 CT HEAD/BRAIN W/O DYE: CPT

## 2017-08-18 PROCEDURE — 30233N1 TRANSFUSION OF NONAUTOLOGOUS RED BLOOD CELLS INTO PERIPHERAL VEIN, PERCUTANEOUS APPROACH: ICD-10-PCS | Performed by: HOSPITALIST

## 2017-08-18 PROCEDURE — 74011250636 HC RX REV CODE- 250/636: Performed by: FAMILY MEDICINE

## 2017-08-18 PROCEDURE — 74011250636 HC RX REV CODE- 250/636: Performed by: INTERNAL MEDICINE

## 2017-08-18 PROCEDURE — 77030009426 HC FCPS BIOP ENDOSC BSC -B: Performed by: INTERNAL MEDICINE

## 2017-08-18 PROCEDURE — 83735 ASSAY OF MAGNESIUM: CPT | Performed by: HOSPITALIST

## 2017-08-18 PROCEDURE — 65660000001 HC RM ICU INTERMED STEPDOWN

## 2017-08-18 PROCEDURE — 86922 COMPATIBILITY TEST ANTIGLOB: CPT | Performed by: STUDENT IN AN ORGANIZED HEALTH CARE EDUCATION/TRAINING PROGRAM

## 2017-08-18 PROCEDURE — 74011000250 HC RX REV CODE- 250: Performed by: FAMILY MEDICINE

## 2017-08-18 PROCEDURE — 74011250636 HC RX REV CODE- 250/636

## 2017-08-18 PROCEDURE — 93005 ELECTROCARDIOGRAM TRACING: CPT

## 2017-08-18 PROCEDURE — 86921 COMPATIBILITY TEST INCUBATE: CPT | Performed by: STUDENT IN AN ORGANIZED HEALTH CARE EDUCATION/TRAINING PROGRAM

## 2017-08-18 PROCEDURE — 36430 TRANSFUSION BLD/BLD COMPNT: CPT

## 2017-08-18 PROCEDURE — 36415 COLL VENOUS BLD VENIPUNCTURE: CPT | Performed by: HOSPITALIST

## 2017-08-18 PROCEDURE — 86902 BLOOD TYPE ANTIGEN DONOR EA: CPT | Performed by: STUDENT IN AN ORGANIZED HEALTH CARE EDUCATION/TRAINING PROGRAM

## 2017-08-18 PROCEDURE — 85025 COMPLETE CBC W/AUTO DIFF WBC: CPT | Performed by: HOSPITALIST

## 2017-08-18 RX ORDER — NALOXONE HYDROCHLORIDE 0.4 MG/ML
0.4 INJECTION, SOLUTION INTRAMUSCULAR; INTRAVENOUS; SUBCUTANEOUS
Status: DISCONTINUED | OUTPATIENT
Start: 2017-08-18 | End: 2017-08-18 | Stop reason: HOSPADM

## 2017-08-18 RX ORDER — SODIUM CHLORIDE 0.9 % (FLUSH) 0.9 %
5-10 SYRINGE (ML) INJECTION EVERY 8 HOURS
Status: ACTIVE | OUTPATIENT
Start: 2017-08-18 | End: 2017-08-18

## 2017-08-18 RX ORDER — FLUMAZENIL 0.1 MG/ML
0.2 INJECTION INTRAVENOUS
Status: DISCONTINUED | OUTPATIENT
Start: 2017-08-18 | End: 2017-08-18 | Stop reason: HOSPADM

## 2017-08-18 RX ORDER — HYDRALAZINE HYDROCHLORIDE 20 MG/ML
10 INJECTION INTRAMUSCULAR; INTRAVENOUS
Status: DISCONTINUED | OUTPATIENT
Start: 2017-08-18 | End: 2017-08-20 | Stop reason: HOSPADM

## 2017-08-18 RX ORDER — HYDRALAZINE HYDROCHLORIDE 20 MG/ML
10 INJECTION INTRAMUSCULAR; INTRAVENOUS ONCE
Status: COMPLETED | OUTPATIENT
Start: 2017-08-18 | End: 2017-08-18

## 2017-08-18 RX ORDER — SODIUM CHLORIDE 9 MG/ML
250 INJECTION, SOLUTION INTRAVENOUS AS NEEDED
Status: DISCONTINUED | OUTPATIENT
Start: 2017-08-18 | End: 2017-08-20 | Stop reason: HOSPADM

## 2017-08-18 RX ORDER — FENTANYL CITRATE 50 UG/ML
50-200 INJECTION, SOLUTION INTRAMUSCULAR; INTRAVENOUS
Status: DISCONTINUED | OUTPATIENT
Start: 2017-08-18 | End: 2017-08-18 | Stop reason: HOSPADM

## 2017-08-18 RX ORDER — EPINEPHRINE 0.1 MG/ML
1 INJECTION INTRACARDIAC; INTRAVENOUS
Status: DISCONTINUED | OUTPATIENT
Start: 2017-08-18 | End: 2017-08-18 | Stop reason: HOSPADM

## 2017-08-18 RX ORDER — DEXTROMETHORPHAN/PSEUDOEPHED 2.5-7.5/.8
1.2 DROPS ORAL
Status: DISCONTINUED | OUTPATIENT
Start: 2017-08-18 | End: 2017-08-18 | Stop reason: HOSPADM

## 2017-08-18 RX ORDER — SODIUM CHLORIDE 9 MG/ML
50 INJECTION, SOLUTION INTRAVENOUS CONTINUOUS
Status: DISPENSED | OUTPATIENT
Start: 2017-08-18 | End: 2017-08-18

## 2017-08-18 RX ORDER — SODIUM CHLORIDE 0.9 % (FLUSH) 0.9 %
5 SYRINGE (ML) INJECTION EVERY 8 HOURS
Status: DISCONTINUED | OUTPATIENT
Start: 2017-08-18 | End: 2017-08-20 | Stop reason: HOSPADM

## 2017-08-18 RX ORDER — SODIUM CHLORIDE 0.9 % (FLUSH) 0.9 %
5 SYRINGE (ML) INJECTION AS NEEDED
Status: DISCONTINUED | OUTPATIENT
Start: 2017-08-18 | End: 2017-08-20 | Stop reason: HOSPADM

## 2017-08-18 RX ORDER — SODIUM CHLORIDE 0.9 % (FLUSH) 0.9 %
5-10 SYRINGE (ML) INJECTION AS NEEDED
Status: ACTIVE | OUTPATIENT
Start: 2017-08-18 | End: 2017-08-18

## 2017-08-18 RX ORDER — ATROPINE SULFATE 0.1 MG/ML
0.5 INJECTION INTRAVENOUS
Status: DISCONTINUED | OUTPATIENT
Start: 2017-08-18 | End: 2017-08-18 | Stop reason: HOSPADM

## 2017-08-18 RX ORDER — MIDAZOLAM HYDROCHLORIDE 1 MG/ML
5-10 INJECTION, SOLUTION INTRAMUSCULAR; INTRAVENOUS
Status: DISCONTINUED | OUTPATIENT
Start: 2017-08-18 | End: 2017-08-18 | Stop reason: HOSPADM

## 2017-08-18 RX ORDER — FENTANYL CITRATE 50 UG/ML
INJECTION, SOLUTION INTRAMUSCULAR; INTRAVENOUS
Status: DISPENSED
Start: 2017-08-18 | End: 2017-08-19

## 2017-08-18 RX ADMIN — Medication 5 ML: at 22:16

## 2017-08-18 RX ADMIN — SPIRONOLACTONE 100 MG: 100 TABLET, FILM COATED ORAL at 08:48

## 2017-08-18 RX ADMIN — HYDRALAZINE HYDROCHLORIDE 10 MG: 20 INJECTION INTRAMUSCULAR; INTRAVENOUS at 04:04

## 2017-08-18 RX ADMIN — DOCUSATE SODIUM 100 MG: 100 CAPSULE, LIQUID FILLED ORAL at 08:48

## 2017-08-18 RX ADMIN — SODIUM CHLORIDE 5 MG/HR: 900 INJECTION, SOLUTION INTRAVENOUS at 07:56

## 2017-08-18 RX ADMIN — SODIUM CHLORIDE 5 MG/HR: 900 INJECTION, SOLUTION INTRAVENOUS at 12:31

## 2017-08-18 RX ADMIN — ACETAMINOPHEN 650 MG: 325 TABLET, FILM COATED ORAL at 05:53

## 2017-08-18 RX ADMIN — FUROSEMIDE 40 MG: 40 TABLET ORAL at 08:48

## 2017-08-18 NOTE — PROGRESS NOTES
Nurse called regarding patient with Hgb=6.5, needs blood transfusion, and written consent from patient. I reviewed the chart confirmed Hgb=6.5, went to talk to patient, informed patient of the risks, benefits, and potential complications of blood product transfusion. Patient verbalized understanding of the same and she freely and without coercion provided written informed witnessed consent.

## 2017-08-18 NOTE — PROGRESS NOTES
[address] [facility]       Rand Stewart MD, Quan Sarabia, [place]ite Vandana Grewal, [place] April Ulysses Hopes, NP Carolynne Sahara, PA-C Jaynie Copper, NP Rexanne Akin, NP         at [facility]     [address] [facility], [address] [address]     [phone]     FAX: [phone]    at [facility]. [address], [address] [address], [address]     [phone]     FAX: [phone]         HEPATOLOGY CONSULT NOTE  The patient is well known to me and regularly cared [facility]. 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 She was found to have chronic HCV and cirrhosis in 2/2017 when she was hospitalized at [facility] for ascites. Risk factors for HCV are blood transfusions as a child in [facility]. Ascites has resolved with Diuretics and paracentesis. She has not developed edema or hepatic encephalopathy. EGD in 8/2017 demonstrated multiple large esophageal varices which were banded.     She was seen in the [facility] today because of weakness. She was found to have a HB of 6 and was sent to the ED.      She was given some blood yesterday after admission. She developed hypertension and headache and was sent to ICU this AM.  She was transferred back to floor later in the day. EGD today demonstrated necrotic tissiue in the esophagus which is remnant of prior banding and other changes from banding. No Varices were present. There was not active bleeding. ASSESSMENT AND PLAN:  Cirrhosis  This is secondary to chronic HCV. The CTP score is 7, Child class A, MELD score 12.       Chronic HCV  This needs to be treated. Would use Harvoni since she had prior decompensation with ascites. Will get treatment started as soon as we see her back in the office after this hospitalization.     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 She was given blood.   She needs IV FE to replace FE stores. This will never happen with oral FE.      Thrombocytopenia  This is secondary to cirrhosis. No treatment needed.     PHYSICAL EXAMINATION:  VS: per nursing note  General:  No acute distress. Eyes:  Sclera anicteric. ENT:  No oral lesions. Thyroid normal.  Nodes:  No adenopathy. Skin:  Spider angiomata. No jaundice. Respiratory:  Lungs clear to auscultation. Cardiovascular:  Regular heart rate. Abdomen:  Soft non-tender, No obvious ascites. Extremities:  No lower extremity edema. Neurologic:  Alert and oriented. Cranial nerves grossly intact. No asterixis.     LABORATORY:  Results for Darian Patiño (MRN [id number]) as of 8/18/2017 18:49   Ref. Range 8/17/2017 15:54 8/18/2017 13:21   WBC Latest Ref Range: 3.6 - 11.0 K/uL 2.2 (L) 2.7 (L)   HGB Latest Ref Range: 11.5 - 16.0 g/dL 6.5 (L) 8.6 (L)   PLATELET Latest Ref Range: 150 - 400 K/uL 63 (L) 72 (L)   Sodium Latest Ref Range: 136 - 145 mmol/L 142 142   Potassium Latest Ref Range: 3.5 - 5.1 mmol/L 4.0 3.6   Chloride Latest Ref Range: 97 - 108 mmol/L 111 (H) 110 (H)   CO2 Latest Ref Range: 21 - 32 mmol/L 22 24   Glucose Latest Ref Range: 65 - 100 mg/dL 104 (H) 105 (H)   BUN Latest Ref Range: 6 - 20 MG/DL 12 9   Creatinine Latest Ref Range: 0.55 - 1.02 MG/DL 0.51 (L) 0.56   Bilirubin, total Latest Ref Range: 0.2 - 1.0 MG/DL 0.9    Albumin Latest Ref Range: 3.5 - 5.0 g/dL 3.1 (L)    ALT (SGPT) Latest Ref Range: 12 - 78 U/L 28    AST Latest Ref Range: 15 - 37 U/L 19    Alk.  phosphatase Latest Ref Range: 45 - 117 U/L 105    Iron % saturation Latest Ref Range: 20 - 50 % 5 (L)    Ferritin Latest Ref Range: 8 - 252 NG/ML 7 (L)      Ishan Mccray MD  Liver [place] [address] [address] [address]  [address]  [address].  [phone]

## 2017-08-18 NOTE — PROGRESS NOTES
Rounded on patient. Patient speaks English and expressed understanding. Patient appeared very sad and teary. The patient was brought down to endoscopy. Patient stated that she would like a visit from a . Will make a referral  to pastoral care.

## 2017-08-18 NOTE — PROGRESS NOTES
Problem: Falls - Risk of  Goal: *Absence of Falls  Document Vivienne Fall Risk and appropriate interventions in the flowsheet. Outcome: Progressing Towards Goal  Fall Risk Interventions:  Mobility Interventions: Communicate number of staff needed for ambulation/transfer                 Elimination Interventions:  Toileting schedule/hourly rounds, Toilet paper/wipes in reach

## 2017-08-18 NOTE — PHYSICIAN ADVISORY
Upon review, Inpatient admission is appropriate. Patient with cirrhosis from hepatitis C, admitted with anemia ( Hgb 6.5). Soon after admission a Rapid Response was called secondary to Dizziness, lightheadedness and markedly elevated systolic BP. Patient transferred to telemetry. She has received blood transfusion and IV treatment for BP. Additional evaluation underway to evaluate anemia. The severity of illness and risk of adverse outcome now require Inpatient admission. Lanny Dao, [address] [facility].  Care Management

## 2017-08-18 NOTE — ROUTINE PROCESS
0730 Bedside and Verbal shift change report given to Dejuan Cortes RN (oncoming nurse) by Toyin Tabares RN (offgoing nurse). Report included the following information SBAR, Kardex, Intake/Output, MAR, Recent Results, Med Rec Status and Cardiac Rhythm NSR.

## 2017-08-18 NOTE — PROCEDURES
[address] Rashmi Dick MD, [address], Cite [facility], [place]       JAMIL Cha PA-C Dasie Billow, NP Lyda Daring, NP        at [address], [address] [address]     [phone]     FAX: [phone]    at [address], [address] [facility], [address]     [phone]     FAX: [phone]       UPPER ENDOSCOPY PROCEDURE NOTE    Yen Benavidez  1955    INDICATION: Cirrhosis. Screening for esophageal varices with variceal ligation if  indicated. : Toyin Hutchins MD    ANESTHESIA/SEDATION: Versed 5 mg and Fentanyl 100 mcg      PROCEDURE DESCRIPTION:  Infomed consent was obtained from the patient for the procedure. All risks and benefits of the procedure explained. The patient was taken to the endoscopy suite and placed in the left lateral decubitus position. Following sequential administration of sedation to doses as indicated above the endoscope was inserted into the mouth and advanced under direct vision to the second portion of the duodenum. Careful inspection of upper gastrointestinal tract was made as the endoscope was inserted and withdrawn. Retroflexion of the endoscope to view of the cardia of the stomach was performed. The findings and interventions are described below. FINDINGS:  Esophagus:    Sites of prior banding visualized. Necrotic tissue seem in lower esophagus. This is most likely necrosing varices following banding. One piece of tissue grasped with biopsy forceps and removed. The other piece was still attached to the esophageal wall. A biopsy of this was obtained. The remaining tissue was left in place attached to the esophageal wall. Expect this to sluff over time. The squamous mucosa was denuded in a barrets like appearance. Suspect this will re-epithelialize.   No esophageal varices wee present    Stomach:   Mild portal hypertensive gastropathy of the body of the, stomach. No gastric varices identified. Gastritis and small erosions in the antrum     Duodenum:   Normal bulb and second portion    INTERVENTION:   2 biopsies were taken from the esophagus. The specimens were placed in preservative and transported to Pathology after the procedure. COMPLICATIONS: None. The patient tolerated the procedure well. EBL: Negligible. RECOMMENDATIONS:  Observe until discharge parameters are achieved. May resume previous diet. Repeat endoscopy to reassess varices and need for banding in 2 months. Follow-up Liver [facility] [place] office as scheduled.       Paul Patel MD  8/18/2017  5:25 PM

## 2017-08-18 NOTE — PROGRESS NOTES
8025: Received patient from [address] [facility]. Denies pain. Alert and oriented x 4. Follows commands. No complaints at this time. Orders reviewed, patient to start cardene drip for elevated bp in the 180's, otherwise, VSS.

## 2017-08-18 NOTE — ROUTINE PROCESS
Bedside and Verbal shift change report given to Edwige Francis  (oncoming nurse) by Neel Smith  (offgoing nurse). Report included the following information SBAR, Kardex, Intake/Output, MAR, Recent Results, Cardiac Rhythm NSR  and Alarm Parameters .

## 2017-08-18 NOTE — PROGRESS NOTES
Spiritual Care Assessment/Progress Notes    Marlena Shah [id number]  [id number]    1955  58 y.o.  female    Patient Telephone Number: [phone] (home)   Alevism Affiliation: Caodaism   Language: Uzbek   Extended Emergency Contact Information  Primary Emergency Contact: Hernan Crenshaw  Address[address] Phone: [phone]  Mobile Phone: [phone]  Relation: Spouse   Patient Active Problem List    Diagnosis Date Noted    [facility] 08/17/2017    Symptomatic anemia 08/17/2017    Cirrhosis ([facility] 04/10/2017    Ascites 04/10/2017    Chronic hepatitis C ([facility] 01/02/2017    Obesity (BMI 30-39.9) 10/26/2016    Post-traumatic osteoarthritis of left hip 10/26/2016    Primary osteoarthritis of right knee 10/26/2016        Date: 8/18/2017       Level of Alevism/Spiritual Activity:  []         Involved in jimi tradition/spiritual practice    []         Not involved in jimi tradition/spiritual practice  []         Spiritually oriented    []         Claims no spiritual orientation    []         seeking spiritual identity  []         Feels alienated from Sikh practice/tradition  []         Feels angry about Sikh practice/tradition  []         Spirituality/Sikh tradition a resource for coping at this time.   [x]         Not able to assess due to medical condition    Services Provided Today:  []         crisis intervention    []         reading Scriptures  []         spiritual assessment    []         prayer  []         empathic listening/emotional support  []         rites and rituals (cite in comments)  []         life review     []         Sikh support  []         theological development   []         advocacy  []         ethical dialog     []         blessing  []         bereavement support    []         support to family  []         anticipatory grief support []         help with AMD  []         spiritual guidance    []         meditation      Spiritual Care Needs  []         Emotional Support  []         Spiritual/Jain Care  []         Loss/Adjustment  []         Advocacy/Referral                /Ethics  []         No needs expressed at               this time  []         Other: (note in               comments)  [address]  []         Follow up visits with               pt/family  []         Provide materials  []         Schedule sacraments  []         Contact Community               Clergy  [x]         Follow up as needed  []         Other: (note in               comments)     Responded to request from Kaur Zavaleta for pastoral support of Nigerien speaking pt. Pt in Endoscopy at time of request. Waited in ICU for patient about 15 minutes but pt remained out of room. Consulted with Amado Tan RN who provided overview. If unable to follow up today will attempt visit again on Monday. NAYANA Madera. Aida Mann

## 2017-08-18 NOTE — PROGRESS NOTES
TRANSFER - OUT REPORT:    Verbal report given to Sha MCKEON RN(name) on Jose Alfredo Floodx  being transferred to [facility](unit) for routine post - op       Report consisted of patients Situation, Background, Assessment and   Recommendations(SBAR). Information from the following report(s) SBAR, MAR and Recent Results was reviewed with the receiving nurse. Lines:   Peripheral IV 08/17/17 Right Antecubital (Active)   Site Assessment Clean, dry, & intact 8/18/2017 12:00 PM   Phlebitis Assessment 0 8/18/2017 12:00 PM   Infiltration Assessment 0 8/18/2017 12:00 PM   Dressing Status Clean, dry, & intact 8/18/2017 12:00 PM   Dressing Type Tape;Transparent 8/18/2017 12:00 PM   Hub Color/Line Status Pink; Infusing;Patent 8/18/2017 12:00 PM   Action Taken Open ports on tubing capped 8/18/2017  7:27 AM   Alcohol Cap Used Yes 8/18/2017 12:00 PM       Peripheral IV 08/18/17 Right Forearm (Active)   Site Assessment Clean, dry, & intact 8/18/2017 12:00 PM   Phlebitis Assessment 0 8/18/2017 12:00 PM   Infiltration Assessment 0 8/18/2017 12:00 PM   Dressing Status Clean, dry, & intact 8/18/2017 12:00 PM   Dressing Type Tape;Transparent 8/18/2017 12:00 PM   Hub Color/Line Status Pink; Infusing;Patent 8/18/2017 12:00 PM   Alcohol Cap Used Yes 8/18/2017 12:00 PM        Opportunity for questions and clarification was provided. Patient transported with: Transport.

## 2017-08-18 NOTE — ROUTINE PROCESS
TRANSFER - IN REPORT:    Verbal report received from Aggie(name) on [employer]  being received from [facility] (unit) for change in patient condition(hypertension)      Report consisted of patients Situation, Background, Assessment and   Recommendations(SBAR). Information from the following report(s) SBAR, Kardex, Intake/Output, MAR, Recent Results, Cardiac Rhythm NSR and Alarm Parameters  was reviewed with the receiving nurse. Opportunity for questions and clarification was provided. Assessment completed upon patients arrival to unit and care assumed.

## 2017-08-18 NOTE — PROGRESS NOTES
TRANSFER - OUT REPORT:    Verbal report given to Lincoln Harris RN (name) on Maricarmen Garcias  being transferred to [facility]) for change in patient condition(complaints of head/eye pain)       Report consisted of patients Situation, Background, Assessment and   Recommendations(SBAR). Information from the following report(s) SBAR, Kardex, Procedure Summary, Intake/Output, MAR, Accordion and Recent Results was reviewed with the receiving nurse. Lines:   Peripheral IV 08/17/17 Right Antecubital (Active)   Site Assessment Clean, dry, & intact 8/18/2017 12:09 AM   Phlebitis Assessment 0 8/18/2017 12:09 AM   Infiltration Assessment 0 8/18/2017 12:09 AM   Dressing Status Clean, dry, & intact 8/18/2017 12:09 AM   Dressing Type Transparent 8/18/2017 12:09 AM   Hub Color/Line Status Pink;Capped 8/18/2017 12:09 AM   Action Taken Open ports on tubing capped 8/18/2017 12:09 AM   Alcohol Cap Used Yes 8/18/2017 12:09 AM        Opportunity for questions and clarification was provided.       Patient transported with:   Patient-specific medications from Pharmacy

## 2017-08-18 NOTE — PROGRESS NOTES
Bedside and Verbal shift change report given to Sujata Harvey (oncoming nurse) by Aristeo Presley (offgoing nurse). Report included the following information SBAR, Kardex and MAR.

## 2017-08-18 NOTE — PROGRESS NOTES
Patient is a telemetry overflow    Thank you for allowing [facility] to participate in [facility] care. We will see again as needed.

## 2017-08-18 NOTE — PROGRESS NOTES
Pt seen and evaluated for rapid response code [facility]). Nurse reports pt having acute onset of dizziness/ lightheadedness/ headache with SBP in 200s. Patient was receiving blood transfusion. RRC was called by nurse. On arrival in the ED, ABCs were intact with GCS 15. Pt was sitting on side of bed, preferring not to lie down. Pt denies only c/o some mild itching under BP cuff, otherwise no acute other symptoms. No hives/ no SOB/ no CP. VS:  T   BP = 215/86 HR= 80  RR= 14   O2sat= 96% RA  PE:  GEN-NAD  PSYCH-A&Ox3  NEURO- GCS 15. Moves all extremities x 4. No slurred speech. No facial droop. Sensation grossly intact. HEENT-NCAT/pupils 2 mm reactive bilateral. Oropharynx clear. NECK-supple, trachea midline  LUNGS-CTA B  HEART-RRR  ABD-soft, NT,ND, + BS. No R/G/R  VASCULAR-2+ radial/1+DP pulses bilateral. No C/C/E  SKIN-warm/dry  MS-no calf tenderness    A/P:  1)  Hypertensive urgency. Ordered stat dose of Hydralazine 15 mg IV x 1 dose. Repeat BP. Monitor closely. 2)  Headache - likely d/t hypertension. Will however obtain CT head to r/o acute process. Order neurovascular checks. 3)  Dizziness. Place on fall precautions and advised patient to lie supine in bed. Plan as above. Current systems are not consistent with blood transfusion reaction. 4)  Anemia. Continue PRBCs transfusion.

## 2017-08-18 NOTE — PROGRESS NOTES
While en route to CT IV cartridge bursted in the IV pump causing a loss of approximately 100mls of blood transfusion. Complaints of increase pain to head and right eye dizziness states \"I feel funny\". MD notified.  New orders given transfer to [facility].       08/18/17 0549   Vital Signs   Temp 98.7 °F (37.1 °C)   Temp Source Oral   Pulse (Heart Rate) 87   Heart Rate Source Monitor   Resp Rate 18   O2 Sat (%) 98 %   Level of Consciousness Alert   /64   MAP (Calculated) 107   BP 1 Method Automatic   BP 1 Location Left arm   BP Patient Position At rest   MEWS Score 1   Pain 1   Pain Scale 1 Numeric (0 - 10)   Pain Intensity 1 4   Patient Stated Pain Goal 0   Pain Reassessment 1 Yes   Pain Onset 1 acute   Pain Location 1 Head   Pain Orientation 1 Right   Pain Description 1 Aching   Pain Intervention(s) 1 Medication (see MAR);MD notified (comment)

## 2017-08-18 NOTE — PROGRESS NOTES
0800 Patient received awake in bed, assisted up to [facility]. Alert and oriented X4, NSR on monitor, BP 170s (cardene just started). Patient without any complaints of pain or shortness of breath. 1020 PRBCs started, will monitor closely for any s/s of reaction. 1220 PRBCs completed without any s/s of transfusion reaction. 1315 /60, cardene stopped. 1600 Patient transported to endo for procedure. 1815 Patient received back from ENDO, vitals stable. Patient oriented X4. Shift Summary:  Patient received last unit PRBCs without complication. Remains off cardene gtt with SBP 140s. Transported to endo for EGD. Vitals stable.

## 2017-08-18 NOTE — TELEPHONE ENCOUNTER
Incoming call from [facility] in ICU wanted to know if Elyssa Garcia was going to perform EGD and whether or not patient should remain NPO. Informed her Per Dr. Elyssa Glez EGD will be done at 5pm today after he has completed seeing patients. Patient shall remain NPO.

## 2017-08-19 LAB
ERYTHROCYTE [DISTWIDTH] IN BLOOD BY AUTOMATED COUNT: 18.5 % (ref 11.5–14.5)
HCT VFR BLD AUTO: 31.8 % (ref 35–47)
HGB BLD-MCNC: 9.4 G/DL (ref 11.5–16)
MCH RBC QN AUTO: 25.1 PG (ref 26–34)
MCHC RBC AUTO-ENTMCNC: 29.6 G/DL (ref 30–36.5)
MCV RBC AUTO: 84.8 FL (ref 80–99)
PLATELET # BLD AUTO: 72 K/UL (ref 150–400)
RBC # BLD AUTO: 3.75 M/UL (ref 3.8–5.2)
WBC # BLD AUTO: 3.5 K/UL (ref 3.6–11)

## 2017-08-19 PROCEDURE — 65660000001 HC RM ICU INTERMED STEPDOWN

## 2017-08-19 PROCEDURE — 85027 COMPLETE CBC AUTOMATED: CPT | Performed by: INTERNAL MEDICINE

## 2017-08-19 PROCEDURE — 74011250637 HC RX REV CODE- 250/637: Performed by: HOSPITALIST

## 2017-08-19 PROCEDURE — 36415 COLL VENOUS BLD VENIPUNCTURE: CPT | Performed by: INTERNAL MEDICINE

## 2017-08-19 PROCEDURE — 74011250636 HC RX REV CODE- 250/636: Performed by: INTERNAL MEDICINE

## 2017-08-19 PROCEDURE — 77030032490 HC SLV COMPR SCD KNE COVD -B

## 2017-08-19 PROCEDURE — 74011250637 HC RX REV CODE- 250/637: Performed by: INTERNAL MEDICINE

## 2017-08-19 RX ORDER — SUCRALFATE 1 G/10ML
1 SUSPENSION ORAL EVERY 6 HOURS
Status: DISCONTINUED | OUTPATIENT
Start: 2017-08-19 | End: 2017-08-20 | Stop reason: HOSPADM

## 2017-08-19 RX ORDER — METOPROLOL SUCCINATE 25 MG/1
25 TABLET, EXTENDED RELEASE ORAL DAILY
Status: DISCONTINUED | OUTPATIENT
Start: 2017-08-20 | End: 2017-08-20 | Stop reason: HOSPADM

## 2017-08-19 RX ADMIN — FUROSEMIDE 40 MG: 40 TABLET ORAL at 08:20

## 2017-08-19 RX ADMIN — SPIRONOLACTONE 100 MG: 100 TABLET, FILM COATED ORAL at 08:20

## 2017-08-19 RX ADMIN — IRON SUCROSE 300 MG: 20 INJECTION, SOLUTION INTRAVENOUS at 11:12

## 2017-08-19 RX ADMIN — SUCRALFATE 1 G: 1 SUSPENSION ORAL at 11:32

## 2017-08-19 RX ADMIN — SUCRALFATE 1 G: 1 SUSPENSION ORAL at 17:30

## 2017-08-19 RX ADMIN — DOCUSATE SODIUM 100 MG: 100 CAPSULE, LIQUID FILLED ORAL at 08:20

## 2017-08-19 RX ADMIN — Medication 5 ML: at 14:28

## 2017-08-19 RX ADMIN — Medication 5 ML: at 21:22

## 2017-08-19 RX ADMIN — ACETAMINOPHEN 650 MG: 325 TABLET, FILM COATED ORAL at 16:25

## 2017-08-19 RX ADMIN — Medication 5 ML: at 06:56

## 2017-08-19 RX ADMIN — DOCUSATE SODIUM 100 MG: 100 CAPSULE, LIQUID FILLED ORAL at 17:30

## 2017-08-19 NOTE — PROGRESS NOTES
Hospitalist Progress Note  Kaitlin Leo MD  Office: [phone]  Cell: [phone]      Date of Service:  2017  NAME:  Len Maldonado  :  1955  MRN:  [id number]      Admission Summary:     Len Maldonado is a 58 y.o. female with past medical history of cirrhosis, HCV who was admitted for  Low hemoglobin. Interval history / Subjective:       F/u for anemia. She is s/p 2 unit of PRBC transfusion. He has no complaints at this time. Feeling better today. She is s/p EGD yersterday which showed sites of prior banding with necrotic tissue seen in lower   Esophagus. Mild portal gastropathy of the body of the stomach. Assessment & Plan:     Blood loss anemia: This is likely secondary to chronic GI blood loss. Hb on admit was 6.5. She is s/p 2 unit of PRBC transfusion. Hb now 9.4. Continue venofer 300 mg I.V daily for iron deficiency anemia. Liver Cirrhosis: Secondary to chronic hepatitis C virus. Meld score of 12. Continue step 1 diuretics. Hepatology following    Thrombocytopenia: Secondary to liver cirrhosis    Esophageal Varices s/p banding    Elevated blood pressure: Likely due to essential HTN  Stage 2. Start metoprolol 25 mg BID. Monitor      Code status: Full  DVT prophylaxis:  SCD    Care Plan discussed with: Patient/Family and Nurse  Disposition: TBD, likely discharge within the next 1-2 days     Hospital Problems  Date Reviewed: 2017          Codes Class Noted POA    Symptomatic anemia ICD-10-CM: D64.9  ICD-9-CM: 285.9  2017 Unknown                Review of Systems:   Pertinent items are noted in HPI. Vital Signs:    Last 24hrs VS reviewed since prior progress note.  Most recent are:  Visit Vitals    /55 (BP 1 Location: Left arm, BP Patient Position: At rest)    Pulse 75    Temp 98 °F (36.7 °C)    Resp 16    Ht 5' 4\" (1.626 m)    Wt 88.6 kg (195 lb 6.4 oz)    SpO2 98%    BMI 33.54 kg/m2         Intake/Output Summary (Last 24 hours) at 08/19/17 1530  Last data filed at 08/19/17 1200   Gross per 24 hour   Intake              625 ml   Output              500 ml   Net              125 ml        Physical Examination:             Constitutional:  No acute distress, cooperative, pleasant    ENT:  Oral mucous moist, oropharynx benign. Neck supple, +ve icteric   Resp:  CTA bilaterally. No wheezing/rhonchi/rales. No accessory muscle use   CV:  Regular rhythm, normal rate, no murmurs, gallops, rubs    GI:  Soft, non distended, non tender. normoactive bowel sounds, no hepatosplenomegaly     Musculoskeletal:  No edema, warm, 2+ pulses throughout    Neurologic:  Moves all extremities. AAOx3, CN II-XII reviewed            Data Review:        Labs:     Recent Labs      08/19/17   1111  08/18/17   1321   WBC  3.5*  2.7*   HGB  9.4*  8.6*   HCT  31.8*  29.1*   PLT  72*  72*     Recent Labs      08/18/17   1321  08/17/17   1554   NA  142  142   K  3.6  4.0   CL  110*  111*   CO2  24  22   BUN  9  12   CREA  0.56  0.51*   GLU  105*  104*   CA  8.4*  8.3*   MG  2.1   --    PHOS  3.8   --      Recent Labs      08/17/17   1554   SGOT  19   ALT  28   AP  105   TBILI  0.9   TP  7.1   ALB  3.1*   GLOB  4.0     No results for input(s): INR, PTP, APTT in the last 72 hours. No lab exists for component: INREXT, INREXT   Recent Labs      08/17/17   1729  08/17/17   1554   TIBC  519*  505*   PSAT  4*  5*   FERR  7*  7*      No results found for: FOL, RBCF   No results for input(s): PH, PCO2, PO2 in the last 72 hours. No results for input(s): CPK, CKNDX, TROIQ in the last 72 hours.     No lab exists for component: CPKMB  No results found for: CHOL, CHOLX, CHLST, CHOLV, HDL, LDL, LDLC, DLDLP, TGLX, TRIGL, TRIGP, CHHD, CHHDX  No results found for: [facility]  Lab Results   Component Value Date/Time    Color YELLOW 11/06/2016 12:23 PM    Appearance CLOUDY 11/06/2016 12:23 PM    Specific gravity 1.025 11/06/2016 12:23 PM pH (UA) 6.0 11/06/2016 12:23 PM    Protein 30 11/06/2016 12:23 PM    Glucose NEGATIVE  11/06/2016 12:23 PM    Ketone TRACE 11/06/2016 12:23 PM    Urobilinogen 1.0 11/06/2016 12:23 PM    Nitrites NEGATIVE  11/06/2016 12:23 PM    Leukocyte Esterase SMALL 11/06/2016 12:23 PM    Epithelial cells FEW 11/06/2016 12:23 PM    Bacteria NEGATIVE  11/06/2016 12:23 PM    WBC 0-4 11/06/2016 12:23 PM    RBC >100 11/06/2016 12:23 PM         Medications Reviewed:     Current Facility-Administered Medications   Medication Dose Route Frequency    iron sucrose (VENOFER) 300 mg in 0.9% sodium chloride 250 mL IVPB  300 mg IntraVENous Q24H    sucralfate (CARAFATE) 100 mg/mL oral suspension 1 g  1 g Oral Q6H    0.9% sodium chloride infusion 250 mL  250 mL IntraVENous PRN    hydrALAZINE (APRESOLINE) 20 mg/mL injection 10 mg  10 mg IntraVENous Q6H PRN    saline peripheral flush soln 5 mL  5 mL InterCATHeter PRN    SALINE PERIPHERAL FLUSH Q8H soln 5 mL  5 mL InterCATHeter Q8H    furosemide (LASIX) tablet 40 mg  40 mg Oral DAILY    spironolactone (ALDACTONE) tablet 100 mg  100 mg Oral DAILY    acetaminophen (TYLENOL) tablet 650 mg  650 mg Oral Q4H PRN    docusate sodium (COLACE) capsule 100 mg  100 mg Oral BID     ______________________________________________________________________  EXPECTED LENGTH OF STAY: - - -  ACTUAL LENGTH OF STAY:          1                 Reji Kim MD

## 2017-08-19 NOTE — PROGRESS NOTES
Problem: Falls - Risk of  Goal: *Absence of Falls  Document Vivienne Fall Risk and appropriate interventions in the flowsheet.    Outcome: Progressing Towards Goal  Fall Risk Interventions:  Mobility Interventions: Assess mobility with egress test, Patient to call before getting OOB, Communicate number of staff needed for ambulation/transfer, Utilize gait belt for transfers/ambulation                 Elimination Interventions: Call light in reach, Patient to call for help with toileting needs, Toileting schedule/hourly rounds

## 2017-08-19 NOTE — PROGRESS NOTES
Hospitalist Progress Note  Christiano Sebastian MD  Office: [phone]  Cell: [phone]      Date of Service:  2017  NAME:  Megan Liang  :  1955  MRN:  [id number]      Admission Summary:     Megan Liang is a 58 y.o. female with past medical history of cirrhosis, HCV who was admitted for  Low hemoglobin. Interval history / Subjective:       F/u for anemia. She is s/p 2 unit of PRBC transfusion. He has no complaints at this time     Assessment & Plan:     Blood loss anemia: This is likely secondary to chronic GI blood loss. Hb on admit was 6.5. She is s/p 2 unit of PRBC transfusion. F/u on post transfusion CBC  She is currently NPO for EGD later today. Liver Cirrhosis: Secondary to chronic hepatitis C virus. F/u with hepatology for HCV treatment    Thrombocytopenia: Secondary to liver cirrhosis    Esophageal Varices s/p banding    Elevated blood pressure: Start hydralazine prn SBP >160 mm hg      Code status: Full  DVT prophylaxis:  SCD    Care Plan discussed with: Patient/Family and Nurse  Disposition: TBD     Hospital Problems  Date Reviewed: 2017          Codes Class Noted POA    Symptomatic anemia ICD-10-CM: D64.9  ICD-9-CM: 285.9  2017 Unknown                Review of Systems:   Pertinent items are noted in HPI. Vital Signs:    Last 24hrs VS reviewed since prior progress note.  Most recent are:  Visit Vitals    /55 (BP 1 Location: Left arm, BP Patient Position: At rest;Supine)    Pulse 80    Temp 98.5 °F (36.9 °C)    Resp 14    Ht 5' 4\" (1.626 m)    Wt 81.8 kg (180 lb 6.4 oz)    SpO2 100%    BMI 30.97 kg/m2         Intake/Output Summary (Last 24 hours) at 17  Last data filed at 17 1515   Gross per 24 hour   Intake           747.13 ml   Output             1350 ml   Net          -602.87 ml        Physical Examination:             Constitutional:  No acute distress, cooperative, pleasant    ENT:  Oral mucous moist, oropharynx benign. Neck supple, +ve icteric   Resp:  CTA bilaterally. No wheezing/rhonchi/rales. No accessory muscle use   CV:  Regular rhythm, normal rate, no murmurs, gallops, rubs    GI:  Soft, non distended, non tender. normoactive bowel sounds, no hepatosplenomegaly     Musculoskeletal:  No edema, warm, 2+ pulses throughout    Neurologic:  Moves all extremities. AAOx3, CN II-XII reviewed            Data Review:        Labs:     Recent Labs      08/18/17   1321  08/17/17   1554   WBC  2.7*  2.2*   HGB  8.6*  6.5*   HCT  29.1*  23.2*   PLT  72*  63*     Recent Labs      08/18/17   1321  08/17/17   1554   NA  142  142   K  3.6  4.0   CL  110*  111*   CO2  24  22   BUN  9  12   CREA  0.56  0.51*   GLU  105*  104*   CA  8.4*  8.3*   MG  2.1   --    PHOS  3.8   --      Recent Labs      08/17/17   1554   SGOT  19   ALT  28   AP  105   TBILI  0.9   TP  7.1   ALB  3.1*   GLOB  4.0     No results for input(s): INR, PTP, APTT in the last 72 hours. No lab exists for component: INREXT   Recent Labs      08/17/17   1729  08/17/17   1554   TIBC  519*  505*   PSAT  4*  5*   FERR  7*  7*      No results found for: FOL, RBCF   No results for input(s): PH, PCO2, PO2 in the last 72 hours. No results for input(s): CPK, CKNDX, TROIQ in the last 72 hours.     No lab exists for component: CPKMB  No results found for: CHOL, CHOLX, CHLST, CHOLV, HDL, LDL, LDLC, DLDLP, TGLX, TRIGL, TRIGP, CHHD, CHHDX  No results found for: [facility]  Lab Results   Component Value Date/Time    Color YELLOW 11/06/2016 12:23 PM    Appearance CLOUDY 11/06/2016 12:23 PM    Specific gravity 1.025 11/06/2016 12:23 PM    pH (UA) 6.0 11/06/2016 12:23 PM    Protein 30 11/06/2016 12:23 PM    Glucose NEGATIVE  11/06/2016 12:23 PM    Ketone TRACE 11/06/2016 12:23 PM    Urobilinogen 1.0 11/06/2016 12:23 PM    Nitrites NEGATIVE  11/06/2016 12:23 PM    Leukocyte Esterase SMALL 11/06/2016 12:23 PM    Epithelial cells FEW 11/06/2016 12:23 PM    Bacteria NEGATIVE  11/06/2016 12:23 PM    WBC 0-4 11/06/2016 12:23 PM    RBC >100 11/06/2016 12:23 PM         Medications Reviewed:     Current Facility-Administered Medications   Medication Dose Route Frequency    niCARdipine (CARDENE) 25 mg in 0.9% sodium chloride 250 mL infusion  0-15 mg/hr IntraVENous TITRATE    0.9% sodium chloride infusion 250 mL  250 mL IntraVENous PRN    hydrALAZINE (APRESOLINE) 20 mg/mL injection 10 mg  10 mg IntraVENous Q6H PRN    0.9% sodium chloride infusion 50 mL  50 mL IntraVENous CONTINUOUS    sodium chloride (NS) flush 5-10 mL  5-10 mL IntraVENous Q8H    sodium chloride (NS) flush 5-10 mL  5-10 mL IntraVENous PRN    fentaNYL citrate (PF) 50 mcg/mL injection        saline peripheral flush soln 5 mL  5 mL InterCATHeter PRN    SALINE PERIPHERAL FLUSH Q8H soln 5 mL  5 mL InterCATHeter Q8H    furosemide (LASIX) tablet 40 mg  40 mg Oral DAILY    spironolactone (ALDACTONE) tablet 100 mg  100 mg Oral DAILY    acetaminophen (TYLENOL) tablet 650 mg  650 mg Oral Q4H PRN    docusate sodium (COLACE) capsule 100 mg  100 mg Oral BID     ______________________________________________________________________  EXPECTED LENGTH OF STAY: - - -  ACTUAL LENGTH OF STAY:          0                 Dimple Merida MD

## 2017-08-19 NOTE — ROUTINE PROCESS
Bedside and verbal shift change report given to Demond Mendez (oncoming nurse) by Jewel Orozco (offgoing nurse). Report included the following information SBAR, Kardex, Intake/Output, MAR, Recent Results, Cardiac Rhythm NSR and Alarm Parameters .

## 2017-08-19 NOTE — ROUTINE PROCESS
TRANSFER - OUT REPORT:    Verbal report given to Francoise Palomares, (name) on Clifford Orellana  being transferred to [facility](unit) for routine post - op       Report consisted of patients Situation, Background, Assessment and   Recommendations(SBAR). Information from the following report(s) SBAR, Kardex, [facility] and Recent Results was reviewed with the receiving nurse. Lines:   Peripheral IV 08/17/17 Right Antecubital (Active)   Site Assessment Clean, dry, & intact 8/19/2017 12:00 PM   Phlebitis Assessment 0 8/19/2017 12:00 PM   Infiltration Assessment 0 8/19/2017 12:00 PM   Dressing Status Clean, dry, & intact 8/19/2017 12:00 PM   Dressing Type Transparent;Tape 8/19/2017 12:00 PM   Hub Color/Line Status Pink; Infusing 8/19/2017 12:00 PM   Action Taken Open ports on tubing capped 8/19/2017 12:00 PM   Alcohol Cap Used Yes 8/19/2017 12:00 PM        Opportunity for questions and clarification was provided.       Patient transported with:   Monitor  Registered Nurse  Tech

## 2017-08-19 NOTE — PROGRESS NOTES
[facility] Shani Rosas MD, Jill Stein, FAASLD       Sin Metcalf, JAMIL Bowling PA-C [address] [facility], JAMIL Stover , NP   [address] [address], [address] [address]  [place], [address] [place], [place] [address]y     [phone]     FAX: [phone] [facility]  1000 [facility], [address] [facility], [address] [facility]     [phone]     FAX: [phone]   [facility]  HEPATOLOGY PROGRESS NOTE  The patient is well known to me and regularly cared for at [facility]. Tegan Bustillo is a 58year old  female with cirrhosis secondary to chronic HCV.  She was found to have chronic HCV and cirrhosis in 2/2017 when she was hospitalized at [facility] for ascites.  Risk factors for HCV are blood transfusions as a child in [address] [place] [address] Rd has resolved with Diuretics and paracentesis. Tegan Bustillo has not developed edema or hepatic encephalopathy.  EGD in 8/2017 demonstrated multiple large esophageal varices which were banded.      She was seen in the [facility] on the day of admission because of weakness.  She was found to have a HB of 6 and was sent to the ED.      She was given some blood after admission. She developed hypertension and headache and has been in ICU for BP control.       EGD today demonstrated necrotic tissue in the esophagus which is remnant of prior banding. No Varices were present. There was not active bleeding.     ASSESSMENT AND PLAN:  Cirrhosis  This is secondary to chronic HCV.  The CTP score is 7, Child class A, MELD score 12.        Chronic HCV  This needs to be treated.  Would use Harvoni since she had prior decompensation with ascites. Will get treatment started as soon as we see her back in the office after this hospitalization. Esophagitis  She has severe esophagitis with necrotic tissue from previous banding. Will give her Carafate Q6H. She has no varices.   All were banded.      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  She was given blood. She needs IV FE to replace FE stores. This will never happen with oral FE. Will give 300 mg of Venofer every day for 3 doses      Thrombocytopenia  This is secondary to cirrhosis.  No treatment needed.      PHYSICAL EXAMINATION:  VS: per nursing note  General:  No acute distress. Eyes:  Sclera anicteric. ENT:  No oral lesions.  Thyroid normal.  Nodes:  No adenopathy. Skin:  Spider angiomata.  No jaundice.    Respiratory:  Lungs clear to auscultation. Cardiovascular:  Regular heart rate. Abdomen:  Soft non-tender, No obvious ascites. Extremities:  No lower extremity edema.    Neurologic:  Alert and oriented.  Cranial nerves grossly intact.  No asterixis.      LABORATORY:  Results for Priti Hernandez (MRN [id number]) as of 8/19/2017 10:38   Ref. Range 8/17/2017 15:54 8/18/2017 13:21   WBC Latest Ref Range: 3.6 - 11.0 K/uL 2.2 (L) 2.7 (L)   HGB Latest Ref Range: 11.5 - 16.0 g/dL 6.5 (L) 8.6 (L)   PLATELET Latest Ref Range: 150 - 400 K/uL 63 (L) 72 (L)   Sodium Latest Ref Range: 136 - 145 mmol/L 142 142   Potassium Latest Ref Range: 3.5 - 5.1 mmol/L 4.0 3.6   Chloride Latest Ref Range: 97 - 108 mmol/L 111 (H) 110 (H)   CO2 Latest Ref Range: 21 - 32 mmol/L 22 24   Glucose Latest Ref Range: 65 - 100 mg/dL 104 (H) 105 (H)   BUN Latest Ref Range: 6 - 20 MG/DL 12 9   Creatinine Latest Ref Range: 0.55 - 1.02 MG/DL 0.51 (L) 0.56   Bilirubin, total Latest Ref Range: 0.2 - 1.0 MG/DL 0.9    Albumin Latest Ref Range: 3.5 - 5.0 g/dL 3.1 (L)    ALT (SGPT) Latest Ref Range: 12 - 78 U/L 28    AST Latest Ref Range: 15 - 37 U/L 19    Alk.  phosphatase Latest Ref Range: 45 - 117 U/L [address] [facility], MD  [facility] [place] [address] [address] Ankit Clement 7  Saul Perez  22.  [phone]

## 2017-08-19 NOTE — PROGRESS NOTES
Shift Summary: Patient had an uneventful shift. Patient had periods of bp in the 170's-180's, but after rechecking and reposition cuff, bp spontaneously returned to specified parameters without intervention, otherwise VSS. Alert and oriented x4. Denies pain. Patient slept my entire shift. Voiding without difficulty. CAM negative.

## 2017-08-19 NOTE — PROGRESS NOTES
0730- Bedside report received from Georgia Moreno RN, using SBAR format. Pt resting quietly in chair at this time. Denies pain. SBP < 160 without intervention. 1111- CBC sent to lab.     1112- Iron sucrose given IV x 1 per MD order. 1430-Pt transferred to [facility] with RN and PCT and all belongings. Family aware of transfer.  VSS

## 2017-08-19 NOTE — PROGRESS NOTES
TRANSFER - IN REPORT:    Verbal report received from [address] on [address]  being received from ICU for routine progression of care      Report consisted of patients Situation, Background, Assessment and   Recommendations(SBAR). Information from the following report(s) SBAR was reviewed with the receiving nurse. Opportunity for questions and clarification was provided. Assessment completed upon patients arrival to unit and care assumed.

## 2017-08-20 VITALS
WEIGHT: 199.74 LBS | DIASTOLIC BLOOD PRESSURE: 55 MMHG | TEMPERATURE: 98.3 F | OXYGEN SATURATION: 97 % | SYSTOLIC BLOOD PRESSURE: 164 MMHG | HEART RATE: 67 BPM | RESPIRATION RATE: 16 BRPM | HEIGHT: 64 IN | BODY MASS INDEX: 34.1 KG/M2

## 2017-08-20 LAB
ANION GAP SERPL CALC-SCNC: 10 MMOL/L (ref 5–15)
BUN SERPL-MCNC: 17 MG/DL (ref 6–20)
BUN/CREAT SERPL: 24 (ref 12–20)
CALCIUM SERPL-MCNC: 8.1 MG/DL (ref 8.5–10.1)
CHLORIDE SERPL-SCNC: 108 MMOL/L (ref 97–108)
CO2 SERPL-SCNC: 23 MMOL/L (ref 21–32)
CREAT SERPL-MCNC: 0.71 MG/DL (ref 0.55–1.02)
ERYTHROCYTE [DISTWIDTH] IN BLOOD BY AUTOMATED COUNT: 18.5 % (ref 11.5–14.5)
GLUCOSE SERPL-MCNC: 182 MG/DL (ref 65–100)
HCT VFR BLD AUTO: 29 % (ref 35–47)
HGB BLD-MCNC: 8.5 G/DL (ref 11.5–16)
MCH RBC QN AUTO: 25 PG (ref 26–34)
MCHC RBC AUTO-ENTMCNC: 29.3 G/DL (ref 30–36.5)
MCV RBC AUTO: 85.3 FL (ref 80–99)
PLATELET # BLD AUTO: 71 K/UL (ref 150–400)
POTASSIUM SERPL-SCNC: 3.2 MMOL/L (ref 3.5–5.1)
RBC # BLD AUTO: 3.4 M/UL (ref 3.8–5.2)
SODIUM SERPL-SCNC: 141 MMOL/L (ref 136–145)
WBC # BLD AUTO: 2.4 K/UL (ref 3.6–11)

## 2017-08-20 PROCEDURE — 74011250636 HC RX REV CODE- 250/636: Performed by: HOSPITALIST

## 2017-08-20 PROCEDURE — 74011250637 HC RX REV CODE- 250/637: Performed by: HOSPITALIST

## 2017-08-20 PROCEDURE — 36415 COLL VENOUS BLD VENIPUNCTURE: CPT | Performed by: HOSPITALIST

## 2017-08-20 PROCEDURE — 74011250637 HC RX REV CODE- 250/637: Performed by: INTERNAL MEDICINE

## 2017-08-20 PROCEDURE — 85027 COMPLETE CBC AUTOMATED: CPT | Performed by: HOSPITALIST

## 2017-08-20 PROCEDURE — 80048 BASIC METABOLIC PNL TOTAL CA: CPT | Performed by: HOSPITALIST

## 2017-08-20 RX ORDER — METOPROLOL SUCCINATE 25 MG/1
25 TABLET, EXTENDED RELEASE ORAL DAILY
Qty: 30 TAB | Refills: 0 | Status: SHIPPED | OUTPATIENT
Start: 2017-08-20 | End: 2017-08-24 | Stop reason: SINTOL

## 2017-08-20 RX ORDER — SUCRALFATE 1 G/10ML
1 SUSPENSION ORAL EVERY 6 HOURS
Qty: 100 ML | Refills: 0 | Status: SHIPPED | OUTPATIENT
Start: 2017-08-20 | End: 2018-01-26 | Stop reason: ALTCHOICE

## 2017-08-20 RX ORDER — POTASSIUM CHLORIDE 750 MG/1
20 TABLET, FILM COATED, EXTENDED RELEASE ORAL
Status: COMPLETED | OUTPATIENT
Start: 2017-08-20 | End: 2017-08-20

## 2017-08-20 RX ORDER — PROPRANOLOL HYDROCHLORIDE 20 MG/1
20 TABLET ORAL 2 TIMES DAILY
Qty: 60 TAB | Refills: 0 | Status: SHIPPED | OUTPATIENT
Start: 2017-08-20 | End: 2017-08-24

## 2017-08-20 RX ORDER — LANOLIN ALCOHOL/MO/W.PET/CERES
325 CREAM (GRAM) TOPICAL 2 TIMES DAILY
Qty: 60 TAB | Refills: 0 | Status: SHIPPED | OUTPATIENT
Start: 2017-08-20 | End: 2018-05-10 | Stop reason: SDUPTHER

## 2017-08-20 RX ORDER — PROPRANOLOL HYDROCHLORIDE 10 MG/1
10 TABLET ORAL 3 TIMES DAILY
Qty: 90 TAB | Refills: 0 | Status: SHIPPED | OUTPATIENT
Start: 2017-08-20 | End: 2017-08-24

## 2017-08-20 RX ADMIN — POTASSIUM CHLORIDE 20 MEQ: 750 TABLET, FILM COATED, EXTENDED RELEASE ORAL at 13:19

## 2017-08-20 RX ADMIN — SUCRALFATE 1 G: 1 SUSPENSION ORAL at 01:32

## 2017-08-20 RX ADMIN — METOPROLOL SUCCINATE 25 MG: 25 TABLET, EXTENDED RELEASE ORAL at 08:22

## 2017-08-20 RX ADMIN — SPIRONOLACTONE 100 MG: 100 TABLET, FILM COATED ORAL at 08:23

## 2017-08-20 RX ADMIN — IRON SUCROSE 300 MG: 20 INJECTION, SOLUTION INTRAVENOUS at 09:47

## 2017-08-20 RX ADMIN — SUCRALFATE 1 G: 1 SUSPENSION ORAL at 12:11

## 2017-08-20 RX ADMIN — FUROSEMIDE 40 MG: 40 TABLET ORAL at 08:23

## 2017-08-20 RX ADMIN — SUCRALFATE 1 G: 1 SUSPENSION ORAL at 05:07

## 2017-08-20 RX ADMIN — Medication 5 ML: at 05:07

## 2017-08-20 RX ADMIN — Medication 5 ML: at 13:20

## 2017-08-20 NOTE — DISCHARGE INSTRUCTIONS
Patient Discharge Instructions    Radha Solano / [id number] : 1955    Admitted 2017 Discharged: 2017 2:39 PM     ACUTE DIAGNOSES:  Symptomatic anemia  EGD  Symptomatic anemia    CHRONIC MEDICAL DIAGNOSES:  Problem List as of 2017  Date Reviewed: 2017          Codes Class Noted - Resolved    Secondary esophageal varices without bleeding ([facility] ICD-10-CM: I85.10  ICD-9-CM: 456.21  2017 - Present        Symptomatic anemia ICD-10-CM: D64.9  ICD-9-CM: 285.9  2017 - Present        Cirrhosis ([facility].) ICD-10-CM: K74.60  ICD-9-CM: 571.5  4/10/2017 - Present        Ascites ICD-10-CM: R18.8  ICD-9-CM: 789.59  4/10/2017 - Present        Chronic hepatitis C ([facility].) ICD-10-CM: B18.2  ICD-9-CM: 070.54  2017 - Present        Obesity (BMI 30-39. 9) ICD-10-CM: E66.9  ICD-9-CM: 278.00  10/26/2016 - Present        Post-traumatic osteoarthritis of left hip ICD-10-CM: M16.52  ICD-9-CM: 715.25  10/26/2016 - Present        Primary osteoarthritis of right knee ICD-10-CM: M17.11  ICD-9-CM: 715.16  10/26/2016 - Present              DISCHARGE MEDICATIONS:           · It is important that you take the medication exactly as they are prescribed. · Keep your medication in the bottles provided by the pharmacist and keep a list of the medication names, dosages, and times to be taken in your wallet. · Do not take other medications without consulting your doctor. DIET:  Regular Diet    ACTIVITY: Activity as tolerated    ADDITIONAL INFORMATION: If you experience any of the following symptoms then please call your primary care physician or return to the emergency room if you cannot get hold of your doctor: Fever, chills, nausea, vomiting, diarrhea, change in mentation, falling, bleeding, shortness of breath. FOLLOW UP CARE:  Dr. Per Goldberg MD.  Please call and set up an appointment to see them in 2 weeks.     Call Dr Elisa Seth  at the number below and set up an appointment to see them in 1 week:        Information obtained by :  I understand that if any problems occur once I am at home I am to contact my physician. I understand and acknowledge receipt of the instructions indicated above.                                                                                                                                            Physician's or R.N.'s Signature                                                                  Date/Time                                                                                                                                              Patient or Representative Signature                                                          Date/Time

## 2017-08-20 NOTE — ROUTINE PROCESS
Bedside shift change report given to Sofia (oncoming nurse) by KADE Sanders (offgoing nurse).  Report included the following information SBAR, Kardex, MAR and Recent Results.

## 2017-08-20 NOTE — PROGRESS NOTES
[address] [facility]        Aden Melendez MD, Natalya Pringle, FAASLD       Chrissy Holloway, JAMIL Marie PA-C [address] [facility], JAMIL Alicea, NP   [address] [address], [address] [facility]  [place], [address] [place] [address]     [phone]     FAX: [phone]  [address] [facility] [address], [address] [facility], [address] [facility]     [phone]     FAX: [phone]   Juan Stephenson  HEPATOLOGY PROGRESS NOTE  The patient is well known to me and regularly cared for at [address] [address]. Christy Gutierrez is a 58year old  female with cirrhosis secondary to chronic HCV.  She was found to have chronic HCV and cirrhosis in 2/2017 when she was hospitalized at [facility] for ascites.  Risk factors for HCV are blood transfusions as a child in [address] [place], [place] [address] Rd has resolved with Diuretics and paracentesis. Christy Gutierrez has not developed edema or hepatic encephalopathy.  EGD in 8/2017 demonstrated multiple large esophageal varices which were banded.      She was seen in the [facility] on the day of admission because of weakness.  She was found to have a HB of 6 and was sent to the ED.  Kimberly Frederick  She was given some blood after admission and 2 FE infusions. Her BP is well controlled and she can go home today. My office will contact her and set up additional FE infusions to correct FE deficiency and follow-up.      ASSESSMENT AND PLAN:  Cirrhosis  This is secondary to chronic HCV.  The CTP score is 7, Child class A, MELD score 12.        Chronic HCV  This needs to be treated.  Would use Harvoni since she had prior decompensation with ascites.  Will get treatment started as soon as we see her back in the office after this hospitalization.     Esophagitis  She has severe esophagitis with necrotic tissue from previous banding. Will give her Carafate Q6H. She has no varices.   All were banded.      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  She was given blood.  She needs IV FE to replace FE stores.  This will never happen with oral FE. Will give 300 mg of Venofer every day for 3 doses      Thrombocytopenia  This is secondary to cirrhosis.  No treatment needed.      PHYSICAL EXAMINATION:  VS: per nursing note  General:  No acute distress. Eyes:  Sclera anicteric. ENT:  No oral lesions.  Thyroid normal.  Nodes:  No adenopathy. Skin:  Spider angiomata.  No jaundice.    Respiratory:  Lungs clear to auscultation. Cardiovascular:  Regular heart rate. Abdomen:  Soft non-tender, No obvious ascites. Extremities:  No lower extremity edema.    Neurologic:  Alert and oriented.  Cranial nerves grossly intact.  No asterixis.      LABORATORY:  Results for So Duran (MRN [id number]) as of 8/20/2017 14:19   Ref. Range 8/17/2017 15:54 8/18/2017 13:21 8/19/2017 11:11 8/20/2017 09:32   WBC Latest Ref Range: 3.6 - 11.0 K/uL 2.2 (L) 2.7 (L) 3.5 (L) 2.4 (L)   HGB Latest Ref Range: 11.5 - 16.0 g/dL 6.5 (L) 8.6 (L) 9.4 (L) 8.5 (L)   PLATELET Latest Ref Range: 150 - 400 K/uL 63 (L) 72 (L) 72 (L) 71 (L)   Sodium Latest Ref Range: 136 - 145 mmol/L 142 142  141   Potassium Latest Ref Range: 3.5 - 5.1 mmol/L 4.0 3.6  3.2 (L)   Chloride Latest Ref Range: 97 - 108 mmol/L 111 (H) 110 (H)  108   CO2 Latest Ref Range: 21 - 32 mmol/L 22 24  23   Glucose Latest Ref Range: 65 - 100 mg/dL 104 (H) 105 (H)  182 (H)   BUN Latest Ref Range: 6 - 20 MG/DL 12 9  17   Creatinine Latest Ref Range: 0.55 - 1.02 MG/DL 0.51 (L) 0.56  0.71   Bilirubin, total Latest Ref Range: 0.2 - 1.0 MG/DL 0.9      Albumin Latest Ref Range: 3.5 - 5.0 g/dL 3.1 (L)      ALT (SGPT) Latest Ref Range: 12 - 78 U/L 28      AST Latest Ref Range: 15 - 37 U/L 19      Alk.  phosphatase Latest Ref Range: 45 - 117 U/L [address], MD  [facility] [place] [address] [address] Ankit Clement 7  [facility].  [phone]

## 2017-08-20 NOTE — PROGRESS NOTES
Noted PIV in right AC removed d/t infiltration. When disconnected from Venofer, flushed and no edema noted. About an hour later 2 inch area of edema and erythema noted on the medial epicondyl side of the right arm. IV removed and ice applied. New #22 placed in the right FA.     1515 Pt to be discharged home. Iv removed. I have reviewed discharge instructions with the patient. The patient verbalized understanding. Clarified pt is to take Propanolol 10 mg TID for 2 days and then to 20 mg BID. Pt verbalized understanding.  transported her home.

## 2017-08-20 NOTE — PROGRESS NOTES
Hospitalist Progress Note  Andre Jeter MD  Office: [phone]  Cell: [phone]      Date of Service:  2017  NAME:  Darrion Burr  :  1955  MRN:  [id number]      Admission Summary:     Darrion Burr is a 58 y.o. female with past medical history of cirrhosis, HCV who was admitted for  Low hemoglobin. Interval history / Subjective:       F/u for anemia. She is s/p 2 unit of PRBC transfusion 2 days ago  She has no complaints at this time. Feeling better today. She is s/p EGD which showed sites of prior banding with necrotic tissue seen in lower   Esophagus. Mild portal gastropathy of the body of the stomach. Assessment & Plan:     Blood loss anemia: This is likely secondary to chronic GI blood loss. Hb on admit was 6.5. She is s/p 2 unit of PRBC transfusion. Hb now 9.4 yesterday no labs ordered for today   Continue venofer 300 mg I.V daily for iron deficiency anemia total 3 doses    Liver Cirrhosis: Secondary to chronic hepatitis C virus. Meld score of 12. Continue step 1 diuretics. Hepatology following    Thrombocytopenia: Secondary to liver cirrhosis    Esophageal Varices s/p banding    Elevated blood pressure: Likely due to essential HTN  Stage 2. On metoprolol 25 mg BID. Controlled    HILARIA : 3 doses of Venofer Ferritin was < 10. Code status: Full  DVT prophylaxis:  SCD    Care Plan discussed with: Patient/Family and Nurse  Disposition: TBD, likely discharge tomorrow after last Venofer dose. Hospital Problems  Date Reviewed: 2017          Codes Class Noted POA    Symptomatic anemia ICD-10-CM: D64.9  ICD-9-CM: 285.9  2017 Unknown                Review of Systems:   Pertinent items are noted in HPI. Vital Signs:    Last 24hrs VS reviewed since prior progress note.  Most recent are:  Visit Vitals    /50 (BP 1 Location: Right arm, BP Patient Position: At rest;Sitting)    Pulse 79    Temp 98.8 °F (37.1 °C)    Resp 16    Ht 5' 4\" (1.626 m)    Wt 90.6 kg (199 lb 11.8 oz)    SpO2 97%    BMI 34.28 kg/m2         Intake/Output Summary (Last 24 hours) at 08/20/17 0853  Last data filed at 08/20/17 0511   Gross per 24 hour   Intake              625 ml   Output                0 ml   Net              625 ml        Physical Examination:             Constitutional:  No acute distress, cooperative, pleasant    ENT:  Oral mucous moist, oropharynx benign. Neck supple, +ve icteric   Resp:  CTA bilaterally. No wheezing/rhonchi/rales. No accessory muscle use   CV:  Regular rhythm, normal rate, no murmurs, gallops, rubs    GI:  Soft, non distended, non tender. normoactive bowel sounds, no hepatosplenomegaly     Musculoskeletal:  No edema, warm, 2+ pulses throughout    Neurologic:  Moves all extremities. AAOx3, CN II-XII reviewed            Data Review:        Labs:     Recent Labs      08/19/17   1111  08/18/17   1321   WBC  3.5*  2.7*   HGB  9.4*  8.6*   HCT  31.8*  29.1*   PLT  72*  72*     Recent Labs      08/18/17   1321  08/17/17   1554   NA  142  142   K  3.6  4.0   CL  110*  111*   CO2  24  22   BUN  9  12   CREA  0.56  0.51*   GLU  105*  104*   CA  8.4*  8.3*   MG  2.1   --    PHOS  3.8   --      Recent Labs      08/17/17   1554   SGOT  19   ALT  28   AP  105   TBILI  0.9   TP  7.1   ALB  3.1*   GLOB  4.0     No results for input(s): INR, PTP, APTT in the last 72 hours. No lab exists for component: INREXT, INREXT   Recent Labs      08/17/17   1729  08/17/17   1554   TIBC  519*  505*   PSAT  4*  5*   FERR  7*  7*      No results found for: FOL, RBCF   No results for input(s): PH, PCO2, PO2 in the last 72 hours. No results for input(s): CPK, CKNDX, TROIQ in the last 72 hours.     No lab exists for component: CPKMB  No results found for: CHOL, CHOLX, CHLST, CHOLV, HDL, LDL, LDLC, DLDLP, TGLX, TRIGL, TRIGP, CHHD, CHHDX  No results found for: [facility]  Lab Results   Component Value Date/Time    Color YELLOW 11/06/2016 12:23 PM    Appearance CLOUDY 11/06/2016 12:23 PM    Specific gravity 1.025 11/06/2016 12:23 PM    pH (UA) 6.0 11/06/2016 12:23 PM    Protein 30 11/06/2016 12:23 PM    Glucose NEGATIVE  11/06/2016 12:23 PM    Ketone TRACE 11/06/2016 12:23 PM    Urobilinogen 1.0 11/06/2016 12:23 PM    Nitrites NEGATIVE  11/06/2016 12:23 PM    Leukocyte Esterase SMALL 11/06/2016 12:23 PM    Epithelial cells FEW 11/06/2016 12:23 PM    Bacteria NEGATIVE  11/06/2016 12:23 PM    WBC 0-4 11/06/2016 12:23 PM    RBC >100 11/06/2016 12:23 PM         Medications Reviewed:     Current Facility-Administered Medications   Medication Dose Route Frequency    iron sucrose (VENOFER) 300 mg in 0.9% sodium chloride 250 mL IVPB  300 mg IntraVENous Q24H    sucralfate (CARAFATE) 100 mg/mL oral suspension 1 g  1 g Oral Q6H    metoprolol succinate (TOPROL-XL) XL tablet 25 mg  25 mg Oral DAILY    0.9% sodium chloride infusion 250 mL  250 mL IntraVENous PRN    hydrALAZINE (APRESOLINE) 20 mg/mL injection 10 mg  10 mg IntraVENous Q6H PRN    saline peripheral flush soln 5 mL  5 mL InterCATHeter PRN    SALINE PERIPHERAL FLUSH Q8H soln 5 mL  5 mL InterCATHeter Q8H    furosemide (LASIX) tablet 40 mg  40 mg Oral DAILY    spironolactone (ALDACTONE) tablet 100 mg  100 mg Oral DAILY    acetaminophen (TYLENOL) tablet 650 mg  650 mg Oral Q4H PRN    docusate sodium (COLACE) capsule 100 mg  100 mg Oral BID     ______________________________________________________________________  EXPECTED LENGTH OF STAY: - - -  ACTUAL LENGTH OF STAY:          2                 Frankie Crystal MD

## 2017-08-21 ENCOUNTER — PATIENT OUTREACH (OUTPATIENT)
Dept: FAMILY MEDICINE CLINIC | Age: 62
End: 2017-08-21

## 2017-08-21 ENCOUNTER — TELEPHONE (OUTPATIENT)
Dept: HEMATOLOGY | Age: 62
End: 2017-08-21

## 2017-08-21 LAB
ABO + RH BLD: NORMAL
ANTIGENS PRESENT BLD: NORMAL
ANTIGENS PRESENT RBC DONR: NORMAL
BLD PROD TYP BPU: NORMAL
BLOOD BANK CMNT PATIENT-IMP: NORMAL
BLOOD GROUP ANTIBODIES SERPL: NORMAL
BLOOD GROUP ANTIBODIES SERPL: NORMAL
BPU ID: NORMAL
CROSSMATCH RESULT,%XM: NORMAL
SPECIMEN EXP DATE BLD: NORMAL
STATUS OF UNIT,%ST: NORMAL
UNIT DIVISION, %UDIV: 0

## 2017-08-21 NOTE — Clinical Note
This patient was discharge 8/20/17. She needs an access now order for the GI follow up with Dr Frantz Alonzo and oncology [facility] Dr Janey Flor: She was admitted and was transfused with  packed RBCs. The patient was also found to be hypertensive and was  started on beta blocker. She received 1 dose of IV Venofer. EGD was done and Carafate was added to her regimen by Dr Shahrzad Bajwa. Her hemoglobin has been stable. Her last hemoglobin today was 8.5. She was seen by Dr. Best Coello, who cleared her for discharge. The  patient has no complaints and she says she will see Dr. Best Coello next  week. The patient will be given a prescription for iron sulfate to be  taken twice daily.  She is also started on Propanolol 10 mg 3 times daily for 2 days then increase 120 mg twice daily.   FOLLOWUP: She will need to follow up with Dr Frantz Alonzo next week, who will get  a CBC and send her to transfusion center for IV Iron.

## 2017-08-21 NOTE — PROGRESS NOTES
[address] Note      NN called Clifford Orellana and NN could hear talking but none would respond to NN. NN will attempt to call again. NN send chart to Brandon Frazier and Dr Mariel Nichole. This patient was discharge 8/20/17. She needs an access now order for the GI follow up with Dr Bev Simms and oncology [facility] Dr Marline Carballo.   1:24 pm   NN called again and gave spouse details of FU appointment at [facility] on August 24 at 1:30 pm with Brandon Frazier NP. Pratik, spouse, repeated correctly. Hospital Follow Up for in patient Admission from 8/17/17 - 8/20/17 for symptomatic anemia. Medical History:     Past Medical History:   Diagnosis Date    Arthritis 2014    Shoulders, Hips, Knees, Ankles, Back    Esophageal varices (HCC)     Liver disease 2017    Cirrohis    Neurological disorder        This represents Transitions of Care b/c NN spoke with patient and/or caregiver within 1 business days of discharge. Pt's TCM follow up appt is scheduled with Brandon Frazier NP on Thursday 8/24/17 1:30 pm which is within 4 days of discharge. Called spouse on 8/21/17 and verified with 2 identifiers. Patient presenting symptoms SOB, weakness. Course of current Hospitalization (referenced by note):   DIAGNOSIS ON ADMISSION: Anemia.     DIAGNOSIS ON DISCHARGE: Iron-deficiency anemia.     HISTORY OF PRESENT ILLNESS: The patient is a 60-year-old   female who has previous history significant for hepatitis C related   cirrhosis, thrombocytopenia, esophageal varices, status post banding   on 08/10/2017. She was found to have a hemoglobin of 6.1 at  [facility]. The   patient was symptomatic.      HOSPITAL COURSE: She was admitted and was transfused with   packed RBCs. The patient was also found to be hypertensive and was   started on beta blocker. She received 1 dose of IV Venofer. EGD was done and Carafate was added to her regimen by Dr Don Taylor. Her hemoglobin has been stable. Her last hemoglobin today was 8.5.    She was seen by Dr. Elin Blandon, who cleared her for discharge. The   patient has no complaints and she says she will see Dr. Mervat Valera next   week. The patient will be given a prescription for iron sulfate to be   taken twice daily. She is also started on Propanolol 10 mg 3 times daily for 2 days then increase 120 mg twice daily.     FOLLOWUP: She will need to follow up with Dr Anitha Dupree next week, who will get   a CBC and send her to transfusion center for IV Iron.             Diagnosed with Anemia. Admitted to Hospitalist Service with consults from [facility]. Chencho Grier LPN        ----- Message from Debbie Izquierdo MD sent at 8/21/2017  7:13 AM EDT -----  Needs 1 dose of IV FE Q week x 2. Also please work with  staff to get her appt with Valentina Bae Mc in 1 week so she can get HCV treatment  MLS            Significant Lab/Diagnostic Findings: HGB 6.1  Lab Results  Component Value Date/Time   WBC 2.4 08/20/2017 09:32 AM   HGB 8.5 08/20/2017 09:32 AM   Hemoglobin (POC) 6.1 08/17/2017 02:19 PM   HCT 29.0 08/20/2017 09:32 AM   PLATELET 71 50/32/7438 09:32 AM   MCV 85.3 08/20/2017 09:32 AM     Lab Results   Component Value Date/Time    INR 1.3 11/06/2016 02:21 PM    Prothrombin time 13.3 11/06/2016 02:21 PM      Lab Results   Component Value Date/Time    Sodium 141 08/20/2017 09:32 AM    Potassium 3.2 08/20/2017 09:32 AM    Chloride 108 08/20/2017 09:32 AM    CO2 23 08/20/2017 09:32 AM    Anion gap 10 08/20/2017 09:32 AM    Glucose 182 08/20/2017 09:32 AM    BUN 17 08/20/2017 09:32 AM    Creatinine 0.71 08/20/2017 09:32 AM    BUN/Creatinine ratio 24 08/20/2017 09:32 AM    GFR est AA >60 08/20/2017 09:32 AM    GFR est non-AA >60 08/20/2017 09:32 AM    Calcium 8.1 08/20/2017 09:32 AM    Bilirubin, total 0.9 08/17/2017 03:54 PM    ALT (SGPT) 28 08/17/2017 03:54 PM    AST (SGOT) 19 08/17/2017 03:54 PM    Alk.  phosphatase 105 08/17/2017 03:54 PM    Protein, total 7.1 08/17/2017 03:54 PM    Albumin 3.1 08/17/2017 03:54 PM    Globulin 4.0 08/17/2017 03:54 PM    A-G Ratio 0.8 08/17/2017 03:54 PM                Medication Reconciliation completed: y New medications at discharge include 3  Prescription Medication total: 8 (pharmacy consult for polypharm needed?) no     If multiple admissions, ED visits, check and reviewed :  no    Barriers to care? No insurance self pay     Support System consists of: spouse    Previous Use of [address], if so what agency? no     Advance Medical Directive on file in EMR? no has NO advanced directive - not interested in additional information    Future Plan for Patient to include communication plan: Adherence to previous treatment and likelihood for f/u: good. Past Hospitalizations/ED visits last 12 months? has been hospitalized once 1/ 1 known ER visits in last 12 months. Goals Addressed             Most Recent     Knowledge and adherence of prescribed medication (ie. action, side effects, missed dose, etc.). Inderal titrate as directed, Ferous Sulfate and Fe IV at infusion center. IM       relationship with PCP   Improving (8/21/2017)             Nicole Khalil will keep FU appointment.  IM

## 2017-08-21 NOTE — TELEPHONE ENCOUNTER
----- Message from Shyann Menjivar MD sent at 8/21/2017  7:13 AM EDT -----  Needs 1 dose of IV FE Q week x 2.   Also please work with  staff to get her appt with April or Jose Rafael Ordoñez in 1 week so she can get HCV treatment  MLS

## 2017-08-21 NOTE — TELEPHONE ENCOUNTER
Spoke with patient. Informed her she has a follow up appointment on 8/28/17 with April to begin treatment for HCV. Informed patient I sent an order to the [facility] for [facility]. Educated patient about Injectafer. Informed patient if she does not hear from the [facility] within 2 days to call the office back. Patient repeated back instructions.

## 2017-08-21 NOTE — DISCHARGE SUMMARY
[address] [address], [address] [place] [address] SUMMARY       Name:  Guillaume Diamond   MR#:  [id number]   :  1955   Account #:  [de-identified]        Date of Adm:  2017       DIAGNOSIS ON ADMISSION: Anemia. DIAGNOSIS ON DISCHARGE: Iron-deficiency anemia. HISTORY OF PRESENT ILLNESS: The patient is a 80-year-old   female who has previous history significant for hepatitis C related   cirrhosis, thrombocytopenia, esophageal varices, status post banding   on 08/10/2017. She was found to have a hemoglobin of 6.1 at  [facility]. The   patient was symptomatic. HOSPITAL COURSE: She was admitted and was transfused with   packed RBCs. The patient was also found to be hypertensive and was   started on beta blocker. She received 1 dose of IV Venofer. EGD was done and Carafate was added to her regimen by Dr Carolina Aguilar. Her hemoglobin has been stable. Her last hemoglobin today was 8.5. She was seen by Dr. Elyssa Glez, who cleared her for discharge. The   patient has no complaints and she says she will see Dr. Elyssa Glez next   week. The patient will be given a prescription for iron sulfate to be   taken twice daily. She is also started on Propanolol 10 mg 3 times daily for 2 days then increase 120 mg twice daily. FOLLOWUP: She will need to follow up with Dr Skyla Bryan next week, who will get   a CBC and send her to transfusion center for IV Iron.         Faiza Mims MD      [facility]   D:  2017   14:50   T:  2017   06:06   Job #:  [id number]

## 2017-08-22 ENCOUNTER — TELEPHONE (OUTPATIENT)
Dept: HEMATOLOGY | Age: 62
End: 2017-08-22

## 2017-08-22 NOTE — TELEPHONE ENCOUNTER
Patient called asking for follow up appointment date and time, which was given to her. Patient asked for education again on Injectafer. Patient repeated back education given.

## 2017-08-23 ENCOUNTER — TELEPHONE (OUTPATIENT)
Dept: FAMILY MEDICINE CLINIC | Age: 62
End: 2017-08-23

## 2017-08-23 NOTE — TELEPHONE ENCOUNTER
Chart review. Has appt at [facility] tomorrow, seems she may already have appt for IV iron and it is unclear if she needs to see heme-onc. Has f/u with Dr. Aleyda Gallagher of Hepatology. shjould not be on both metoprolol and propanolol. [facility] provider needs to make sure the post-menopausal bleed resolved. Most likely blood loss was only from esophagitis.

## 2017-08-24 ENCOUNTER — OFFICE VISIT (OUTPATIENT)
Dept: FAMILY MEDICINE CLINIC | Age: 62
End: 2017-08-24

## 2017-08-24 VITALS
SYSTOLIC BLOOD PRESSURE: 148 MMHG | TEMPERATURE: 98.4 F | HEART RATE: 60 BPM | WEIGHT: 199 LBS | DIASTOLIC BLOOD PRESSURE: 52 MMHG | BODY MASS INDEX: 34.16 KG/M2

## 2017-08-24 DIAGNOSIS — D64.9 SYMPTOMATIC ANEMIA: Primary | ICD-10-CM

## 2017-08-24 DIAGNOSIS — K76.6 PORTAL HYPERTENSION (HCC): ICD-10-CM

## 2017-08-24 DIAGNOSIS — B18.2 CHRONIC HEPATITIS C WITHOUT HEPATIC COMA (HCC): ICD-10-CM

## 2017-08-24 PROBLEM — I10 ESSENTIAL HYPERTENSION: Status: ACTIVE | Noted: 2017-08-24

## 2017-08-24 PROBLEM — I10 ESSENTIAL HYPERTENSION: Status: RESOLVED | Noted: 2017-08-24 | Resolved: 2017-08-24

## 2017-08-24 LAB — HGB BLD-MCNC: 9 G/DL

## 2017-08-24 RX ORDER — LISINOPRIL 20 MG/1
20 TABLET ORAL DAILY
Qty: 30 TAB | Refills: 3 | Status: SHIPPED | OUTPATIENT
Start: 2017-08-24 | End: 2017-11-20 | Stop reason: SDUPTHER

## 2017-08-24 NOTE — PATIENT INSTRUCTIONS
Lisinopril (Por la boca)   Trata la hipertensión y la insuficiencia cardíaca. También se administra para reducir el riesgo de muerte después de un ataque cardíaco. Marian medicamento es un inhibidor de la enzima convertidora de la angiotensina (ECA). Conchis(s) : Prinivil, Qbrelis, Zestril   Existen muchas otras marcas de Due[place]as. Marian medicamento no debe ser usado cuando:   Marian medicamento no es adecuado para todas las personas. No lo use si usted ha tenido lam reacción alérgica al lisinopril o a otro medicamento inhibidor ECA, o si usted [place] [employer]. Forma de usar marian medicamento:   Líquido, Tableta  · Ganado justus medicamentos ofelia se le haya indicado. Es probable que sea necesario cambiar tam dosis varias veces hasta encontrar la que funciona mejor para usted. · Suspensión oral: Mida el líquido oral con Deandre Michael, [place] para uso oral o taza especialmente marcadas para medir medicamentos. · Si olvida lam dosis: Si olvida lma dosis de tam medicamento, tómelo lo más pronto posible. Si es jose la hora para tam próxima dosis, espere hasta entonces para monika tam dosis regular. No use medicamento adicional para reponer la dosis olvidada. · Guarde el medicamento en un recipiente cerrado a temperatura ambiente y alejado del calor, la humedad y la john directa. Medicamentos y [employer] que debe evitar:   Consulte con tam médico o farmacéutico antes de usar cualquier medicamento, incluyendo los que compra sin receta médica, las vitaminas y los productos herbales. · No use marian medicamento junto con aliskiren si usted tiene diabetes. · Algunos alimentos y medicamentos pueden afectar el funcionamiento de lisinopril.  Informe a tam médico si está usando cualquiera de los siguientes:   ¨ Aliskiren, everolimus, litio, sirolimus, temsirolimus  ¨ Otro medicamento para la presión arterial, incluyendo un bloqueador del receptor de la angiotensina (BRA)  ¨ Diurético (incluyendo amilorida, espironolactona, triamtereno)  ¨ Insulina o un medicamento para la diabetes  ¨ Medicamentos analgésicos antiinflamatorios no esteroides, o MARA, para el dolor o la artritis (incluye aspirina, celecoxib, diclofenac, ibuprofeno, naproxeno)  · Consulte con tam médico antes de usar cualquier medicamento, suplemento o sustituto de la sal que contenga potasio. Precauciones wesly el uso de marian medicamento:   · No es seguro monika marian medicamento wesly el [facility]. Podría dañar al feto. Dígale a tam médico de inmediato si usted [place] · Infórmele a tam médico si usted está dando de lactar, tiene problemas renales o hepáticos, diabetes, o enfermedad del corazón o de los vasos sanguíneos. · Marian medicamento puede provocarle los siguientes problemas:  ¨ Angioedema (inflamación severa)  ¨ Problemas renales  ¨ Problemas graves del hígado  · Marian medicamento puede bajarle demasiado tam presión arterial, especialmente cuando lo use por primera vez o si usted sufre lam deshidratación. Si se siente desvanecer o mareado póngase de pie o siéntese lentamente. · Aunque se sienta mejor, no suspenda el uso de marian medicamento sin antes consultar con tam médico. Marian medicamento no curará tam presión arterial lucas, jaime sí le ayudará a mantenerla en un rango normal. Es probable que necesite monika medicamento para la presión arterial por el sona de tam clyde. · Dígale a todo médico o dentista encargado de atenderle que usted está usando [facility]. · El médico solicitará exámenes de laboratorio wesly las citas de rutina para revisar los efectos de [facility]. Asista a todas justus citas. · Guarde todos los medicamentos fuera del alcance de los niños. Nunca comparta justus medicamentos con [facility].   Efectos secundarios que pueden presentarse wesly el uso de marian medicamento:   Consulte inmediatamente con el médico si nota cualquiera de estos efectos secundarios:  · Reacción alérgica: Comezón o ronchas, hinchazón del kale o las em, hinchazón u hormigueo en la boca o garganta, opresión en el pecho, dificultad para respirar  · Ampollas, despelleje, o sarpullido durand en la piel  · Cambio en la cantidad y frecuencia con la que orina  · Confusión, debilidad, ritmo cardíaco irregular, dificultad para respirar, entumecimiento u hormigueo en justus em, pies o labios  · Orina oscura o heces pálidas, náuseas, vómitos, falta de apetito, dolor estomacal, coloración amarillenta en la piel u ojos  · Gagandeep, escalofríos, dolor de garganta, nancy de cuerpo  · Desvanecimientos, mareos, desmayos  · Dolor de estómago intenso (con o sin náuseas o vómito)  Consulte con el médico si nota los siguientes efectos secundarios menos graves:   · Tos seca  Consulte con el médico si nota otros efectos secundarios que vi son causados por tameka medicamento. Llame a tam médico para consultarle Debbie. Usted puede notificar justus efectos secundarios al FDA al 8-653-SYW-5271. © 2017 [address]  Information is for End User's use only and may not be sold, redistributed or otherwise used for commercial purposes. Esta información es sólo para uso en educación. Tam intención no es darle un consejo médico sobre enfermedades o tratamientos. Colsulte con tam Evelyn Matte farmacéutico antes de seguir cualquier régimen médico para saber si es seguro y efectivo para usted.

## 2017-08-24 NOTE — PROGRESS NOTES
Subjective:     Chief Complaint   Patient presents with   [facility] Follow Up        She  is a 58 y.o. female who presents for evaluation of post hospital.     Notes significant fatigue after starting BB for HTN control. Denies any new CP, dizziness nor SOB. Denies any acute blood via vomiting, stool nor vaginal.     Notes prior lisinopril therapy well tolerated, prior concerns r/t swelling   But wasn't taken other medications/diuretics at the time. Pt expresses concern r/t ability to keep hepatology appt since her  has missed a lot of work  R/t hospitalization and she denies have any friends/family who can drive her from her home in [place]  To her appt. Taxi/[employer] is cost prohibitive. Appetite is improved. ROS  Gen - no fever/chills  Resp - no dyspnea or cough  CV - no chest pain or CUENCA  Rest per HPI    Past Medical History:   Diagnosis Date    Arthritis     Shoulders, Hips, Knees, Ankles, Back    Esophageal varices ([facility] [address]     Liver disease     Cirrohis    Neurological disorder      Past Surgical History:   Procedure Laterality Date    HX CHOLECYSTECTOMY      HX GI      upper GI    HX GYN           Current Outpatient Prescriptions on File Prior to Visit   Medication Sig Dispense Refill    metoprolol succinate (TOPROL-XL) 25 mg XL tablet Take 1 Tab by mouth daily. Indications: hypertension 30 Tab 0    sucralfate (CARAFATE) 100 mg/mL suspension Take 10 mL by mouth every six (6) hours. 100 mL 0    ferrous sulfate 325 mg (65 mg iron) tablet Take 1 Tab by mouth two (2) times a day. 60 Tab 0    propranolol (INDERAL) 10 mg tablet Take 1 Tab by mouth three (3) times daily. 90 Tab 0    propranolol (INDERAL) 20 mg tablet Take 1 Tab by mouth two (2) times a day. 60 Tab 0    propranolol (INDERAL) 20 mg tablet Take 1 Tab by mouth two (2) times a day. 60 Tab 0    propranolol (INDERAL) 20 mg tablet Take 1 Tab by mouth two (2) times a day.  60 Tab 0    furosemide (LASIX) 40 mg tablet Take 1 Tab by mouth daily. 90 Tab 3    spironolactone (ALDACTONE) 100 mg tablet Take 1 Tab by mouth daily. 90 Tab 3     No current facility-administered medications on file prior to visit. Objective:     Vitals:    08/24/17 1352   BP: 148/52   Pulse: 60   Temp: 98.4 °F (36.9 °C)   TempSrc: Oral   Weight: 199 lb (90.3 kg)       Physical Examination:  General appearance - alert, well appearing, and in no distress  Eyes -sclera anicteric  Neck - supple, no significant adenopathy, no thyromegaly  Chest - clear to auscultation, no wheezes, rales or rhonchi, symmetric air entry  Heart - normal rate, regular rhythm, normal S1, S2, no murmurs, rubs, clicks or gallops  Neurological - alert, oriented, no focal findings or movement disorder noted    Neg edema in low extremities. Assessment/ Plan:   Diagnoses and all orders for this visit:    1. Symptomatic anemia  -     AMB POC HEMOGLOBIN (HGB)    2. Portal hypertension (HCC)  -     lisinopril (PRINIVIL, ZESTRIL) 20 mg tablet; Take 1 Tab by mouth daily. 3. Chronic hepatitis C without hepatic coma (HCC)      POC Hgb stable compared to LOV/Hospital labs. Stop BB r/t duplicate therapy and SE of fatigue. Start Lisinopril. Renal function and K+ borderline low despite diuretic/aldactone therapy. Encouraged Pt to call liver institute to discuss her appt with them. Strongly encouraged Pt to keep appt or reschedule ASAP. Re-eval BP in 4-6 weeks. I have discussed the diagnosis with the patient and the intended plan as seen in the above orders. The patient has received an after-visit summary and questions were answered concerning future plans. I have discussed medication side effects and warnings with the patient as well. The patient verbalizes understanding and agreement with the plan. Follow-up Disposition:  Return in about 6 weeks (around 10/5/2017).

## 2017-08-24 NOTE — PROGRESS NOTES
Results for orders placed or performed in visit on 08/24/17   AMB POC HEMOGLOBIN (HGB)   Result Value Ref Range    Hemoglobin (POC) 9      Coordination of Care  1. Have you been to the ER, urgent care clinic since your last visit? Hospitalized since your last visit? Yes When: [facility]     2. Have you seen or consulted any other health care providers outside of the [address] [place] [address] since your last visit? Include any pap smears or colon screening. No    Medications  Medication Reconciliation Performed: no  Patient does not need refills     Learning Assessment Complete?  yes

## 2017-08-24 NOTE — PROGRESS NOTES
Patient seen for discharge and no  needed per the patient. We reviewed the AVS, prescription and pharmacy location. She will go now to registration to schedule a 4-6 week provider follow-up appointment. She prefers Friday afternoons if possible.  Andreia Jenkins RN

## 2017-09-11 ENCOUNTER — HOSPITAL ENCOUNTER (OUTPATIENT)
Dept: INFUSION THERAPY | Age: 62
Discharge: HOME | End: 2017-09-11

## 2017-09-11 RX ORDER — SODIUM CHLORIDE 9 MG/ML
25 INJECTION, SOLUTION INTRAVENOUS CONTINUOUS
Status: DISPENSED | OUTPATIENT
Start: 2017-09-11 | End: 2017-09-11

## 2017-09-12 ENCOUNTER — PATIENT OUTREACH (OUTPATIENT)
Dept: FAMILY MEDICINE CLINIC | Age: 62
End: 2017-09-12

## 2017-09-12 NOTE — LETTER
10/2/2017 7:47 AM 
 
Ms. Yash Lopez [address] [address] Soy lam Devin Benitez y me gustaria hablar con usted para sabre ofelia estas y si me necesitas para continuar con tam cuidado medico. Por favor de llamar me. [phone] WELLINGTON Duffy RN, CMSRN  Nurse Navigator, Respecting Choices® [facility] Facilitator [address] [facility] [address] [address], [place]/Xavier Fin[address] Final 
[phone] (w)  [phone]. (c)  [phone] (f) [email or web address]  [email or web address]

## 2017-09-12 NOTE — Clinical Note
Liver institute is recommending Hep B vaccine for this patient. Vaccination for viral hepatitis B is recommended since the patient has no serologic evidence of previous exposure or vaccination with immunity. Vaccination for viral hepatitis A is not needed. The patient has serologic evidence of prior exposure or vaccination with immunity. VIDHYA Harris at [facility], [phone].

## 2017-09-12 NOTE — PROGRESS NOTES
[address] GOPAL JOSE Note. 9/12/17  Left message. 10/2/17  Left message. DAMON appt. with Dr Villegas August October 3, 2017. NN sent letter.

## 2017-09-18 ENCOUNTER — HOSPITAL ENCOUNTER (OUTPATIENT)
Dept: INFUSION THERAPY | Age: 62
Discharge: HOME | End: 2017-09-18

## 2017-09-29 ENCOUNTER — OFFICE VISIT (OUTPATIENT)
Dept: HEMATOLOGY | Age: 62
End: 2017-09-29

## 2017-09-29 VITALS
DIASTOLIC BLOOD PRESSURE: 66 MMHG | HEART RATE: 62 BPM | SYSTOLIC BLOOD PRESSURE: 193 MMHG | WEIGHT: 208 LBS | BODY MASS INDEX: 35.7 KG/M2 | TEMPERATURE: 98 F | OXYGEN SATURATION: 97 %

## 2017-09-29 DIAGNOSIS — K74.60 CIRRHOSIS OF LIVER WITHOUT ASCITES, UNSPECIFIED HEPATIC CIRRHOSIS TYPE (HCC): Primary | ICD-10-CM

## 2017-09-29 NOTE — MR AVS SNAPSHOT
Visit Information Papito Chen Personal Médico Departamento Teléfono del Dep. Número de visita 9/29/2017  1:00 PM Kel Angeles, [address] of [place] [address] [phone] Your Appointments 10/3/2017  2:00 PM  
Follow Up with Chiquita Huddleston MD  
[facility] [address] ([address]) Appt Note: Follow up per SO; by mcn; had conflict on 7/25  
 [address] [place]  
[phone]  
  
   
 [address] [address]  
  
    
 12/21/2017  1:30 PM  
PROCEDURE with Lisa Vizcaino MD  
[address] ([address]) Appt Note: EGD  
 [address] [facility] El[place] [phone] [facility] [address]  
[address] [place] [address] Upcoming Health Maintenance Date Due Pneumococcal 19-64 Highest Risk (1 of 3 - PCV13) 3/5/1974 DTaP/Tdap/Td series (1 - Tdap) 3/5/1976 PAP AKA CERVICAL CYTOLOGY 3/5/1976 BREAST CANCER SCRN MAMMOGRAM 3/5/2005 ZOSTER VACCINE AGE 60> 1/5/2015 INFLUENZA AGE 9 TO ADULT 8/1/2017 FOBT Q 1 YEAR AGE 50-75 8/17/2018 Alergias  Review Complete El: 9/29/2017 Por: Dora Powell A partir del:  9/29/2017 Intensidad Anotado Tipo de reacción [facility] Financial Morphine  03/11/2011    Nausea and Vomiting, Other (comments) Histamine release? Red streak on arm after IV injection Vacunas actuales Revisadas el:  8/20/2017 [place] Pac Influenza Vaccine (Quad) PF 10/20/2016 No revisadas esta visita You Were Diagnosed With   
  
 Олег Clipper Cirrhosis of liver without ascites, unspecified hepatic cirrhosis type ([facility]    -  Primary ICD-10-CM: K74.60 ICD-9-CM: 571.5 Partes vitales PS Pulso Temperatura Peso (percentil de crecimiento) SpO2 BMI (IMC)  
 193/66 62 98 °F (36.7 °C) (Tympanic) 208 lb (94.3 kg) 97% 35.7 kg/m2 Estado obstétrico Estatus de tabaquísmo Postmenopausal Former Smoker Historial de signos vitales BMI and BSA Data Body Mass Index Body Surface Area 35.7 kg/m 2 2.06 m 2 Rahellizkinsey [facility] Name Phone [phone] [place], [address] [facility] [phone] Hinojosa lista de medicamentos actualizada Lista actualizada el: 9/29/17  2:01 PM.  Linda Bernabe use hinojosa lista de medicamentos más reciente. ferrous sulfate 325 mg (65 mg iron) tablet Take 1 Tab by mouth two (2) times a day. furosemide 40 mg tablet También conocido ofelia:  LASIX Take 1 Tab by mouth daily. lisinopril 20 mg tablet También conocido ofelia:  Shellie Drought Take 1 Tab by mouth daily. spironolactone 100 mg tablet También conocido ofelia:  ALDACTONE Take 1 Tab by mouth daily. sucralfate 100 mg/mL suspension También conocido ofelia:  Leitha Zuleyma Take 10 mL by mouth every six (6) hours. Hicimos lo siguiente AFP WITH AFP-L3% [[id number] Custom] CBC W/O DIFF [38306 CPT(R)] HEPATIC FUNCTION PANEL (6) [[id number] Custom] IRON PROFILE [id number] CPT(R)] METABOLIC PANEL, BASIC [81630 CPT(R)] PROTHROMBIN TIME + INR [86959 CPT(R)] UPPER GI ENDOSCOPY,DIAGNOSIS [10758 CPT(R)] Comentarios:  
 Schedule with Dr. Redmond Minder VITAMIN B12 & FOLATE [18448 CPT(R)] Por hacer 10/06/2017 Imaging:  US ABD COMP Introducing [facility]! [facility] introduce portal paciente MyChart . Ahora se puede acceder a partes de hinojosa expediente médico, enviar por correo electrónico la oficina de hinojosa médico y solicitar renovaciones de medicamentos en línea. En hinojosa navegador de Internet , Issac Sheni a [email or web address] Sheeba clic en el usuario por Germania Comber? Gabriela Halon clic aquí en la sesión Loanne . Verá la página de registro [place]. Ingrese tam código de [employer] magalys y ofelia aparece a continuación. Usted no tendrá que [employer] código después de sukhdeep completado el proceso de registro . Si usted no se inscribe antes de la fecha de caducidad , debe solicitar un nuevo código. · MyChart Código de acceso : [id number] Expires: 11/8/2017  2:38 PM 
 
Ingresa los últimos cuatro dígitos de tam Número de Seguro Social ( xxxx ) y fecha de nacimiento ( dd / mm / aaaa ) ofelia se indica y sheeba clic en Enviar. Usted será llevado a la siguiente página de registro . Crear un ID MyChart . Esta será tam ID de inicio de sesión de MyChart y no puede ser [place] , por lo que pensar en lam que es Gemma Grills y fácil de recordar . Crear lam contraseña MyChart . Usted puede cambiar tam contraseña en cualquier momento . Ingrese tam Password Reset de preguntas y [place] . [place] se puede utilizar en un momento posterior si usted olvida tam contraseña. Introduzca tam dirección de correo electrónico . Dearl Davie recibirá lam notificación por correo electrónico cuando la nueva información está disponible en MyChart . Creed Hipps clic en Registrarse. Viaaarone Kristoefr víctor y descargar porciones de tam expediente médico. 
Sheeba clic en el enlace de descarga del menú Resumen para descargar lam copia portátil de tam información médica . Si tiene [employer] , por favor visite la sección de preguntas frecuentes del sitio web MyChart . Recuerde, MyChart NO es que se utilizará para las necesidades urgentes. Para emergencias médicas , llame al 911 . Ahora disponible en tam iPhone y Android ! Por favor proporcione tameka resumen de la documentación de cuidado a tam próximo proveedor. Your primary care clinician is listed as Angella Alonso. If you have any questions after today's visit, please call [phone].

## 2017-09-29 NOTE — PROGRESS NOTES
[address] Ana Luisa Rangel MD, JAMIL Garvin PA-C Palmer Scriver, MD, MD Tanya Alberto NP Lorenso Friend, JAMIL        [address] [facility], [address] [address]     [phone]     FAX: [address] [place] [address], [address] [place] [address], [address]     [phone]     FAX: [phone]         Patient Care Team:  Leslie Rudolph MD as PCP - General (Family Practice)  Praveen Chaves RN as Ambulatory Care Navigator      Problem List  Date Reviewed: 9/12/2017          Codes Class Noted    Portal hypertension ([facility] ICD-10-CM: K76.6  ICD-9-CM: 572.3  8/24/2017        Secondary esophageal varices without bleeding ([facility] ICD-10-CM: I85.10  ICD-9-CM: 456.21  8/17/2017        Symptomatic anemia ICD-10-CM: D64.9  ICD-9-CM: 285.9  8/17/2017        Cirrhosis ([facility] ICD-10-CM: K74.60  ICD-9-CM: 571.5  4/10/2017        Ascites ICD-10-CM: R18.8  ICD-9-CM: 789.59  4/10/2017        Chronic hepatitis C ([facility] ICD-10-CM: B18.2  ICD-9-CM: 070.54  1/2/2017        Obesity (BMI 30-39. 9) ICD-10-CM: E66.9  ICD-9-CM: 278.00  10/26/2016        Post-traumatic osteoarthritis of left hip ICD-10-CM: M16.52  ICD-9-CM: 715.25  10/26/2016        Primary osteoarthritis of right knee ICD-10-CM: M17.11  ICD-9-CM: 715.16  10/26/2016              Maranda Dominguez returns to the [place] [address] for management of cirrhosis secondary to chronic HCV. The active problem list, all pertinent past medical history, medications,   endoscopic studies, and laboratory findings related to the liver disorder were reviewed with the patient. The patient is a 58 y.o.  female who was first found to have chronic HCV and cirrhosis in 2/2017 when she was hospitalized at [facility] for ascites.   Risk factors for HCV are blood transfusions as a child in [facility]. An assessment of liver fibrosis with biopsy or elastography has not been performed. Ascites has resolved with diuretics, step 1, and past paracentesis. The patient has not developed edema. The patient has not developed overt hepatic encephalopathy. She has had some mild memory issues. The patient underwent EGD for assessment of varices in 8/2017 with Dr Lela Lindquist and 10 bands were placed. Over the course of the next week, she had a sharp drop in her hemoglobin associated with ulceration at the site of the banding and presented to the hospital with a hemoglobin in the 6-7 range. She was managed with blood products, iron infusion, repeat EGD ( no additional bands placed), and carafate. The most recent laboratory studies indicate that the liver transaminases are normal, ALP is normal, tests of hepatic synthetic and metabolic function are normal, and the platelet count is depressed. The patient notes fatigue, she has had no additional signs of GI blood losses. The patient has limitations in functional activities secondary to these symptoms. The patient has not experienced problems concentrating, swelling of the abdomen, swelling of the lower extremities, hematemesis, hematochezia. ALLERGIES  Allergies   Allergen Reactions    Morphine Nausea and Vomiting and Other (comments)     Histamine release? Red streak on arm after IV injection       MEDICATIONS  Current Outpatient Prescriptions   Medication Sig    lisinopril (PRINIVIL, ZESTRIL) 20 mg tablet Take 1 Tab by mouth daily.  sucralfate (CARAFATE) 100 mg/mL suspension Take 10 mL by mouth every six (6) hours.  ferrous sulfate 325 mg (65 mg iron) tablet Take 1 Tab by mouth two (2) times a day.  furosemide (LASIX) 40 mg tablet Take 1 Tab by mouth daily.  spironolactone (ALDACTONE) 100 mg tablet Take 1 Tab by mouth daily. No current facility-administered medications for this visit.         SYSTEM REVIEW NOT RELATED TO LIVER DISEASE OR REVIEWED ABOVE:  Constitution systems: Negative for fever, chills, weight gain, weight loss. Eyes: Negative for visual changes. ENT: Negative for sore throat, painful swallowing. Respiratory: Negative for cough, hemoptysis, SOB. Cardiology: Negative for chest pain, palpitations. GI:  Negative for constipation or diarrhea. : Negative for urinary frequency, dysuria, hematuria, nocturia. Skin: Negative for rash. Hematology: Negative for easy bruising, blood clots. Musculo-skeletal: Negative for back pain, muscle pain, weakness. Neurologic: Negative for headaches, dizziness, vertigo, memory problems not related to HE. Psychology: Negative for anxiety, depression. FAMILY HISTORY:  The father  of accident. The patient has no knowledge of the mother's medical condition. There is no family history of liver disease. SOCIAL HISTORY:  The patient is . The patient has 1 child. The patient has never used tobacco products. The patient has never consumed significant amounts of alcohol. The patient used to work CNA. The patient has not worked since . PHYSICAL EXAMINATION:  /66  Pulse 62  Temp 98 °F (36.7 °C) (Tympanic)   Wt 208 lb (94.3 kg)  SpO2 97%  BMI 35.7 kg/m2  General: No acute distress. Eyes: Sclera anicteric. ENT: No oral lesions. Thyroid normal.  Nodes: No adenopathy. Skin: No spider angiomata. No jaundice. No palmar erythema. Respiratory: Lungs clear to auscultation. Cardiovascular: Regular heart rate. No murmurs. No JVD. Abdomen: Soft non-tender. Liver size normal to percussion/palpation. Spleen not palpable. No obvious ascites. Extremities: No edema. No muscle wasting. No gross arthritic changes. Neurologic: Alert and oriented. Cranial nerves grossly intact. No asterixis.     LABORATORY STUDIES:  Liver [place] of [address] [place] [address] & Units 2017 2017 4/10/2017   WBC 3.6 - 11.0 K/uL 2.2 (L) 3.3 (L) 2.7 (L)   ANC 1.8 - 8.0 K/UL 1.5 (L) 2.1 1.6   HGB 11.5 - 16.0 g/dL 6.5 (L) 9.2 (L) 8.5 (L)   HGB  6.1 8.8    HGB (iSTAT)  6.1 8.8     - 400 K/uL 63 (L) 72 (L) 74 (LL)   INR 0.9 - 1.1        AST 15 - 37 U/L 19 25 29   ALT 12 - 78 U/L 28 35 30   Alk Phos 45 - 117 U/L 105 118 (H) 116   Bili, Total 0.2 - 1.0 MG/DL 0.9 0.8 0.5   Bili, Direct 0.00 - 0.40 mg/dL   0.22   Albumin 3.5 - 5.0 g/dL 3.1 (L) 3.4 (L) 3.4 (L)   BUN 6 - 20 MG/DL 12 14 16   Creat 0.55 - 1.02 MG/DL 0.51 (L) 0.59 0.55 (L)   Na 136 - 145 mmol/L 142 143 141   K 3.5 - 5.1 mmol/L 4.0 4.4 4.8   Cl 97 - 108 mmol/L 111 (H) 113 (H) 111 (H)   CO2 21 - 32 mmol/L 22 22 16 (L)   Glucose 65 - 100 mg/dL 104 (H) 96 124 (H)   Magnesium 1.6 - 2.4 mg/dL      Additional lab values drawn at today's office visit are pending at the time of documentation. SEROLOGIES:  11/2016. HCV Genotype 2  Serologies Latest Ref Rng & Units 4/10/2017   Hep A Ab, Total Negative Positive (A)   Hep B Surface Ag Negative Negative   Hep B Core Ab, Total Negative Negative   Hep B Surface AB QL  Non Reactive   HCV RT-PCR, Quant IU/mL [id number]     LIVER HISTOLOGY:  Not available or performed    ENDOSCOPIC PROCEDURES:  8/2017. EGD performed by Dr Ann-Marie Wallis. Multiple large esophageal varices. Banding performed x 10. No gastric varices. Moderate portal gastropathy. 8/2017. EGD performed by Dr Ann-Marie Wallis. Sites of prior banding visualized. Necrotic tissue seem in lower esophagus. This is most likely necrosing varices following banding. No gastric varices. Mild portal gastropathy. RADIOLOGY:  11/2016. CT scan abdomen with IV contrast.  Changes consistent with cirrhosis. No liver mass lesions. No dilated bile ducts. Moderate ascites. OTHER TESTING:  Not available or performed    ASSESSMENT AND PLAN:  Cirrhosis secondary to chronic HCV. Have performed laboratory testing to monitor liver function and degree of liver injury.   This included BMP, hepatic panel, CBC with platelet count, INR, and AFP. Liver transaminases are normal.  Alkaline phosphatase is normal.  Liver function is normal.  The platelet count is depressed. The CTP is 6. Child class A. The MELD score is 12. This will be recalculated on the basis of today's labs. The patient does not require liver transplant evaluation at this time. Ascites has resolved with current dose of diuretics. Will continue current dose of step 1 diuretics. Lower extremity edema has resolved with current dose of diuretics. Esophageal varices have been aggressively banded and she had bleeding from ulceration at the site of the banding. She has had no additional bleeding since discharge one month ago. This will need to be monitored and I have ordered repeat EGD for the near future. Anemia. Patient will be reassessed today again and if indicated, I will arrange for IV iron or transfusion. Clinically, she has reported improvement in energy. Hepatic encephalopathy has not developed to date. There is no need for treatment with lactulose and/or Xifaxan at this time. No need to restrict dietary protein at this time. I have discussed with her the need for regular bowel output however. The patient has not previously been treated for HCV. The patient has HCV genotype 2. We have discussed the treatment alternatives. The SVR/cure rate for HCV now exceeds 90% with just oral anti-viral therapy and no interferon injections or significant side effects for most patients with HCV. The specific treatment is dependent upon genotype, viral load and histology. The patient should be treated with Epclusa (sofosbuvir and valpitasvir) for 12 weeks. I do not think that Ribavirin is needed in this case. The SVR/cure rate for is over 99%. I will order this for her after review of her current lab studies.   As she has no insurance, she will need assistance from the 's patient assistance program.  Application and explanation given to patient. The patient was directed to continue all current medications at the current dosages. There are no contraindications for the patient to take any medications that are necessary for treatment of other medical issues. The patient was counseled regarding alcohol consumption. Thrombocytopenia secondary to cirrhosis. There is no evidence of overt bleeding. There is no indication for platelet transfusions or pharmacologic treatment to increase the platelet count. Neutropenia secondary to cirrhosis. There is no evidence of infection. There is no indication for GCSF at this time. Bone density to assess for osteoporosis has not been performed. Vaccination for viral hepatitis B is recommended since the patient has no serologic evidence of previous exposure or vaccination with immunity. Vaccination for viral hepatitis A is not needed. The patient has serologic evidence of prior exposure or vaccination with immunity. [facility]. screening will need to continue to be performed. I have drawn an AFP and have ordered US of the abdomen in the meantime. All of the above issues were discussed with the patient. All questions were answered. The patient expressed a clear understanding of the above. [facility] in 3 months, or within one month of initiation of HCV treatment once medications can be obtained.     Steph Reese PA-C  Liver [place] of [address] [place] [address], [address] [address]. [phone]

## 2017-09-30 LAB
ALBUMIN SERPL-MCNC: 3.8 G/DL (ref 3.6–4.8)
ALP SERPL-CCNC: 98 IU/L (ref 39–117)
ALT SERPL-CCNC: 27 IU/L (ref 0–32)
AST SERPL-CCNC: 33 IU/L (ref 0–40)
BILIRUB DIRECT SERPL-MCNC: 0.42 MG/DL (ref 0–0.4)
BILIRUB SERPL-MCNC: 1.4 MG/DL (ref 0–1.2)
BUN SERPL-MCNC: 22 MG/DL (ref 8–27)
BUN/CREAT SERPL: 41 (ref 12–28)
CALCIUM SERPL-MCNC: 8.9 MG/DL (ref 8.7–10.3)
CHLORIDE SERPL-SCNC: 105 MMOL/L (ref 96–106)
CO2 SERPL-SCNC: 21 MMOL/L (ref 18–29)
CREAT SERPL-MCNC: 0.54 MG/DL (ref 0.57–1)
ERYTHROCYTE [DISTWIDTH] IN BLOOD BY AUTOMATED COUNT: 21.3 % (ref 12.3–15.4)
FOLATE SERPL-MCNC: >20 NG/ML
GLUCOSE SERPL-MCNC: 89 MG/DL (ref 65–99)
HCT VFR BLD AUTO: 34.3 % (ref 34–46.6)
HGB BLD-MCNC: 11.7 G/DL (ref 11.1–15.9)
INR PPP: 1.1 (ref 0.8–1.2)
IRON SATN MFR SERPL: 34 % (ref 15–55)
IRON SERPL-MCNC: 125 UG/DL (ref 27–139)
MCH RBC QN AUTO: 32.6 PG (ref 26.6–33)
MCHC RBC AUTO-ENTMCNC: 34.1 G/DL (ref 31.5–35.7)
MCV RBC AUTO: 96 FL (ref 79–97)
MORPHOLOGY BLD-IMP: ABNORMAL
PLATELET # BLD AUTO: 54 X10E3/UL (ref 150–379)
POTASSIUM SERPL-SCNC: 4.2 MMOL/L (ref 3.5–5.2)
PROTHROMBIN TIME: 11.7 SEC (ref 9.1–12)
RBC # BLD AUTO: 3.59 X10E6/UL (ref 3.77–5.28)
SODIUM SERPL-SCNC: 141 MMOL/L (ref 134–144)
TIBC SERPL-MCNC: 370 UG/DL (ref 250–450)
UIBC SERPL-MCNC: 245 UG/DL (ref 118–369)
VIT B12 SERPL-MCNC: 591 PG/ML (ref 211–946)
WBC # BLD AUTO: 2.7 X10E3/UL (ref 3.4–10.8)

## 2017-10-02 LAB
AFP L3 MFR SERPL: 6.8 % (ref 0–9.9)
AFP SERPL-MCNC: 8.5 NG/ML (ref 0–8)

## 2017-10-05 ENCOUNTER — PATIENT OUTREACH (OUTPATIENT)
Dept: FAMILY MEDICINE CLINIC | Age: 62
End: 2017-10-05

## 2017-10-05 NOTE — PROGRESS NOTES
Disease Specific Care Management Note. Noris Tipton called NN to state that she received a letter. NN asked if she had any barriers to care. NN reviewed chart an gulshan did not keep appointment on 10/3/17 at with Dr walker. Patient stated that she had an appointment at the [place] [address]. NN scheduled another appointment with Eliazar Geronimo on October 10, 2017 at 2:15 pm at [facility]. Reviewed policy for no show. Maranda repeated correctly.     FU on 10/12/17

## 2017-10-10 ENCOUNTER — OFFICE VISIT (OUTPATIENT)
Dept: FAMILY MEDICINE CLINIC | Age: 62
End: 2017-10-10

## 2017-10-10 VITALS
WEIGHT: 206.2 LBS | BODY MASS INDEX: 35.39 KG/M2 | DIASTOLIC BLOOD PRESSURE: 50 MMHG | TEMPERATURE: 97.9 F | SYSTOLIC BLOOD PRESSURE: 151 MMHG | HEART RATE: 60 BPM

## 2017-10-10 DIAGNOSIS — Z13.9 ENCOUNTER FOR SCREENING: Primary | ICD-10-CM

## 2017-10-10 LAB — HGB BLD-MCNC: 11.1 G/DL

## 2017-10-10 NOTE — PROGRESS NOTES
Assessment/Plan:       ICD-10-CM ICD-9-CM    1. Encounter for screening Z13.9 V82.9 AMB POC HEMOGLOBIN (HGB)       [facility] [place] [address] [address]  Phone: [phone] Fax: [phone] [facility], [facility]  [address]  [address] [facility] [address]  Phone: [phone] Fax: [phone] [address] [facility], [address] [facility]  [place]  [address] [place] [address]  Phone: [phone] Fax: [phone]      [facility] [address]  Subjective:     Chief Complaint   Patient presents with    Elevated Blood Pressure     f/u    Matt Barrett is a 58 y.o. OTHER female. HPI: She  has a past medical history of Arthritis (2014); Esophageal varices ([address] Liver disease (2017); and Neurological disorder. She has Obesity (BMI 30-39.9), Post-traumatic osteoarthritis of left hip, Primary osteoarthritis of right knee, Chronic hepatitis C ([address] Cirrhosis ([address] Ascites, Secondary esophageal varices without bleeding ([address] Symptomatic anemia, and Portal hypertension ([address] on her problem list.   Review of Systems: Sometimes chest pain, shortness of breath, exertional dyspnea; Negative for: fever,  leg swelling, palpitations. Current Medications:   Current Outpatient Prescriptions on File Prior to Visit   Medication Sig    lisinopril (PRINIVIL, ZESTRIL) 20 mg tablet Take 1 Tab by mouth daily.  sucralfate (CARAFATE) 100 mg/mL suspension Take 10 mL by mouth every six (6) hours.  ferrous sulfate 325 mg (65 mg iron) tablet Take 1 Tab by mouth two (2) times a day.  furosemide (LASIX) 40 mg tablet Take 1 Tab by mouth daily.  spironolactone (ALDACTONE) 100 mg tablet Take 1 Tab by mouth daily. No current facility-administered medications on file prior to visit. feeling very tired in the afternoon.  She lives in [place]. Past Surgical History: She  has a past surgical history that includes cholecystectomy; gyn; and gi. Social and Family History: She  reports that she quit smoking about 21 years ago. She has never used smokeless tobacco. She reports that she does not drink alcohol or use illicit drugs. ; family history is not on file. Objective:     Vitals:    10/10/17 1204 10/10/17 1209   BP: 182/59 151/50   Pulse: 63 60   Temp: 97.9 °F (36.6 °C)    TempSrc: Oral    Weight: 206 lb 3.2 oz (93.5 kg)     No LMP recorded. Patient is postmenopausal.   Wt Readings from Last 2 Encounters:   10/10/17 206 lb 3.2 oz (93.5 kg)   09/29/17 208 lb (94.3 kg)     Results for orders placed or performed in visit on 10/10/17   AMB POC HEMOGLOBIN (HGB)   Result Value Ref Range    Hemoglobin (POC) 11.1       Physical Examination:  General appearance - well developed, no acute distress. Chest - clear to auscultation. Heart - regular rate and rhythm without murmurs, rubs, or gallops. Abdomen - bowel sounds present x 4, NT, ND. Extremities - pulses intact. No peripheral edema. Assessment/Plan:   Diagnoses and all orders for this visit:    1. Encounter for screening  -     AMB POC HEMOGLOBIN (HGB)    Her body feels tired. In 2 weeks she will start a trial of the medication for hepatitis. Follow-up Disposition: Not on File   Piter Schwartz DNP, FNP-BC, BC-ADM  Gila Trevino expressed understanding of this plan.

## 2017-10-10 NOTE — PROGRESS NOTES
Results for orders placed or performed in visit on 10/10/17   AMB POC HEMOGLOBIN (HGB)   Result Value Ref Range    Hemoglobin (POC) 11.1

## 2017-10-23 ENCOUNTER — PATIENT OUTREACH (OUTPATIENT)
Dept: FAMILY MEDICINE CLINIC | Age: 62
End: 2017-10-23

## 2017-10-23 NOTE — LETTER
11/10/2017 1:34 PM 
 
Ms. Matt Barrett [address] [address] Mi nombre es Aubrie Reynolds y me gustaria hablar con usted. Por favor llam me al  
[phone]. Suzan Mas RN, CMSRN  Nurse Navigator, Respecting Choices® [facility] Facilitator [address] [facility] [address] [address] [facility] [address] [place] [address], [facility] 13, [address] [place] [address] [address] 
[phone] (w)  [phone]. (c)  [phone] (f) [email or web address]

## 2017-10-23 NOTE — PROGRESS NOTES
Follow Up Note. DX anemia. Call # 4 of 4. Problem: Anemia, fatigue, tiredness. Left message for Maranda.

## 2017-11-10 NOTE — PROGRESS NOTES
10/23/17  Left message    11/10/17  No show for FU appointment with Dr Jhony Villalta. Left message. NN sent letter.

## 2017-11-16 ENCOUNTER — PATIENT OUTREACH (OUTPATIENT)
Dept: FAMILY MEDICINE CLINIC | Age: 62
End: 2017-11-16

## 2017-11-16 NOTE — PROGRESS NOTES
Alisha Marvin calling for refills of her medications. Patient has missed many appointment due to memory. NN facilitated the patient to write information down and will give her a reminder call Friday for Monday's  appointment at   [facility] at 1:15 pm.     Alisha Marvin is a 58 y.o. female with a DX of liver failure. She is followed at the [address] [address] by   Dr. Ralf Kaplan. This patient was received as a referral from provider referral fro support and edcuation onmedications and liver disease. Patient's challenges to self management identified:  lack of knowledge about disease, medication management and PCP relationship    Medication Management:  poor understanding    Summary of patients top three problems:     Problem 1: Hepatitis, cirrhosis, ascites, portal HTN. [facility]alf [facility]. Problem 2: Poor attendance for FU appointments with PCP. Patients motivational level on a scale of 0-10: 3of 10. Berryporfirio Tha is interested in getting her prescriptions filled. She stated that she will write down appointment in order to remember when the appointment takes place. NN will call to remind day prior to appointment. Advance Care Planning:   Patient was offered the opportunity to discuss advance care planning:  no     Does patient have an Advance Directive:  no   If no, did you provide information on Advance Care Planning?  no     [employer], Referrals, and Durable Medical Equipment:  Keep PCP Fu appointment. Follow up appointments:  11/20/17 at [facility] at 1:45 pm with PCP and [address] [address] appointment on 12/21/17 at [facility] [place] [address]. Goals      Knowledge and adherence of prescribed medication (ie. action, side effects, missed dose, etc.). Inderal titrate as directed, Ferous Sulfate and Fe IV at infusion center. IM    11/16/17  Maranda is taking medications as directed, Her supply of medications is low.  Sharyn Almazan is not keeping appointment with PCP. NN gave her information on how a provider will not provide refills without seeing patients. NN scheduled an appointment by 11/20/17. IM       relationship with PCP            Maranda Otero will keep FU appointment. IM    11/16/17  Poor adherence with PCP appointments, patent states due to remembering the dates. NN asked that patient will write down appointment date and a reminder call from NN initially. Keep appointment on 11/20/17 at [facility]. IM. Patient verbalized understanding of all information discussed. Patient has this Nurse Navigators contact information for any further questions, concerns, or needs.

## 2017-11-20 ENCOUNTER — OFFICE VISIT (OUTPATIENT)
Dept: FAMILY MEDICINE CLINIC | Age: 62
End: 2017-11-20

## 2017-11-20 VITALS
WEIGHT: 200 LBS | SYSTOLIC BLOOD PRESSURE: 180 MMHG | BODY MASS INDEX: 34.33 KG/M2 | HEART RATE: 66 BPM | DIASTOLIC BLOOD PRESSURE: 90 MMHG

## 2017-11-20 DIAGNOSIS — K74.69 OTHER CIRRHOSIS OF LIVER (HCC): Primary | ICD-10-CM

## 2017-11-20 DIAGNOSIS — Z23 ENCOUNTER FOR IMMUNIZATION: ICD-10-CM

## 2017-11-20 DIAGNOSIS — I10 ESSENTIAL HYPERTENSION: ICD-10-CM

## 2017-11-20 RX ORDER — LISINOPRIL 20 MG/1
20 TABLET ORAL DAILY
Qty: 90 TAB | Refills: 1 | Status: SHIPPED | OUTPATIENT
Start: 2017-11-20 | End: 2018-01-26 | Stop reason: SDUPTHER
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
## 2017-11-20 NOTE — PROGRESS NOTES
RN called the [facility] lab to inquire what tube to draw the Ammonia lab in. The lab advised that it has to be drawn in a green top tube, and must be on ice and processed within 20 minutes. As this cannot be done on the [facility], pt was given the lab form to take to the Lab at [address] [place] [address] as she knows where that is. SHe will also have a CBC and CMP done at the same time. Kerry Wing RN     The following was performed at discharge station per provider:  Pt would like to discuss medical bills and care card application. RN will send an email to the outreach workers to contact pt regarding these issues. Pt states that she can understand [place]. Kerry Wing RN     The following was documented by Martha Carcamo RN. Patient stated no egg allergy. FLU Immunization given per protocol. [facility] and updated copy given to patient. [facility] information sheet given with instructions concerning adverse effects and allergic reaction which would indicate need to be seen in the ER. Patient expressed understanding. Pt had no adverse reaction at time of discharge.   Martha Carcamo RN

## 2017-11-20 NOTE — PROGRESS NOTES
Coordination of Care  1. Have you been to the ER, urgent care clinic since your last visit? Hospitalized since your last visit? No    2. Have you seen or consulted any other health care providers outside of the [address] [place] [address] since your last visit? Include any pap smears or colon screening. No    Medications  Does the patient need refills? YES    Learning Assessment Complete?  yes

## 2017-11-26 PROBLEM — B19.20 DECOMPENSATED CIRRHOSIS RELATED TO HEPATITIS C VIRUS (HCV) (HCC): Status: ACTIVE | Noted: 2017-11-26

## 2017-11-26 PROBLEM — D62 ACUTE POST-HEMORRHAGIC ANEMIA: Status: ACTIVE | Noted: 2017-11-26

## 2017-11-26 PROBLEM — R60.9 PERIPHERAL EDEMA: Status: ACTIVE | Noted: 2017-11-26

## 2017-11-26 PROBLEM — K74.69 DECOMPENSATED CIRRHOSIS RELATED TO HEPATITIS C VIRUS (HCV) (HCC): Status: ACTIVE | Noted: 2017-11-26

## 2017-11-26 PROBLEM — N93.9 VAGINAL BLEEDING: Status: ACTIVE | Noted: 2017-11-26

## 2017-12-01 ENCOUNTER — HOSPITAL ENCOUNTER (OUTPATIENT)
Dept: LAB | Age: 62
Discharge: HOME | End: 2017-12-01

## 2017-12-01 LAB
ALBUMIN SERPL-MCNC: 3.1 G/DL (ref 3.5–5)
ALBUMIN/GLOB SERPL: 0.8 {RATIO} (ref 1.1–2.2)
ALP SERPL-CCNC: 165 U/L (ref 45–117)
ALT SERPL-CCNC: 45 U/L (ref 12–78)
AMMONIA PLAS-SCNC: 53 UMOL/L
ANION GAP SERPL CALC-SCNC: 7 MMOL/L (ref 5–15)
AST SERPL-CCNC: 41 U/L (ref 15–37)
BASOPHILS # BLD: 0 K/UL (ref 0–0.1)
BASOPHILS NFR BLD: 0 % (ref 0–1)
BILIRUB SERPL-MCNC: 0.9 MG/DL (ref 0.2–1)
BUN SERPL-MCNC: 18 MG/DL (ref 6–20)
BUN/CREAT SERPL: 33 (ref 12–20)
CALCIUM SERPL-MCNC: 8.2 MG/DL (ref 8.5–10.1)
CHLORIDE SERPL-SCNC: 111 MMOL/L (ref 97–108)
CO2 SERPL-SCNC: 25 MMOL/L (ref 21–32)
CREAT SERPL-MCNC: 0.54 MG/DL (ref 0.55–1.02)
DIFFERENTIAL METHOD BLD: ABNORMAL
EOSINOPHIL # BLD: 0.1 K/UL (ref 0–0.4)
EOSINOPHIL NFR BLD: 2 % (ref 0–7)
ERYTHROCYTE [DISTWIDTH] IN BLOOD BY AUTOMATED COUNT: 14.2 % (ref 11.5–14.5)
GLOBULIN SER CALC-MCNC: 3.7 G/DL (ref 2–4)
GLUCOSE SERPL-MCNC: 96 MG/DL (ref 65–100)
HCT VFR BLD AUTO: 30.8 % (ref 35–47)
HGB BLD-MCNC: 9.9 G/DL (ref 11.5–16)
LYMPHOCYTES # BLD: 0.6 K/UL (ref 0.8–3.5)
LYMPHOCYTES NFR BLD: 24 % (ref 12–49)
MCH RBC QN AUTO: 32.8 PG (ref 26–34)
MCHC RBC AUTO-ENTMCNC: 32.1 G/DL (ref 30–36.5)
MCV RBC AUTO: 102 FL (ref 80–99)
MONOCYTES # BLD: 0.2 K/UL (ref 0–1)
MONOCYTES NFR BLD: 6 % (ref 5–13)
NEUTS SEG # BLD: 1.7 K/UL (ref 1.8–8)
NEUTS SEG NFR BLD: 68 % (ref 32–75)
PLATELET # BLD AUTO: 51 K/UL (ref 150–400)
POTASSIUM SERPL-SCNC: 3.8 MMOL/L (ref 3.5–5.1)
PROT SERPL-MCNC: 6.8 G/DL (ref 6.4–8.2)
RBC # BLD AUTO: 3.02 M/UL (ref 3.8–5.2)
RBC MORPH BLD: ABNORMAL
RBC MORPH BLD: ABNORMAL
SODIUM SERPL-SCNC: 143 MMOL/L (ref 136–145)
WBC # BLD AUTO: 2.6 K/UL (ref 3.6–11)

## 2017-12-01 PROCEDURE — 85025 COMPLETE CBC W/AUTO DIFF WBC: CPT | Performed by: FAMILY MEDICINE

## 2017-12-01 PROCEDURE — 82140 ASSAY OF AMMONIA: CPT | Performed by: FAMILY MEDICINE

## 2017-12-01 PROCEDURE — 80053 COMPREHEN METABOLIC PANEL: CPT | Performed by: FAMILY MEDICINE

## 2017-12-01 PROCEDURE — 36415 COLL VENOUS BLD VENIPUNCTURE: CPT | Performed by: FAMILY MEDICINE

## 2017-12-04 ENCOUNTER — PATIENT OUTREACH (OUTPATIENT)
Dept: FAMILY MEDICINE CLINIC | Age: 62
End: 2017-12-04

## 2017-12-04 NOTE — PROGRESS NOTES
Zulma Adams is a 58 y.o. female with a dx of Cirrhosis. This patient was received as a referral from provider referral [facility], [address]. Patient's challenges to self management identified:  lack of knowledge about disease    Medication Management:  poor adherence, poor understanding    Summary of patients top three problems:     Problem 1: Cirrhosis-     Problem 2: Hepatic encephalopathy. Problem 3: Memory changes-     Patients motivational level on a scale of 0-10:Maranda is motivated donald 5 of 10. Sharan Zaman asks great questions regarding the process of completed applications for assistance with medications. Advance Care Planning:   Patient was offered the opportunity to discuss advance care planning:  no     Does patient have an Advance Directive:  no   If no, did you provide information on Advance Care Planning?  no     [employer], Referrals, and Durable Medical Equipment: referral for Specific Disease Case Management. Follow up appointments:  12/4/17 with Dr Amanda Gee. Goals      Knowledge and adherence of prescribed medication (ie. action, side effects, missed dose, etc.). Inderal titrate as directed, Ferous Sulfate and Fe IV at infusion center. IM    11/16/17  Maranda is taking medications as directed, Her supply of medications is low. Sharan Zaman is not keeping appointment with PCP. NN gave her information on how a provider will not provide refills without seeing patients. NN scheduled an appointment by 11/20/17. IM       relationship with PCP            Maranda Bhandari will keep FU appointment. IM    11/16/17  Poor adherence with PCP appointments, patent states due to remembering the dates. NN asked that patient will write down appointment date and a reminder call from NN initially. Keep appointment on 11/20/17 at [facility]. IM. Patient verbalized understanding of all information discussed.  Patient has this Nurse Navigators contact information for any further questions, concerns, or needs.

## 2017-12-04 NOTE — Clinical Note
This patient was given a prescription from Dr. Dejuan Barakat- at [facility] for a medication for Liver cirrhosis

## 2017-12-06 NOTE — PROGRESS NOTES
CBC with stable leukopenia, anemia, thrombocytopenia  Renal function good, electrolytes and sugar fine, Bilirubin wnl  Ammonia elevated, pt may benefit from lactulose in the setting of dizziness, memory problems ?mild encephalopathy  I will forward results to hepatologist and discuss with patient at follow up on 12/14  Has appt with Dr. Andrea Rubalcava on 12/21

## 2017-12-14 ENCOUNTER — TELEPHONE (OUTPATIENT)
Dept: FAMILY MEDICINE CLINIC | Age: 62
End: 2017-12-14

## 2017-12-14 ENCOUNTER — HOSPITAL ENCOUNTER (OUTPATIENT)
Dept: LAB | Age: 62
Discharge: HOME | End: 2017-12-14

## 2017-12-14 ENCOUNTER — OFFICE VISIT (OUTPATIENT)
Dept: FAMILY MEDICINE CLINIC | Age: 62
End: 2017-12-14

## 2017-12-14 VITALS
TEMPERATURE: 97.9 F | HEART RATE: 59 BPM | WEIGHT: 209 LBS | BODY MASS INDEX: 41.03 KG/M2 | SYSTOLIC BLOOD PRESSURE: 180 MMHG | DIASTOLIC BLOOD PRESSURE: 77 MMHG | HEIGHT: 60 IN

## 2017-12-14 DIAGNOSIS — K92.0 HEMATEMESIS WITHOUT NAUSEA: ICD-10-CM

## 2017-12-14 DIAGNOSIS — Z71.89 COUNSELING AND COORDINATION OF CARE: Primary | ICD-10-CM

## 2017-12-14 DIAGNOSIS — I10 ESSENTIAL HYPERTENSION: ICD-10-CM

## 2017-12-14 DIAGNOSIS — K74.60 HEPATIC CIRRHOSIS, UNSPECIFIED HEPATIC CIRRHOSIS TYPE (HCC): ICD-10-CM

## 2017-12-14 DIAGNOSIS — K92.0 HEMATEMESIS WITH NAUSEA: Primary | ICD-10-CM

## 2017-12-14 LAB
ALBUMIN SERPL-MCNC: 3.3 G/DL (ref 3.5–5)
ALBUMIN/GLOB SERPL: 0.8 {RATIO} (ref 1.1–2.2)
ALP SERPL-CCNC: 135 U/L (ref 45–117)
ALT SERPL-CCNC: 50 U/L (ref 12–78)
ANION GAP SERPL CALC-SCNC: 7 MMOL/L (ref 5–15)
AST SERPL-CCNC: 45 U/L (ref 15–37)
BASOPHILS # BLD: 0 K/UL (ref 0–0.1)
BASOPHILS NFR BLD: 0 % (ref 0–1)
BILIRUB SERPL-MCNC: 1.1 MG/DL (ref 0.2–1)
BUN SERPL-MCNC: 20 MG/DL (ref 6–20)
BUN/CREAT SERPL: 43 (ref 12–20)
CALCIUM SERPL-MCNC: 8.3 MG/DL (ref 8.5–10.1)
CHLORIDE SERPL-SCNC: 109 MMOL/L (ref 97–108)
CO2 SERPL-SCNC: 26 MMOL/L (ref 21–32)
CREAT SERPL-MCNC: 0.47 MG/DL (ref 0.55–1.02)
DIFFERENTIAL METHOD BLD: ABNORMAL
EOSINOPHIL # BLD: 0.1 K/UL (ref 0–0.4)
EOSINOPHIL NFR BLD: 2 % (ref 0–7)
ERYTHROCYTE [DISTWIDTH] IN BLOOD BY AUTOMATED COUNT: 13.6 % (ref 11.5–14.5)
GLOBULIN SER CALC-MCNC: 4.2 G/DL (ref 2–4)
GLUCOSE SERPL-MCNC: 100 MG/DL (ref 65–100)
HCT VFR BLD AUTO: 34 % (ref 35–47)
HGB BLD-MCNC: 10.7 G/DL (ref 11.5–16)
HGB BLD-MCNC: 11.1 G/DL
LYMPHOCYTES # BLD: 0.6 K/UL (ref 0.8–3.5)
LYMPHOCYTES NFR BLD: 19 % (ref 12–49)
MCH RBC QN AUTO: 31.5 PG (ref 26–34)
MCHC RBC AUTO-ENTMCNC: 31.5 G/DL (ref 30–36.5)
MCV RBC AUTO: 100 FL (ref 80–99)
MONOCYTES # BLD: 0.2 K/UL (ref 0–1)
MONOCYTES NFR BLD: 8 % (ref 5–13)
NEUTS SEG # BLD: 2 K/UL (ref 1.8–8)
NEUTS SEG NFR BLD: 71 % (ref 32–75)
PLATELET # BLD AUTO: 58 K/UL (ref 150–400)
POTASSIUM SERPL-SCNC: 4.1 MMOL/L (ref 3.5–5.1)
PROT SERPL-MCNC: 7.5 G/DL (ref 6.4–8.2)
RBC # BLD AUTO: 3.4 M/UL (ref 3.8–5.2)
RBC MORPH BLD: ABNORMAL
SODIUM SERPL-SCNC: 142 MMOL/L (ref 136–145)
WBC # BLD AUTO: 2.9 K/UL (ref 3.6–11)

## 2017-12-14 PROCEDURE — 85025 COMPLETE CBC W/AUTO DIFF WBC: CPT | Performed by: FAMILY MEDICINE

## 2017-12-14 PROCEDURE — 80053 COMPREHEN METABOLIC PANEL: CPT | Performed by: FAMILY MEDICINE

## 2017-12-14 RX ORDER — LACTULOSE 10 G/15ML
10 SOLUTION ORAL 2 TIMES DAILY
Qty: 1000 ML | Refills: 1 | Status: SHIPPED | OUTPATIENT
Start: 2017-12-14 | End: 2018-01-26 | Stop reason: SDUPTHER

## 2017-12-14 RX ORDER — TRAMADOL HYDROCHLORIDE 50 MG/1
50 TABLET ORAL
COMMUNITY
End: 2018-01-26 | Stop reason: ALTCHOICE

## 2017-12-14 RX ORDER — METOPROLOL TARTRATE 25 MG/1
TABLET, FILM COATED ORAL 2 TIMES DAILY
COMMUNITY
End: 2018-01-10

## 2017-12-14 RX ORDER — PROPRANOLOL HYDROCHLORIDE 10 MG/1
TABLET ORAL 3 TIMES DAILY
COMMUNITY
End: 2018-01-26 | Stop reason: SDUPTHER
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
## 2017-12-14 NOTE — PROGRESS NOTES
Coordination of Care  1. Have you been to the ER, urgent care clinic since your last visit? Hospitalized since your last visit? No    2. Have you seen or consulted any other health care providers outside of the [address] [place] [address] since your last visit? Include any pap smears or colon screening. No    Medications  Does the patient need refills? NO    Learning Assessment Complete? yes        1300: repeat BPs entered and reviewed with Dr. Estephania Snowden who stated that the patient could leave the clinic.  Kaden Scott RN

## 2017-12-14 NOTE — MR AVS SNAPSHOT
Visit Information [place] Personal Médico Departamento Teléfono del Dep. Número de visita 12/14/2017 11:30 AM Steffanie Wharton MD [address] [phone] Follow-up Instructions Return in about 3 weeks (around 1/4/2018) for Follow up appt ER precautions - go to ED if continued blood in emesis or dark or bloody stools, dizz. Your Appointments 12/21/2017  1:30 PM  
PROCEDURE with MD María Elena Youngblood 75 [phone] [facility]) Appt Note: EGD  
 [address] [facility] Eliseo [phone] [place] [id number]  
[address] [facility] Upcoming Health Maintenance Date Due Pneumococcal 19-64 Medium Risk (1 of 1 - PPSV23) 3/5/1974 DTaP/Tdap/Td series (1 - Tdap) 3/5/1976 PAP AKA CERVICAL CYTOLOGY 3/5/1976 ZOSTER VACCINE AGE 60> 1/5/2015 FOBT Q 1 YEAR AGE 50-75 8/17/2018 Alergias  Review Complete El: 12/14/2017 Por: Enoch Sweet RN  
 A partir del:  12/14/2017 Intensidad Anotado Tipo de reacción [facility] Financial Morphine  03/11/2011    Nausea and Vomiting, Other (comments) Histamine release? Red streak on arm after IV injection Vacunas actuales Revisadas el:  8/20/2017 Lashawn Fill Influenza Vaccine (Quad) PF 11/20/2017, 10/20/2016 No revisadas esta visita You Were Diagnosed With   
  
 [place] Hematemesis without nausea    -  Primary ICD-10-CM: K92.0 ICD-9-CM: 578.0 Partes vitales PS Pulso Temperatura Debary ( percentil de crecimiento) Peso (percentil de crecimiento) BMI (IMC)  
 186/74 (BP 1 Location: Left arm, BP Patient Position: Sitting) 67 97.9 °F (36.6 °C) (Oral) 5' 0.14\" (1.528 m) 209 lb (94.8 kg) 40.63 kg/m2 Estado obstétrico Estatus de tabaquísmo Postmenopausal Former Smoker Historial de signos vitales BMI and BSA Data Body Mass Index Body Surface Area  
 40.63 kg/m 2 2.01 m 2 Kristin Alfonso Pharmacy Name Phone [phone] [address], [address] [facility] [phone] Tam lista de medicamentos actualizada Lista actualizada el: 12/14/17 12:12 PM.  Siempre use tam lista de medicamentos más reciente. ferrous sulfate 325 mg (65 mg iron) tablet Take 1 Tab by mouth two (2) times a day. furosemide 40 mg tablet También conocido ofelia:  LASIX Take 1 Tab by mouth daily. lactulose 20 gram/30 mL Soln solution También conocido ofelia:  [address] [place] [address] Take 15 mL by mouth two (2) times a day. lisinopril 20 mg tablet También conocido ofelia:  Caroline Thomas Take 1 Tab by mouth daily. Portuguese instructions  
  
 spironolactone 100 mg tablet También conocido ofelia:  ALDACTONE Take 1 Tab by mouth daily. sucralfate 100 mg/mL suspension También conocido ofelia:  Zettie Furnish Take 10 mL by mouth every six (6) hours. Recetas Enviado a la [place] Refills  
 lactulose (CHRONULAC) 20 gram/30 mL soln solution 1 Sig: Take 15 mL by mouth two (2) times a day. Class: Normal  
 Pharmacy: [address] [place] [address], [address] [place] [address] Ph #: [phone] Route: Oral  
  
Hicimos lo siguiente AMB POC HEMOGLOBIN (HGB) [95836 CPT(R)] Instrucciones de seguimiento Return in about 3 weeks (around 1/4/2018) for Follow up appt ER precautions - go to ED if continued blood in emesis or dark or bloody stools, dizz. Instrucciones para el Paciente Trombocitopenia: Instrucciones de cuidado - [ Thrombocytopenia: Care Instructions ] Instrucciones de cuidado La trombocitopenia es lam baja cantidad de plaquetas de la fernanda. Las plaquetas son las células que ayudan a la coagulación de la fernanda.  Si usted no tiene suficientes de ellas, tam fernanda no puede coagularse sylvie. Por lo tanto, es más difícil dejar de sangrar. Podría tener pocas plaquetas porque la médula ósea no las produce. O las defensas del cuerpo (sistema inmunitario) las destruye. Tener un bazo agrandado también puede disminuir la cantidad de plaquetas en la fernanda. La razón es que estas pueden quedar atrapadas en el bazo agrandado. Determinadas enfermedades o medicamentos también pueden hacer que tenga pocas plaquetas. Roman las plaquetas pueden volver a los niveles normales si se trata la enfermedad o si pinky de monika el medicamento. Es posible que no necesite tratamiento si el problema es leve. Si necesita tratamiento, podría recibir plaquetas adicionales en tam fernanda. O podrían dársele medicamentos para detener la pérdida de plaquetas o ayudar a que tam cuerpo las produzca. La atención de seguimiento es lam parte clave de tam tratamiento y seguridad. Asegúrese de hacer y acudir a todas las citas, y llame a tam médico si está teniendo problemas. También es lam buena idea saber los resultados de los exámenes y mantener lam lista de los medicamentos que bony. Cómo puede cuidarse en el hogar? · Sea oneil con los medicamentos. [employer] medicamentos exactamente ofelia le fueron recetados. Llame a tam médico si vi estar teniendo problemas con tam medicamento. · No tome aspirina ni medicamentos antiinflamatorios, a menos que tam médico le diga que puede [place]. Los ejemplos incluyen ibuprofeno (Advil, Motrin) y naproxeno (Aleve). Estos pueden elevar el riesgo de sangrado. · Evite los deportes de contacto o las actividades que pudieran provocarle caídas. Cuándo debe pedir ayuda? Llame al 911 en cualquier momento que considere que necesita atención de [place]. Por ejemplo, llame si: 
? · Se desmayó (perdió el conocimiento). ? · Tiene un traumatismo craneal (lesión en la felicitas). ? · Tiene dolor intenso y repentino, sobre todo en lam articulación. ? · Tiene un dolor de felicitas repentino e intenso que es diferente de otros nancy de felicitas anteriores. ?Llame a tam médico ahora mismo o busque atención médica inmediata si: 
? · Siente mareo o aturdimiento, o siente que se va a desmayar. ? · No puede dejar de sangrar después de haberse administrado factores de coagulación tras lam lesión. ? · Tiene lam lesión jaime no está seguro de si necesitará tratamiento. ? · Tiene cualquier sangrado anormal, ofelia: 
¨ Hemorragias (sangrado) nasales. ¨ Sangrado vaginal que es distinto (más intenso, más frecuente, en un momento diferente del mes) del que [facility] tener. ¨ Heces sanguinolentas o negras, o sangrado por el recto. ¨ Orina sanguinolenta o de color solano. ?Preste especial atención a los cambios en tma sreekanth y asegúrese de comunicarse con tam médico si: 
? · Tiene dolor o hinchazón en las articulaciones. Dónde puede encontrar más información en inglés? Rylie Proper a http://constance-brian.info/. [place] [employer] Z179 en la búsqueda para aprender más acerca de \"Trombocitopenia: Instrucciones de cuidado - [ Thrombocytopenia: Care Instructions ]. \" 
Revisado: 13 octubre, 2016 Versión del contenido: 11.4 © 8545-6470 Healthwise, Incorporated. Las instrucciones de cuidado fueron adaptadas bajo licencia por [employer] (which disclaims liability or warranty for this information). Si usted tiene [place] afección médica o sobre estas instrucciones, siempre pregunte a tam profesional de sreekanth. [employer]orporated niega toda garantía o responsabilidad por tam uso de esta información. Cirrosis: Instrucciones de cuidado - [ Cirrhosis: Care Instructions ] Instrucciones de cuidado La cirrosis ocurre cuando el tejido lilia del hígado es reemplazado por tejido cicatricial. Fountain impide el buen funcionamiento del hígado.  Por lo general, aparece después de que un hígado alvarado estado inflamado por años. La cirrosis es causada la mayoría de las veces por el consumo excesivo de alcohol o por lam infección por hepatitis. Roman también hay otras causas. Estas incluyen medicamentos y [place] grasa en el hígado. También puede ser causada por afecciones hereditarias y otros trastornos. En algunos casos, la causa no se encuentra. El tratamiento no puede reparar completamente el daño hepático. Roman magalys vez pueda retardar o prevenir más daño si no samara alcohol ni [place] drogas que dañen el hígado. La atención de seguimiento es lam parte clave de tam tratamiento y seguridad. Asegúrese de hacer y acudir a todas las citas, y llame a tam médico si está teniendo problemas. También es lam buena idea saber los resultados de los exámenes y mantener lam lista de los medicamentos que bony. Cómo puede cuidarse en el hogar? · No tai nada de alcohol. Puede dañarle el hígado. Hable con tam médico si necesita ayuda para dejar de beber alcohol. · Sea oneil con los medicamentos. [employer] medicamentos exactamente ofelia le fueron recetados. Llame a tam médico si vi estar teniendo un problema con tam medicamento. · Hable con tam médico antes de monika cualquier otro medicamento. Se incluyen medicamentos de venta izabella y productos herbarios. · Tenga cuidado al monika acetaminofén (Tylenol), ibuprofeno (Advil, Motrin) o naproxeno (Aleve). Estos a veces pueden causar más daño hepático. Hable con tam médico si no está seguro de cuáles medicamentos son seguros. · Si tam cirrosis hace que se acumule exceso de líquido en tam cuerpo, trate de no ingerir mucha sal. Use menos sal en la ortega y al cocinar. No coma comida rápida ni refrigerios con mucha sal. El exceso de líquidos en el abdomen, las piernas y el pecho puede causarle problemas graves.  
· Colabore con tam médico o dietista para asegurarse de come la cantidad adecuada de carbohidratos, proteínas, grasa y sodio (sal). Es muy importante que elija los mejores alimentos para la sreekanth de tam hígado. · Si tam médico se lo recomienda, limite la cantidad de líquido que samara. · Si tam médico se lo recomienda, harjinder más ejercicio. Caminar es lam buena opción. Poco a poco, aumente la distancia que [employer]. Intente hacer por lo menos 30 minutos de ejercicio la mayoría de los días de la [place]. Quizás también quiera nadar, montar en bicicleta o hacer otras actividades. Cuándo debe pedir ayuda? Llame al 911 en cualquier momento que considere que necesita atención de [place]. Por ejemplo, llame si: 
? · Se desmayó (perdió el conocimiento). ?Llame a tam médico ahora mismo o busque atención médica inmediata si: 
? · Se siente muy somnoliento o confuso. ? · Tiene nuevo dolor abdominal o tam dolor empeora. ? · Tiene fiebre. ? · Tiene un nuevo tinte amarillento o tameka es más intenso en la piel o en la parte christopher de los ojos. ? · Tiene cualquier tipo de sangrado (hemorragia) anormal, ofelia: 
¨ Hemorragias nasales. ¨ Sangrado vaginal que es diferente (más intenso, más frecuente, en un momento diferente del mes) del cual está acostumbrada. ¨ Heces sanguinolentas (con fernanda) o negruzcas, o sangrado rectal. 
¨ Orina sanguinolenta o de color solano. ?Preste especial atención a los cambios en tam sreekanth y asegúrese de comunicarse con tam médico si tiene cualquier problema. Dónde puede encontrar más información en inglés? Ralf New a http://constance-brian.info/. Escriba M412 en la búsqueda para aprender más acerca de \"Cirrosis: Instrucciones de cuidado - [ Cirrhosis: Care Instructions ]. \" 
Revisado: 12 mayo, 2017 Versión del contenido: 11.4 © 1950-7409 Healthwise, [employer]. Las instrucciones de cuidado fueron adaptadas bajo licencia por Good Help Connections (which disclaims liability or warranty for this information).  Si usted tiene preguntas sobre lam afección médica o sobre estas instrucciones, siempre pregunte a tam profesional de sreekanth. [facility], Incorporated niega toda garantía o responsabilidad por tam uso de esta información. Introducing [facility]! [facility] introduce portal paciente MyChart . Ahora se puede acceder a partes de tam expediente médico, enviar por correo electrónico la oficina de tam médico y solicitar renovaciones de medicamentos en línea. En tam navegador de Internet , [place] a [email or web address] Harjinder clic en el usuario por Juan Daniels? Vish Zaragoza clic aquí en la sesión [facility]. Verá la página de registro [place]. Ingrese tam código de [facility] magalys y ofelia aparece a continuación. Usted no tendrá que [employer] código después de sukhdeep completado el proceso de registro . Si usted no se inscribe antes de la fecha de caducidad , debe solicitar un nuevo código. · MyChart Código de acceso : [id number] Expires: 2/18/2018  3:05 PM 
 
Ingresa los últimos cuatro dígitos de tam Número de Seguro Social ( xxxx ) y fecha de nacimiento ( dd / mm / aaaa ) ofelia se indica y harjinder clic en Enviar. Usted será llevado a la siguiente página de registro . Crear un ID MyChart . Esta será tam ID de inicio de sesión de MyChart y no puede ser [place] , por lo que pensar en lam que es Noah Carp y fácil de recordar . Crear lam contraseña MyChart . Usted puede cambiar tam contraseña en cualquier momento . Ingrese tam Password Reset de preguntas y [place] . [place] se puede utilizar en un momento posterior si usted olvida tam contraseña. Introduzca tam dirección de correo electrónico . Ramírez Espinoza recibirá lam notificación por correo electrónico cuando la nueva información está disponible en MyChart . Alexandra davey en Registrarse. Coretha Bible víctor y descargar porciones de tam expediente médico. 
Harjinder petar en el enlace de descarga del menú Resumen para descargar lam copia portátil de tam información médica . Si tiene [employer] , por favor visite la sección de preguntas frecuentes del sitio web MyChart . Recuerde, MyChart NO es que se utilizará para las necesidades urgentes. Para emergencias médicas , llame al 911 . Ahora disponible en atm iPhone y Android ! Por favor proporcione tameka resumen de la documentación de cuidado a tam próximo proveedor. Your primary care clinician is listed as Maximus Damon. If you have any questions after today's visit, please call [phone].

## 2017-12-14 NOTE — PROGRESS NOTES
CBC - Hgb 10.7, improved from last check 2 weeks ago and PLTs stable/slightly up at 58  CMP - electrolytes wnl, bilirubin elevated  I called pt and left VM, she may return call to office  If pt calls to discuss results, please let her know no reason to go to ER based on these results, but she should go if she has recurrent hematemesis, bloody or dark stools, fatigue, CP, SOB, palpitations, abd pain, fever, chills, confusion

## 2017-12-14 NOTE — PROGRESS NOTES
Reviewed AVS, prescription and pharmacy location with patient. Per Dr. Deyanira Crawford instructions patient to take metoprolol and propranolol now with a snack - recheck BP in 30 mins ( When I met with patient, at 12:15pm, patient told me that she had just finish taking metoprolol and propanol, nurse did not see patient taking this medicine. Patient will wait now 30 minutes to get her BP recheck. Discussed with patient that if at any given time while she is waiting at  the clinic she does not feel well to please let a nurse know. ER precautions were discussed/reviewed with patient - go to ED if continued blood in emesis or dark or bloody stools, dizziness, fatigue, CP, SOB, fever, chills, abd pain. Pt directed to registration to make follow up appt. In 3 weeks. Patient verbalizes understanding and does not have any questions or concerns at this time. Billy Hardy RN      BP was recheck and entered in CC, MD aware of BP and HR and per MD patient is okay to go home, but to please follow ER precautions discussed earlier. Discussed this with patient. Patient verbalizes understanding.  Billy Hardy RN

## 2017-12-14 NOTE — LETTER
NOTIFICATION 
 
12/14/2017 11:51 AM 
 
Ms. Cathy Arevalo [address] [place] [phone] To Whom It May Concern: 
 
Cathy Arevalo is currently under the care of [facility]. She is currently unemployed and not working. If there are questions or concerns please have the patient contact our office. Sincerely, Dav Wong MD

## 2017-12-14 NOTE — TELEPHONE ENCOUNTER
I called pt to relay STAT lab results, no answer so left VM to call office and reassured that labs results ok, not urgent    CBC resulted with Hgb ok, up from last check 2 weeks ago and PLTs stable/slightly up at 58  CMP with electrolytes wnl, bilirubin now elevated    Pt was given ER precautions at her appt and I will review these again with pt if I speak with her, would appreciate nurse doing the same if they speak with pt

## 2017-12-14 NOTE — PATIENT INSTRUCTIONS
Trombocitopenia: Instrucciones de cuidado - [ Thrombocytopenia: Care Instructions ]  Instrucciones de cuidado    La trombocitopenia es lam baja cantidad de plaquetas de la fernanda. Las plaquetas son las células que ayudan a la coagulación de la fernanda. Si usted no tiene suficientes de ellas, tam fernanda no puede coagularse sylvie. Por lo tanto, es más difícil dejar de sangrar. Podría tener pocas plaquetas porque la médula ósea no las produce. O las defensas del cuerpo (sistema inmunitario) las destruye. Tener un bazo agrandado también puede disminuir la cantidad de plaquetas en la fernanda. La razón es que estas pueden quedar atrapadas en el bazo agrandado. Determinadas enfermedades o medicamentos también pueden hacer que tenga pocas plaquetas. Roman las plaquetas pueden volver a los niveles normales si se trata la enfermedad o si pinky de monika el medicamento. Es posible que no necesite tratamiento si el problema es leve. Si necesita tratamiento, podría recibir plaquetas adicionales en tam fernanda. O podrían dársele medicamentos para detener la pérdida de plaquetas o ayudar a que tam cuerpo las produzca. La atención de seguimiento es lam parte clave de tam tratamiento y seguridad. Asegúrese de hacer y acudir a todas las citas, y llame a tam médico si está teniendo problemas. También es lam buena idea saber los resultados de los exámenes y mantener lam lista de los medicamentos que bony. ¿Cómo puede cuidarse en el hogar? · Sea oneil con los medicamentos. [employer] medicamentos exactamente ofelia le fueron recetados. Llame a tam médico si vi estar teniendo problemas con tam medicamento. · No tome aspirina ni medicamentos antiinflamatorios, a menos que tam médico le diga que puede [place]. Los ejemplos incluyen ibuprofeno (Advil, Motrin) y naproxeno (Aleve). Estos pueden elevar el riesgo de sangrado. · Evite los deportes de contacto o las actividades que pudieran provocarle caídas. ¿Cuándo debe pedir ayuda?   Llame al 911 en cualquier momento que considere que necesita atención de [place]. Por ejemplo, llame si:  ? · Se desmayó (perdió el conocimiento). ? · Tiene un traumatismo craneal (lesión en la felicitas). ? · Tiene dolor intenso y repentino, sobre todo en lam articulación. ? · Tiene un dolor de felicitas repentino e intenso que es diferente de otros nancy de felicitas anteriores. ?Llame a tam médico ahora mismo o busque atención médica inmediata si:  ? · Siente mareo o aturdimiento, o siente que se va a desmayar. ? · No puede dejar de sangrar después de haberse administrado factores de coagulación tras lam lesión. ? · Tiene lam lesión jaime no está seguro de si necesitará tratamiento. ? · Tiene cualquier sangrado anormal, ofelia:  ¨ Hemorragias (sangrado) nasales. ¨ Sangrado vaginal que es distinto (más intenso, más frecuente, en un momento diferente del mes) del que [facility] and [facility] tener. ¨ Heces sanguinolentas o negras, o sangrado por el recto. ¨ Orina sanguinolenta o de color solano. ?Preste especial atención a los cambios en tam sreekanth y asegúrese de comunicarse con tam médico si:  ? · Tiene dolor o hinchazón en las articulaciones. ¿Dónde puede encontrar más información en inglés? Taylor Lower a http://constance-brian.info/. Julianna [id number] en la búsqueda para aprender más acerca de \"Trombocitopenia: Instrucciones de cuidado - [ Thrombocytopenia: Care Instructions ]. \"  Revisado: 13 octubre, 2016  Versión del contenido: 11.4  © 6812-6189 Healthwise, Incorporated. Las instrucciones de cuidado fueron adaptadas bajo licencia por [employer] (which disclaims liability or warranty for this information). Si usted tiene [place] afección médica o sobre estas instrucciones, siempre pregunte a tam profesional de sreekanth. Healthwise, Incorporated niega toda garantía o responsabilidad por tam uso de esta información.        Cirrosis: Instrucciones de cuidado - [ Cirrhosis: Care Instructions ]  Instrucciones de cuidado    La cirrosis ocurre cuando el tejido lilia del hígado es reemplazado por tejido cicatricial. Champion impide el buen funcionamiento del hígado. Por lo general, aparece después de que un hígado alvarado estado inflamado por años. La cirrosis es causada la mayoría de las veces por el consumo excesivo de alcohol o por lam infección por hepatitis. Roman también hay otras causas. Estas incluyen medicamentos y [place] grasa en el hígado. También puede ser causada por afecciones hereditarias y otros trastornos. En algunos casos, la causa no se encuentra. El tratamiento no puede reparar completamente el daño hepático. Roman magalys vez pueda retardar o prevenir más daño si no samara alcohol ni [place] drogas que dañen el hígado. La atención de seguimiento es lam parte clave de tam tratamiento y seguridad. Asegúrese de hacer y acudir a todas las citas, y llame a tam médico si está teniendo problemas. También es lam buena idea saber los resultados de los exámenes y mantener lam lista de los medicamentos que bony. ¿Cómo puede cuidarse en el hogar? · No tai nada de alcohol. Puede dañarle el hígado. Hable con tam médico si necesita ayuda para dejar de beber alcohol. · Sea oneli con los medicamentos. [employer] medicamentos exactamente ofelia le fueron recetados. Llame a tam médico si vi estar teniendo un problema con tam medicamento. · Hable con tam médico antes de monika cualquier otro medicamento. Se incluyen medicamentos de venta izabella y productos herbarios. · Tenga cuidado al monika acetaminofén (Tylenol), ibuprofeno (Advil, Motrin) o naproxeno (Aleve). Estos a veces pueden causar más daño hepático. Hable con tam médico si no está seguro de cuáles medicamentos son seguros. · Si tam cirrosis hace que se acumule exceso de líquido en tam cuerpo, trate de no ingerir mucha sal. Use menos sal en la ortega y al cocinar.  No coma comida rápida ni refrigerios con mucha sal. El exceso de líquidos en el abdomen, las rosinas y Zhanna pecho puede causarle problemas graves. · Colabore con tam médico o dietista para asegurarse de come la cantidad [place] de carbohidratos, proteínas, grasa y sodio (sal). Es muy importante que elija los mejores alimentos para la sreekanth de tam hígado. · Si tam médico se lo recomienda, limite la cantidad de líquido que samara. · Si tam médico se lo recomienda, harjinder más ejercicio. Caminar es lam buena opción. Poco a poco, aumente la distancia que [employer]. Intente hacer por lo menos 30 minutos de ejercicio la mayoría de los días de la [place]. Quizás también quiera nadar, montar en bicicleta o hacer otras actividades. ¿Cuándo debe pedir ayuda? Llame al 911 en cualquier momento que considere que necesita atención de [place]. Por ejemplo, llame si:  ? · Se desmayó (perdió el conocimiento). ?Llame a tam médico ahora mismo o busque atención médica inmediata si:  ? · Se siente muy somnoliento o confuso. ? · Tiene nuevo dolor abdominal o tam dolor empeora. ? · Tiene fiebre. ? · Tiene un nuevo tinte amarillento o tameka es más intenso en la piel o en la parte christopher de los ojos. ? · Tiene cualquier tipo de sangrado (hemorragia) anormal, ofelia:  ¨ Hemorragias nasales. ¨ Sangrado vaginal que es diferente (más intenso, más frecuente, en un momento diferente del mes) del cual está acostumbrada. ¨ Heces sanguinolentas (con fernanda) o negruzcas, o sangrado rectal.  ¨ Orina sanguinolenta o de color solano. ?Preste especial atención a los cambios en tam sreekanth y asegúrese de comunicarse con tam médico si tiene cualquier problema. ¿Dónde puede encontrar más información en inglés? Jitendra Ramirez a http://constance-brian.info/. Escriba M412 en la búsqueda para aprender más acerca de \"Cirrosis: Instrucciones de cuidado - [ Cirrhosis: Care Instructions ]. \"  Revisado: [address] [place], 2017  Versión del contenido: 11.4  © 3763-1704 Healthwise, Incorporated.  Las instrucciones de cuidado fueron adaptadas bajo licencia por Good [facility] Connections (which disclaims liability or warranty for this information). Si usted tiene [place] afección médica o sobre estas instrucciones, siempre pregunte a tam profesional de sreekanth. [facility] Incorporated niega toda garantía o responsabilidad por tam uso de esta información.

## 2017-12-20 ENCOUNTER — TELEPHONE (OUTPATIENT)
Dept: FAMILY MEDICINE CLINIC | Age: 62
End: 2017-12-20

## 2017-12-20 NOTE — TELEPHONE ENCOUNTER
No rx needed, but she needs to limit use as much as possible given her cirrhosis. She can take occasionally and not daily. Limit to < 2g per day. She CANNOT drink alcohol while taking this and should not drink alcohol at baseline.

## 2017-12-20 NOTE — TELEPHONE ENCOUNTER
----- Message from Lalitha Butts RN sent at 12/20/2017  3:14 PM EST -----  Hi Dr Elizabeth Brooks, this pt calling saying the Pharmacy does not have her rx for Tylenol arthritis. I did not see in her chart where you or any of our providers have ordered that for her. She has seen Kurt Campos in the past, but I do not see any rx for it. Does she even need a rx for this?  [place]

## 2017-12-29 ENCOUNTER — PATIENT OUTREACH (OUTPATIENT)
Dept: FAMILY MEDICINE CLINIC | Age: 62
End: 2017-12-29

## 2017-12-29 NOTE — LETTER
1/2/2018 7:50 AM 
 
Ms. [facility] [address] [address] Mi nombre es [place], y soy tam enfermera. Porfavor de llamar me al [phone]. Suzan Drew RN, CMSRN  Nurse Navigator, [facility] Facilitator [address] [address] [place] [address], [address] [place], [place] [id number] [address] 
[phone] (w)  [phone]. (c)  [phone] (f) [email or web address]  [email or web address] Clay Odom RN

## 2018-01-02 NOTE — PROGRESS NOTES
12/29/2017 10:39 AM Phone (Outgoing) [address] [place] [address] () [phone] (H)      By Maryanne Whyte RN       12/29/2017 10:37 AM Phone (Juany Mahmood [facility])      Left Message       By Maryanne Whyte RN       12/29/2017 10:32 AM Phone (Outgoing) Mc Garcia (Self) [phone] (H)     Left Message       By Maryanne Whyte RN        Left messages with contact information and hours of operation. Left message with spouse on 12/29/17.

## 2018-01-08 ENCOUNTER — APPOINTMENT (OUTPATIENT)
Dept: GENERAL RADIOLOGY | Age: 63
DRG: 378 | End: 2018-01-08
Attending: EMERGENCY MEDICINE
Payer: SUBSIDIZED

## 2018-01-08 ENCOUNTER — HOSPITAL ENCOUNTER (INPATIENT)
Age: 63
LOS: 2 days | Discharge: HOME | DRG: 378 | End: 2018-01-10
Attending: EMERGENCY MEDICINE | Admitting: FAMILY MEDICINE
Payer: SUBSIDIZED

## 2018-01-08 ENCOUNTER — ANESTHESIA EVENT (OUTPATIENT)
Dept: ENDOSCOPY | Age: 63
DRG: 378 | End: 2018-01-08
Payer: SUBSIDIZED

## 2018-01-08 ENCOUNTER — ANESTHESIA (OUTPATIENT)
Dept: ENDOSCOPY | Age: 63
DRG: 378 | End: 2018-01-08
Payer: SUBSIDIZED

## 2018-01-08 DIAGNOSIS — K92.2 UPPER GI BLEED: Primary | ICD-10-CM

## 2018-01-08 LAB
ALBUMIN SERPL-MCNC: 2.7 G/DL (ref 3.5–5)
ALBUMIN/GLOB SERPL: 0.7 {RATIO} (ref 1.1–2.2)
ALP SERPL-CCNC: 137 U/L (ref 45–117)
ALT SERPL-CCNC: 45 U/L (ref 12–78)
ANION GAP BLD CALC-SCNC: 13 MMOL/L (ref 5–15)
ANION GAP SERPL CALC-SCNC: 8 MMOL/L (ref 5–15)
ANION GAP SERPL CALC-SCNC: 9 MMOL/L (ref 5–15)
AST SERPL-CCNC: 35 U/L (ref 15–37)
BASOPHILS # BLD: 0 K/UL (ref 0–0.1)
BASOPHILS # BLD: 0 K/UL (ref 0–0.1)
BASOPHILS NFR BLD: 0 % (ref 0–1)
BASOPHILS NFR BLD: 0 % (ref 0–1)
BILIRUB SERPL-MCNC: 0.9 MG/DL (ref 0.2–1)
BUN BLD-MCNC: 26 MG/DL (ref 9–20)
BUN SERPL-MCNC: 20 MG/DL (ref 6–20)
BUN SERPL-MCNC: 22 MG/DL (ref 6–20)
BUN/CREAT SERPL: 44 (ref 12–20)
BUN/CREAT SERPL: 50 (ref 12–20)
CA-I BLD-MCNC: 1.09 MMOL/L (ref 1.12–1.32)
CALCIUM SERPL-MCNC: 7.9 MG/DL (ref 8.5–10.1)
CALCIUM SERPL-MCNC: 7.9 MG/DL (ref 8.5–10.1)
CHLORIDE BLD-SCNC: 112 MMOL/L (ref 98–107)
CHLORIDE SERPL-SCNC: 115 MMOL/L (ref 97–108)
CHLORIDE SERPL-SCNC: 115 MMOL/L (ref 97–108)
CO2 BLD-SCNC: 24 MMOL/L (ref 21–32)
CO2 SERPL-SCNC: 22 MMOL/L (ref 21–32)
CO2 SERPL-SCNC: 23 MMOL/L (ref 21–32)
CREAT BLD-MCNC: 0.4 MG/DL (ref 0.6–1.3)
CREAT SERPL-MCNC: 0.44 MG/DL (ref 0.55–1.02)
CREAT SERPL-MCNC: 0.45 MG/DL (ref 0.55–1.02)
DIFFERENTIAL METHOD BLD: ABNORMAL
EOSINOPHIL # BLD: 0 K/UL (ref 0–0.4)
EOSINOPHIL # BLD: 0 K/UL (ref 0–0.4)
EOSINOPHIL NFR BLD: 0 % (ref 0–7)
EOSINOPHIL NFR BLD: 1 % (ref 0–7)
ERYTHROCYTE [DISTWIDTH] IN BLOOD BY AUTOMATED COUNT: 14 % (ref 11.5–14.5)
ERYTHROCYTE [DISTWIDTH] IN BLOOD BY AUTOMATED COUNT: 14.2 % (ref 11.5–14.5)
GLOBULIN SER CALC-MCNC: 3.7 G/DL (ref 2–4)
GLUCOSE BLD-MCNC: 141 MG/DL (ref 65–100)
GLUCOSE SERPL-MCNC: 119 MG/DL (ref 65–100)
GLUCOSE SERPL-MCNC: 135 MG/DL (ref 65–100)
HCT VFR BLD AUTO: 27.2 % (ref 35–47)
HCT VFR BLD AUTO: 29.8 % (ref 35–47)
HCT VFR BLD CALC: 27 % (ref 35–47)
HGB BLD-MCNC: 8.5 G/DL (ref 11.5–16)
HGB BLD-MCNC: 9.2 G/DL (ref 11.5–16)
HGB BLD-MCNC: 9.2 GM/DL (ref 11.5–16)
INR BLD: 1.2 (ref 0.9–1.2)
INR PPP: 1.3 (ref 0.9–1.1)
LYMPHOCYTES # BLD: 0.5 K/UL (ref 0.8–3.5)
LYMPHOCYTES # BLD: 0.5 K/UL (ref 0.8–3.5)
LYMPHOCYTES NFR BLD: 13 % (ref 12–49)
LYMPHOCYTES NFR BLD: 17 % (ref 12–49)
MCH RBC QN AUTO: 30.5 PG (ref 26–34)
MCH RBC QN AUTO: 30.7 PG (ref 26–34)
MCHC RBC AUTO-ENTMCNC: 30.9 G/DL (ref 30–36.5)
MCHC RBC AUTO-ENTMCNC: 31.3 G/DL (ref 30–36.5)
MCV RBC AUTO: 98.2 FL (ref 80–99)
MCV RBC AUTO: 98.7 FL (ref 80–99)
MONOCYTES # BLD: 0.1 K/UL (ref 0–1)
MONOCYTES # BLD: 0.2 K/UL (ref 0–1)
MONOCYTES NFR BLD: 4 % (ref 5–13)
MONOCYTES NFR BLD: 6 % (ref 5–13)
NEUTS SEG # BLD: 2.2 K/UL (ref 1.8–8)
NEUTS SEG # BLD: 3.2 K/UL (ref 1.8–8)
NEUTS SEG NFR BLD: 79 % (ref 32–75)
NEUTS SEG NFR BLD: 80 % (ref 32–75)
PLATELET # BLD AUTO: 50 K/UL (ref 150–400)
PLATELET # BLD AUTO: 56 K/UL (ref 150–400)
POTASSIUM BLD-SCNC: 4.9 MMOL/L (ref 3.5–5.1)
POTASSIUM SERPL-SCNC: 4.1 MMOL/L (ref 3.5–5.1)
POTASSIUM SERPL-SCNC: 4.4 MMOL/L (ref 3.5–5.1)
PROT SERPL-MCNC: 6.4 G/DL (ref 6.4–8.2)
PROTHROMBIN TIME: 12.8 SEC (ref 9–11.1)
RBC # BLD AUTO: 2.77 M/UL (ref 3.8–5.2)
RBC # BLD AUTO: 3.02 M/UL (ref 3.8–5.2)
RBC MORPH BLD: ABNORMAL
RBC MORPH BLD: ABNORMAL
SERVICE CMNT-IMP: ABNORMAL
SODIUM BLD-SCNC: 143 MMOL/L (ref 136–145)
SODIUM SERPL-SCNC: 146 MMOL/L (ref 136–145)
SODIUM SERPL-SCNC: 146 MMOL/L (ref 136–145)
TROPONIN I SERPL-MCNC: <0.04 NG/ML
WBC # BLD AUTO: 2.8 K/UL (ref 3.6–11)
WBC # BLD AUTO: 3.9 K/UL (ref 3.6–11)

## 2018-01-08 PROCEDURE — 74011000258 HC RX REV CODE- 258: Performed by: HOSPITALIST

## 2018-01-08 PROCEDURE — 74011250636 HC RX REV CODE- 250/636

## 2018-01-08 PROCEDURE — 74011000250 HC RX REV CODE- 250

## 2018-01-08 PROCEDURE — 86902 BLOOD TYPE ANTIGEN DONOR EA: CPT | Performed by: EMERGENCY MEDICINE

## 2018-01-08 PROCEDURE — 85610 PROTHROMBIN TIME: CPT

## 2018-01-08 PROCEDURE — 80053 COMPREHEN METABOLIC PANEL: CPT | Performed by: EMERGENCY MEDICINE

## 2018-01-08 PROCEDURE — 96365 THER/PROPH/DIAG IV INF INIT: CPT

## 2018-01-08 PROCEDURE — 76040000019: Performed by: INTERNAL MEDICINE

## 2018-01-08 PROCEDURE — C9113 INJ PANTOPRAZOLE SODIUM, VIA: HCPCS | Performed by: EMERGENCY MEDICINE

## 2018-01-08 PROCEDURE — 85610 PROTHROMBIN TIME: CPT | Performed by: FAMILY MEDICINE

## 2018-01-08 PROCEDURE — 71045 X-RAY EXAM CHEST 1 VIEW: CPT

## 2018-01-08 PROCEDURE — 99285 EMERGENCY DEPT VISIT HI MDM: CPT

## 2018-01-08 PROCEDURE — 85025 COMPLETE CBC W/AUTO DIFF WBC: CPT | Performed by: EMERGENCY MEDICINE

## 2018-01-08 PROCEDURE — 74011250636 HC RX REV CODE- 250/636: Performed by: EMERGENCY MEDICINE

## 2018-01-08 PROCEDURE — 74011250636 HC RX REV CODE- 250/636: Performed by: FAMILY MEDICINE

## 2018-01-08 PROCEDURE — 77030032490 HC SLV COMPR SCD KNE COVD -B

## 2018-01-08 PROCEDURE — 86921 COMPATIBILITY TEST INCUBATE: CPT | Performed by: EMERGENCY MEDICINE

## 2018-01-08 PROCEDURE — 80048 BASIC METABOLIC PNL TOTAL CA: CPT | Performed by: FAMILY MEDICINE

## 2018-01-08 PROCEDURE — 65660000000 HC RM CCU STEPDOWN

## 2018-01-08 PROCEDURE — 74011250637 HC RX REV CODE- 250/637: Performed by: NURSE PRACTITIONER

## 2018-01-08 PROCEDURE — 80047 BASIC METABLC PNL IONIZED CA: CPT

## 2018-01-08 PROCEDURE — 96375 TX/PRO/DX INJ NEW DRUG ADDON: CPT

## 2018-01-08 PROCEDURE — 76060000031 HC ANESTHESIA FIRST 0.5 HR: Performed by: INTERNAL MEDICINE

## 2018-01-08 PROCEDURE — 93005 ELECTROCARDIOGRAM TRACING: CPT

## 2018-01-08 PROCEDURE — 86900 BLOOD TYPING SEROLOGIC ABO: CPT | Performed by: EMERGENCY MEDICINE

## 2018-01-08 PROCEDURE — 86922 COMPATIBILITY TEST ANTIGLOB: CPT | Performed by: EMERGENCY MEDICINE

## 2018-01-08 PROCEDURE — 74011000250 HC RX REV CODE- 250: Performed by: EMERGENCY MEDICINE

## 2018-01-08 PROCEDURE — 74011250637 HC RX REV CODE- 250/637: Performed by: HOSPITALIST

## 2018-01-08 PROCEDURE — 84484 ASSAY OF TROPONIN QUANT: CPT | Performed by: EMERGENCY MEDICINE

## 2018-01-08 PROCEDURE — 86870 RBC ANTIBODY IDENTIFICATION: CPT | Performed by: EMERGENCY MEDICINE

## 2018-01-08 PROCEDURE — 0DJ08ZZ INSPECTION OF UPPER INTESTINAL TRACT, VIA NATURAL OR ARTIFICIAL OPENING ENDOSCOPIC: ICD-10-PCS | Performed by: INTERNAL MEDICINE

## 2018-01-08 PROCEDURE — 36415 COLL VENOUS BLD VENIPUNCTURE: CPT | Performed by: FAMILY MEDICINE

## 2018-01-08 PROCEDURE — 86920 COMPATIBILITY TEST SPIN: CPT | Performed by: EMERGENCY MEDICINE

## 2018-01-08 RX ORDER — PROPRANOLOL HYDROCHLORIDE 10 MG/1
10 TABLET ORAL 3 TIMES DAILY
Status: DISCONTINUED | OUTPATIENT
Start: 2018-01-08 | End: 2018-01-10 | Stop reason: HOSPADM

## 2018-01-08 RX ORDER — PROPOFOL 10 MG/ML
INJECTION, EMULSION INTRAVENOUS AS NEEDED
Status: DISCONTINUED | OUTPATIENT
Start: 2018-01-08 | End: 2018-01-08 | Stop reason: HOSPADM

## 2018-01-08 RX ORDER — SODIUM CHLORIDE 9 MG/ML
INJECTION, SOLUTION INTRAVENOUS
Status: DISCONTINUED | OUTPATIENT
Start: 2018-01-08 | End: 2018-01-08 | Stop reason: HOSPADM

## 2018-01-08 RX ORDER — EPINEPHRINE 0.1 MG/ML
1 INJECTION INTRACARDIAC; INTRAVENOUS
Status: DISCONTINUED | OUTPATIENT
Start: 2018-01-08 | End: 2018-01-08 | Stop reason: HOSPADM

## 2018-01-08 RX ORDER — SODIUM CHLORIDE 9 MG/ML
100 INJECTION, SOLUTION INTRAVENOUS CONTINUOUS
Status: DISPENSED | OUTPATIENT
Start: 2018-01-08 | End: 2018-01-08

## 2018-01-08 RX ORDER — SODIUM CHLORIDE 0.9 % (FLUSH) 0.9 %
5-10 SYRINGE (ML) INJECTION EVERY 8 HOURS
Status: DISCONTINUED | OUTPATIENT
Start: 2018-01-08 | End: 2018-01-10 | Stop reason: HOSPADM

## 2018-01-08 RX ORDER — DEXTROMETHORPHAN/PSEUDOEPHED 2.5-7.5/.8
1.2 DROPS ORAL
Status: DISCONTINUED | OUTPATIENT
Start: 2018-01-08 | End: 2018-01-08 | Stop reason: HOSPADM

## 2018-01-08 RX ORDER — SODIUM CHLORIDE 450 MG/100ML
50 INJECTION, SOLUTION INTRAVENOUS CONTINUOUS
Status: DISCONTINUED | OUTPATIENT
Start: 2018-01-08 | End: 2018-01-10 | Stop reason: HOSPADM

## 2018-01-08 RX ORDER — LISINOPRIL 20 MG/1
20 TABLET ORAL DAILY
Status: DISCONTINUED | OUTPATIENT
Start: 2018-01-08 | End: 2018-01-10 | Stop reason: HOSPADM

## 2018-01-08 RX ORDER — NALOXONE HYDROCHLORIDE 0.4 MG/ML
0.4 INJECTION, SOLUTION INTRAMUSCULAR; INTRAVENOUS; SUBCUTANEOUS
Status: DISCONTINUED | OUTPATIENT
Start: 2018-01-08 | End: 2018-01-08 | Stop reason: HOSPADM

## 2018-01-08 RX ORDER — SODIUM CHLORIDE 0.9 % (FLUSH) 0.9 %
5-10 SYRINGE (ML) INJECTION AS NEEDED
Status: ACTIVE | OUTPATIENT
Start: 2018-01-08 | End: 2018-01-08

## 2018-01-08 RX ORDER — SODIUM CHLORIDE 9 MG/ML
100 INJECTION, SOLUTION INTRAVENOUS CONTINUOUS
Status: DISCONTINUED | OUTPATIENT
Start: 2018-01-08 | End: 2018-01-08

## 2018-01-08 RX ORDER — OCTREOTIDE ACETATE 50 UG/ML
50 INJECTION, SOLUTION INTRAVENOUS; SUBCUTANEOUS ONCE
Status: COMPLETED | OUTPATIENT
Start: 2018-01-08 | End: 2018-01-08

## 2018-01-08 RX ORDER — ATROPINE SULFATE 0.1 MG/ML
0.5 INJECTION INTRAVENOUS
Status: DISCONTINUED | OUTPATIENT
Start: 2018-01-08 | End: 2018-01-08 | Stop reason: HOSPADM

## 2018-01-08 RX ORDER — SUCRALFATE 1 G/10ML
1 SUSPENSION ORAL EVERY 6 HOURS
Status: DISCONTINUED | OUTPATIENT
Start: 2018-01-08 | End: 2018-01-10 | Stop reason: HOSPADM

## 2018-01-08 RX ORDER — ONDANSETRON 2 MG/ML
8 INJECTION INTRAMUSCULAR; INTRAVENOUS
Status: COMPLETED | OUTPATIENT
Start: 2018-01-08 | End: 2018-01-08

## 2018-01-08 RX ORDER — DIPHENHYDRAMINE HYDROCHLORIDE 50 MG/ML
50 INJECTION, SOLUTION INTRAMUSCULAR; INTRAVENOUS ONCE
Status: DISCONTINUED | OUTPATIENT
Start: 2018-01-08 | End: 2018-01-08 | Stop reason: HOSPADM

## 2018-01-08 RX ORDER — SODIUM CHLORIDE 0.9 % (FLUSH) 0.9 %
5-10 SYRINGE (ML) INJECTION AS NEEDED
Status: DISCONTINUED | OUTPATIENT
Start: 2018-01-08 | End: 2018-01-10 | Stop reason: HOSPADM

## 2018-01-08 RX ORDER — LIDOCAINE HYDROCHLORIDE 20 MG/ML
INJECTION, SOLUTION EPIDURAL; INFILTRATION; INTRACAUDAL; PERINEURAL AS NEEDED
Status: DISCONTINUED | OUTPATIENT
Start: 2018-01-08 | End: 2018-01-08 | Stop reason: HOSPADM

## 2018-01-08 RX ORDER — FUROSEMIDE 40 MG/1
40 TABLET ORAL DAILY
Status: DISCONTINUED | OUTPATIENT
Start: 2018-01-08 | End: 2018-01-10 | Stop reason: HOSPADM

## 2018-01-08 RX ORDER — FENTANYL CITRATE 50 UG/ML
100 INJECTION, SOLUTION INTRAMUSCULAR; INTRAVENOUS
Status: DISCONTINUED | OUTPATIENT
Start: 2018-01-08 | End: 2018-01-08 | Stop reason: HOSPADM

## 2018-01-08 RX ORDER — ONDANSETRON 2 MG/ML
INJECTION INTRAMUSCULAR; INTRAVENOUS
Status: COMPLETED
Start: 2018-01-08 | End: 2018-01-08

## 2018-01-08 RX ORDER — FLUMAZENIL 0.1 MG/ML
0.2 INJECTION INTRAVENOUS
Status: DISCONTINUED | OUTPATIENT
Start: 2018-01-08 | End: 2018-01-08 | Stop reason: HOSPADM

## 2018-01-08 RX ORDER — SPIRONOLACTONE 25 MG/1
100 TABLET ORAL DAILY
Status: DISCONTINUED | OUTPATIENT
Start: 2018-01-08 | End: 2018-01-10 | Stop reason: HOSPADM

## 2018-01-08 RX ORDER — SODIUM CHLORIDE 0.9 % (FLUSH) 0.9 %
5-10 SYRINGE (ML) INJECTION EVERY 8 HOURS
Status: ACTIVE | OUTPATIENT
Start: 2018-01-08 | End: 2018-01-08

## 2018-01-08 RX ORDER — PANTOPRAZOLE SODIUM 40 MG/1
40 TABLET, DELAYED RELEASE ORAL
Status: DISCONTINUED | OUTPATIENT
Start: 2018-01-08 | End: 2018-01-10 | Stop reason: HOSPADM

## 2018-01-08 RX ORDER — MIDAZOLAM HYDROCHLORIDE 1 MG/ML
.25-1 INJECTION, SOLUTION INTRAMUSCULAR; INTRAVENOUS
Status: DISCONTINUED | OUTPATIENT
Start: 2018-01-08 | End: 2018-01-08 | Stop reason: HOSPADM

## 2018-01-08 RX ADMIN — SODIUM CHLORIDE 1000 ML: 900 INJECTION, SOLUTION INTRAVENOUS at 01:49

## 2018-01-08 RX ADMIN — SPIRONOLACTONE 100 MG: 25 TABLET, FILM COATED ORAL at 12:49

## 2018-01-08 RX ADMIN — ONDANSETRON 8 MG: 2 INJECTION INTRAMUSCULAR; INTRAVENOUS at 01:41

## 2018-01-08 RX ADMIN — SODIUM CHLORIDE: 9 INJECTION, SOLUTION INTRAVENOUS at 10:38

## 2018-01-08 RX ADMIN — SUCRALFATE 1 G: 1 SUSPENSION ORAL at 12:50

## 2018-01-08 RX ADMIN — LISINOPRIL 20 MG: 20 TABLET ORAL at 12:49

## 2018-01-08 RX ADMIN — PROPOFOL 50 MG: 10 INJECTION, EMULSION INTRAVENOUS at 10:47

## 2018-01-08 RX ADMIN — PROPOFOL 50 MG: 10 INJECTION, EMULSION INTRAVENOUS at 10:43

## 2018-01-08 RX ADMIN — SODIUM CHLORIDE 80 MG: 9 INJECTION INTRAMUSCULAR; INTRAVENOUS; SUBCUTANEOUS at 01:49

## 2018-01-08 RX ADMIN — FUROSEMIDE 40 MG: 40 TABLET ORAL at 12:49

## 2018-01-08 RX ADMIN — OCTREOTIDE ACETATE 50 MCG/HR: 500 INJECTION, SOLUTION INTRAVENOUS; SUBCUTANEOUS at 02:02

## 2018-01-08 RX ADMIN — PROPOFOL 50 MG: 10 INJECTION, EMULSION INTRAVENOUS at 10:45

## 2018-01-08 RX ADMIN — SODIUM CHLORIDE 50 ML/HR: 450 INJECTION, SOLUTION INTRAVENOUS at 11:41

## 2018-01-08 RX ADMIN — LIDOCAINE HYDROCHLORIDE 40 MG: 20 INJECTION, SOLUTION EPIDURAL; INFILTRATION; INTRACAUDAL; PERINEURAL at 10:43

## 2018-01-08 RX ADMIN — PROPOFOL 50 MG: 10 INJECTION, EMULSION INTRAVENOUS at 10:50

## 2018-01-08 RX ADMIN — SUCRALFATE 1 G: 1 SUSPENSION ORAL at 17:46

## 2018-01-08 RX ADMIN — PROPOFOL 20 MG: 10 INJECTION, EMULSION INTRAVENOUS at 10:51

## 2018-01-08 RX ADMIN — SUCRALFATE 1 G: 1 SUSPENSION ORAL at 23:49

## 2018-01-08 RX ADMIN — PANTOPRAZOLE SODIUM 40 MG: 40 TABLET, DELAYED RELEASE ORAL at 16:52

## 2018-01-08 RX ADMIN — PROPRANOLOL HYDROCHLORIDE 10 MG: 10 TABLET ORAL at 16:52

## 2018-01-08 RX ADMIN — SODIUM CHLORIDE 100 ML/HR: 900 INJECTION, SOLUTION INTRAVENOUS at 08:35

## 2018-01-08 RX ADMIN — Medication 10 ML: at 21:54

## 2018-01-08 RX ADMIN — OCTREOTIDE ACETATE 50 MCG: 50 INJECTION, SOLUTION INTRAVENOUS; SUBCUTANEOUS at 02:03

## 2018-01-08 NOTE — PROGRESS NOTES
TRANSFER - IN REPORT:    Verbal report received from WELLINGTON Alvarez(name) on Umesh Phillips  being received from ED (unit) for routine progression of care      Report consisted of patients Situation, Background, Assessment and   Recommendations(SBAR). Information from the following report(s) SBAR, Kardex, ED Summary, Intake/Output, MAR, Recent Results, Med Rec Status and Cardiac Rhythm NSR was reviewed with the receiving nurse. Opportunity for questions and clarification was provided. Assessment completed upon patients arrival to unit and care assumed. Primary Nurse Home Fish RN and WELLINGTON Kruse performed a dual skin assessment on this patient No impairment noted  Joey score is 21    0430- Assumed patient care. Patient alert and oriented x 4. LOUIS & follows commands. No complaints of nausea at this time. No pain. VSS. Requesting to use the bedpan to void. Skin intact. Has sticky medicated patches all over her, when questioned about it she states that it is for her arthritis and is something she purchases over the counter--patient unable to recall the name. Bedside and Verbal shift change report given to Janet Camacho RN (oncoming nurse) by Mckinley Vega RN (offgoing nurse). Report included the following information SBAR, Kardex, ED Summary, Intake/Output, MAR, Recent Results, Med Rec Status and Cardiac Rhythm NSR.

## 2018-01-08 NOTE — ED PROVIDER NOTES
Patient is a 58 y.o. female presenting with chest pain and vomiting. Chest Pain (Angina)    Associated symptoms include dizziness, nausea and vomiting. Pertinent negatives include no abdominal pain, no fever and no shortness of breath. Vomiting    Pertinent negatives include no fever and no abdominal pain. 58year old female with h/o esophageal varices (althought patient denies this or h/o upper gi bleeding) , cirrhosis from Hep C, arthritis, presents with acute onset of vomiting blood with chest pain. Started around 11pm. No blood thinners. Feels weak and dizzy. No abdominal pain. No diarrhea. No recent dark/black or bloody stools. Does not take NSAIDs/aspirin. Followed by Dr. Landy Kaur. Dr. Landy Kaur note: The patient is well known to me and regularly cared for at [address] [address]. 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  She was found to have chronic HCV and cirrhosis in 2017 when she was hospitalized at [facility] for ascites.  Risk factors for HCV are blood transfusions as a child in [address] [place], [place] [address] has resolved with Diuretics and paracentesis.  She has not developed edema or hepatic encephalopathy.  EGD in 2017 demonstrated multiple large esophageal varices which were banded. Past Medical History:   Diagnosis Date    Arthritis     Shoulders, Hips, Knees, Ankles, Back    Esophageal varices (HCC)     Liver disease 2017    Cirrohis    Neurological disorder        Past Surgical History:   Procedure Laterality Date    HX CHOLECYSTECTOMY      HX GI      upper GI    HX GYN               History reviewed. No pertinent family history. Social History     Social History    Marital status:      Spouse name: N/A    Number of children: N/A    Years of education: N/A     Occupational History    Not on file.      Social History Main Topics    Smoking status: Former Smoker     Quit date: 1996    Smokeless tobacco: Never Used   [address] [place] [address] Alcohol use No    Drug use: No    Sexual activity: Not on file     Other Topics Concern    Not on file     Social History Narrative         ALLERGIES: Morphine    Review of Systems   Constitutional: Negative for fever. HENT: Negative for congestion. Eyes: Negative for visual disturbance. Respiratory: Positive for chest tightness. Negative for shortness of breath. Cardiovascular: Positive for chest pain. Gastrointestinal: Positive for nausea and vomiting. Negative for abdominal pain and blood in stool. Endocrine: Negative for polyuria. Genitourinary: Negative for dysuria. Musculoskeletal: Negative for gait problem. Neurological: Positive for dizziness. Psychiatric/Behavioral: Negative for dysphoric mood. Vitals:    01/08/18 0108   BP: 181/77   Pulse: 77   Resp: 18   Temp: 98.4 °F (36.9 °C)   SpO2: 99%   Weight: 93 kg (205 lb)   Height: 5' (1.524 m)            Physical Exam   Constitutional: She is oriented to person, place, and time. She appears well-developed and well-nourished. No distress. actively vomiting bright red blood with clots. HENT:   Head: Normocephalic and atraumatic. Mouth/Throat: Oropharynx is clear and moist. No oropharyngeal exudate. Eyes: Conjunctivae and EOM are normal. Pupils are equal, round, and reactive to light. Right eye exhibits no discharge. Left eye exhibits no discharge. No scleral icterus. Neck: Normal range of motion. Neck supple. No JVD present. Cardiovascular: Normal rate, regular rhythm, normal heart sounds and intact distal pulses. Exam reveals no gallop and no friction rub. No murmur heard. Pulmonary/Chest: Effort normal and breath sounds normal. No stridor. No respiratory distress. She has no wheezes. She has no rales. She exhibits no tenderness. Abdominal: Soft. Bowel sounds are normal. She exhibits no distension and no mass. There is no tenderness. There is no rebound and no guarding. Musculoskeletal: Normal range of motion. She exhibits no edema or tenderness. Neurological: She is alert and oriented to person, place, and time. She has normal reflexes. No cranial nerve deficit. She exhibits normal muscle tone. Coordination normal.   Skin: Skin is warm and dry. No rash noted. No erythema. Psychiatric: She has a normal mood and affect. Her behavior is normal. Judgment and thought content normal.        MDM  Number of Diagnoses or Management Options  Upper GI bleed:   Critical Care  Total time providing critical care: 30-74 minutes  60 minutes  ED Course       Procedures         ED EKG interpretation:  Rhythm: normal sinus rhythm; and regular . Rate (approx.): 78; Axis: normal; P wave: normal; QRS interval: normal ; ST/T wave: non-specific changes; . This EKG was interpreted by Clive Calle MD,ED Provider. Protonix 80mg IV, Octreotide gtt. Spoke with dr. Jones Bowman and Dr. Chely Leos. No further recommnedations. Admit to ICU/hospitalist and they will scope in am.     Clive Calle MD  3:07 AM  Patient states she is feeling much better. Spoke to the hospitalist- will admit.

## 2018-01-08 NOTE — PROGRESS NOTES
Spiritual Care Partner Volunteer visited patient in [address] [facility] on 1/8/2018. Pt requests  visit. Tomorrow's duty  will be notified of same.   Documented by:  Chaplain Mandujano MDiv, MS, [address] ([id number])

## 2018-01-08 NOTE — PROGRESS NOTES
0730- Bedside and Verbal shift change report given to Miley Francisco 90 (oncoming nurse) by Poppy Juárez RN (offgoing nurse). Report included the following information SBAR, Kardex, ED Summary, Intake/Output, MAR, Recent Results and Cardiac Rhythm NSR.   1000- Report given to KESHIA Dunbar RN, pt down to endoscopy  1120- Report received from Fraser Boxer RN. Pt returned from endoscopy on stretcher  1930- Bedside and Verbal shift change report given to [address] [place] [address],[address] (oncoming nurse) by Marcela Horne RN (offgoing nurse). Report included the following information SBAR, Kardex, ED Summary, Procedure Summary, Intake/Output, MAR, Recent Results and Cardiac Rhythm NSR.

## 2018-01-08 NOTE — ANESTHESIA PREPROCEDURE EVALUATION
Anesthetic History   No history of anesthetic complications            Review of Systems / Medical History  Patient summary reviewed, nursing notes reviewed and pertinent labs reviewed    Pulmonary  Within defined limits                 Neuro/Psych   Within defined limits           Cardiovascular    Hypertension              Exercise tolerance: >4 METS     GI/Hepatic/Renal       Hepatitis: type C    Liver disease    Comments: Gi bleed  varicies  cirrhosis Endo/Other        Morbid obesity and arthritis     Other Findings            Physical Exam    Airway  Mallampati: III  TM Distance: 4 - 6 cm  Neck ROM: normal range of motion   Mouth opening: Normal     Cardiovascular    Rhythm: regular  Rate: normal         Dental  No notable dental hx       Pulmonary  Breath sounds clear to auscultation               Abdominal         Other Findings            Anesthetic Plan    ASA: 3  Anesthesia type: MAC          Induction: Intravenous  Anesthetic plan and risks discussed with: Patient
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
## 2018-01-08 NOTE — PROGRESS NOTES
Intensivist    Pt with hep c cirrhosis admitted with weakness and nausea and vomiting with blood tinged vomitus.   Admitted to ICU and started on octreotide  To have EGD this am    Pt is hemodynamically stable  Hgb 9.2 to 8.5    For EGD and pending findings on egd can probably move out of ICU after that    Sadie Ruff MD

## 2018-01-08 NOTE — PROGRESS NOTES

## 2018-01-08 NOTE — ED NOTES
Patient vomited bright red blood with clots while trying to obtain chest XR; est blood loss is 500mL; MD made aware, patient given zofran for the nausea

## 2018-01-08 NOTE — H&P
H&P [facility]. Job# [id number].     Diagnosis    GI bleed (K92.2)    Hematemesis/vomiting blood (K92.0)    Acute chest pain (R07.9)    Hepatitis C, chronic (B18.2)    Cirrhosis (K74.60)    Dizziness (R42)    Anemia (D64.9)

## 2018-01-08 NOTE — ED TRIAGE NOTES
TRIAGE NOTE: Patient arrived from home with c/o vomiting dark blood that started this evening, X2 episodes. \"i also am having chest pain and shortness of breath. \" +dizziness. +nausea.

## 2018-01-08 NOTE — ANESTHESIA POSTPROCEDURE EVALUATION
Post-Anesthesia Evaluation and Assessment    Patient: Yeny Sheldon MRN: [id number]  SSN: [id number]    YOB: 1955  Age: 58 y.o. Sex: female       Cardiovascular Function/Vital Signs  Visit Vitals    /64    Pulse 88    Temp 37.1 °C (98.8 °F)    Resp 13    Ht 5' (1.524 m)    Wt 90.6 kg (199 lb 11.8 oz)    SpO2 92%    BMI 39.01 kg/m2       Patient is status post MAC anesthesia for Procedure(s):  ESOPHAGOGASTRODUODENOSCOPY (EGD). Nausea/Vomiting: None    Postoperative hydration reviewed and adequate. Pain:  Pain Scale 1: Numeric (0 - 10) (01/08/18 1013)  Pain Intensity 1: 0 (01/08/18 1013)   Managed    Neurological Status:   Neuro  Neurologic State: Alert (01/08/18 0800)  Orientation Level: Oriented X4 (01/08/18 0800)  Cognition: Follows commands; Appropriate decision making (01/08/18 0800)  Speech: Clear (01/08/18 0800)  Assessment L Pupil: Brisk;Round (01/08/18 0430)  Size L Pupil (mm): 3 (01/08/18 0430)  Assessment R Pupil: Brisk;Round (01/08/18 0430)  Size R Pupil (mm): 3 (01/08/18 0430)  LUE Motor Response: Purposeful;Spontaneous  (01/08/18 0800)  LLE Motor Response: Purposeful;Spontaneous  (01/08/18 0800)  RUE Motor Response: Purposeful;Spontaneous  (01/08/18 0800)  RLE Motor Response: Purposeful;Spontaneous  (01/08/18 0800)   At baseline    Mental Status and Level of Consciousness: Arousable    Pulmonary Status:   O2 Device: CO2 nasal cannula (01/08/18 1055)   Adequate oxygenation and airway patent    Complications related to anesthesia: None    Post-anesthesia assessment completed.  No concerns    Signed By: Hayden Still MD     January 8, 2018

## 2018-01-08 NOTE — ROUTINE PROCESS
Merissa Dooley  1955  [id number]    Situation:  Verbal report received from: Malcolm Larson  Procedure: Procedure(s):  ESOPHAGOGASTRODUODENOSCOPY (EGD)    Background:    Preoperative diagnosis: GI Bleed  Postoperative diagnosis: 1.- Gastric Erosions  2.- Low Grade Esophageal Varices    :  Dr. Robe Saenz  Assistant(s): Endoscopy Technician-1: Sb Bo IV  Endoscopy RN-1: Kat Medellin RN    Specimens: * No specimens in log *  H. Pylori  no    Assessment:  Intra-procedure medications     Anesthesia gave intra-procedure sedation and medications, see anesthesia flow sheet yes    Intravenous fluids: NS@ KVO     Vital signs stable     Abdominal assessment: round and soft     Recommendation:  Discharge patient per MD order.   Return to [address] [place] [address] or Friend   Permission to share finding with family or friend yes

## 2018-01-08 NOTE — PROGRESS NOTES
TRANSFER - OUT REPORT:    Verbal report given to víctor Amaya(name) on Willy Martínez  being transferred to ICU(unit) for routine post - op       Report consisted of patients Situation, Background, Assessment and   Recommendations(SBAR). Information from the following report(s) SBAR was reviewed with the receiving nurse. Lines:   Peripheral IV 01/08/18 Left Arm (Active)   Site Assessment Clean, dry, & intact 1/8/2018  8:00 AM   Phlebitis Assessment 0 1/8/2018  8:00 AM   Infiltration Assessment 0 1/8/2018  8:00 AM   Dressing Status Clean, dry, & intact 1/8/2018  8:00 AM   Dressing Type Transparent;Tape 1/8/2018  8:00 AM   Hub Color/Line Status Pink;Capped 1/8/2018  8:00 AM   Action Taken Open ports on tubing capped 1/8/2018  4:30 AM   Alcohol Cap Used Yes 1/8/2018  8:00 AM       Peripheral IV 01/08/18 Right Hand (Active)   Site Assessment Clean, dry, & intact 1/8/2018  8:00 AM   Phlebitis Assessment 0 1/8/2018  8:00 AM   Infiltration Assessment 0 1/8/2018  8:00 AM   Dressing Status Clean, dry, & intact 1/8/2018  8:00 AM   Dressing Type Transparent;Tape 1/8/2018  8:00 AM   Hub Color/Line Status Pink; Infusing 1/8/2018  8:00 AM   Action Taken Open ports on tubing capped 1/8/2018  4:30 AM   Alcohol Cap Used Yes 1/8/2018  8:00 AM        Opportunity for questions and clarification was provided. Patient transported by transporter.

## 2018-01-08 NOTE — IP AVS SNAPSHOT
[address] 
[phone] Patient: Gustavo Rosen MRN: [id number] YCU:[birth year] A check carlos indicates which time of day the medication should be taken. My Medications START taking these medications Instructions Each Dose to Equal  
 Morning Noon Evening Bedtime  
 pantoprazole 40 mg tablet Commonly known as:  PROTONIX Your last dose was: Your next dose is: Take 1 Tab by mouth Before breakfast and dinner for 30 days. 40 mg CONTINUE taking these medications Instructions Each Dose to Equal  
 Morning Noon Evening Bedtime  
 ferrous sulfate 325 mg (65 mg iron) tablet Your last dose was: Your next dose is: Take 1 Tab by mouth two (2) times a day. 325 mg  
    
   
   
   
  
 furosemide 40 mg tablet Commonly known as:  LASIX Your last dose was: Your next dose is: Take 1 Tab by mouth daily. 40 mg  
    
   
   
   
  
 lactulose 20 gram/30 mL Soln solution Commonly known as:  Faye Galea Your last dose was: Your next dose is: Take 15 mL by mouth two (2) times a day. 10 g  
    
   
   
   
  
 lisinopril 20 mg tablet Commonly known as:  Loralie Stephenson Your last dose was: Your next dose is: Take 1 Tab by mouth daily. Venezuelan instructions 20 mg  
    
   
   
   
  
 propranolol 10 mg tablet Commonly known as:  INDERAL Your last dose was: Your next dose is: Take  by mouth three (3) times daily. spironolactone 100 mg tablet Commonly known as:  ALDACTONE Your last dose was: Your next dose is: Take 1 Tab by mouth daily. 100 mg  
    
   
   
   
  
 sucralfate 100 mg/mL suspension Commonly known as:  [place] Your last dose was: Your next dose is: Take 10 mL by mouth every six (6) hours. 1 g  
    
   
   
   
  
 traMADol 50 mg tablet Commonly known as:  ULTRAM  
   
Your last dose was: Your next dose is: Take 50 mg by mouth every six (6) hours as needed for Pain. 50 mg  
    
   
   
   
  
  
STOP taking these medications   
 metoprolol tartrate 25 mg tablet Commonly known as:  LOPRESSOR Where to Get Your Medications Information on where to get these meds will be given to you by the nurse or doctor. ! Ask your nurse or doctor about these medications  
  pantoprazole 40 mg tablet

## 2018-01-08 NOTE — PROGRESS NOTES
Hospitalist    Pt seen and examined  Pt admitted earlier by   Pt with Hep C Cirrhosis, admitted for hematemesis.   S/p EGD  H/H stable  D/c octreotide per GI  Clear for now   Resume home meds  Agree with A/p of Robert Nguyen MD  Internal Medicine  Mobile/text: [phone]

## 2018-01-08 NOTE — IP AVS SNAPSHOT
1837 [facility] Kate Matias 13 
[phone] Patient: Amanda Sims MRN: [id number] CET:5/2/9099 About your hospitalization You were admitted on:  January 8, 2018 You last received care in the:  [facility] 6S NEURO-SCI TELE You were discharged on:  January 10, 2018 Why you were hospitalized Your primary diagnosis was:  Gi Bleed Follow-up Information Follow up With Details Comments Contact Info Linda Holbrook MD In 1 week  [address] [place] [address] 
[phone] Yordy Diaz MD In 1 week  [address] [place] [facility] [place] [facility] [address] [place] [address] 
[phone] Your Scheduled Appointments Thursday February 22, 2018 11:00 AM EST PROCEDURE with MD Nilsa Cardon[address] ([facility]) [address] [place] [facility] [phone] Kate Matias 13  
[phone] Discharge Orders None A check carlos indicates which time of day the medication should be taken. My Medications START taking these medications Instructions Each Dose to Equal  
 Morning Noon Evening Bedtime  
 pantoprazole 40 mg tablet Commonly known as:  PROTONIX Your last dose was: Your next dose is: Take 1 Tab by mouth Before breakfast and dinner for 30 days. 40 mg CONTINUE taking these medications Instructions Each Dose to Equal  
 Morning Noon Evening Bedtime  
 ferrous sulfate 325 mg (65 mg iron) tablet Your last dose was: Your next dose is: Take 1 Tab by mouth two (2) times a day. 325 mg  
    
   
   
   
  
 furosemide 40 mg tablet Commonly known as:  LASIX Your last dose was: Your next dose is: Take 1 Tab by mouth daily. 40 mg  
    
   
   
   
  
 lactulose 20 gram/30 mL Soln solution Commonly known as:  Loyd Stacy  
   
 Your last dose was: Your next dose is: Take 15 mL by mouth two (2) times a day. 10 g  
    
   
   
   
  
 lisinopril 20 mg tablet Commonly known as:  Elaine Hernandez Your last dose was: Your next dose is: Take 1 Tab by mouth daily. Lao instructions 20 mg  
    
   
   
   
  
 propranolol 10 mg tablet Commonly known as:  INDERAL Your last dose was: Your next dose is: Take  by mouth three (3) times daily. spironolactone 100 mg tablet Commonly known as:  ALDACTONE Your last dose was: Your next dose is: Take 1 Tab by mouth daily. 100 mg  
    
   
   
   
  
 sucralfate 100 mg/mL suspension Commonly known as:  Lillie Gibes Your last dose was: Your next dose is: Take 10 mL by mouth every six (6) hours. 1 g  
    
   
   
   
  
 traMADol 50 mg tablet Commonly known as:  ULTRAM  
   
Your last dose was: Your next dose is: Take 50 mg by mouth every six (6) hours as needed for Pain. 50 mg  
    
   
   
   
  
  
STOP taking these medications   
 metoprolol tartrate 25 mg tablet Commonly known as:  LOPRESSOR Where to Get Your Medications Information on where to get these meds will be given to you by the nurse or doctor. ! Ask your nurse or doctor about these medications  
  pantoprazole 40 mg tablet Discharge Instructions Discharge Instructions PATIENT ID: Vanesa Jenkins MRN: [id number] YOB: 1955 DATE OF ADMISSION: 1/8/2018  1:06 AM   
DATE OF DISCHARGE: 1/10/2018 PRIMARY CARE PROVIDER: Nat Lomeli MD  
 
ATTENDING PHYSICIAN: Yvonne Gordon MD 
DISCHARGING PROVIDER: Yvonne Gordon MD   
To contact this individual call [phone] and ask the  to page. If unavailable ask to be transferred the Adult Hospitalist Department. DISCHARGE DIAGNOSES Gi bleed CONSULTATIONS: IP CONSULT TO GASTROENTEROLOGY 
IP CONSULT TO HEPATOLOGY 
IP CONSULT TO HOSPITALIST 
 
PROCEDURES/SURGERIES: Procedure(s): 
COLONOSCOPY PENDING TEST RESULTS:  
At the time of discharge the following test results are still pending: none FOLLOW UP APPOINTMENTS:  
Follow-up Information Follow up With Details Comments Contact Info Branidn Torres MD In 1 week  [address] 
[phone] Yordy Haile MD In 1 week  [address] [place] [address] [employer] [facility] [address] [place] [address] 
[phone] ADDITIONAL CARE RECOMMENDATIONS:  
Follow up with PMD as well as GI for outpatient colonoscopy DIET: Cardiac Diet ACTIVITY: Activity as tolerated DISCHARGE MEDICATIONS: 
 See Medication Reconciliation Form · It is important that you take the medication exactly as they are prescribed. · Keep your medication in the bottles provided by the pharmacist and keep a list of the medication names, dosages, and times to be taken in your wallet. · Do not take other medications without consulting your doctor. NOTIFY YOUR PHYSICIAN FOR ANY OF THE FOLLOWING:  
Fever over 101 degrees for 24 hours. Chest pain, shortness of breath, fever, chills, nausea, vomiting, diarrhea, change in mentation, falling, weakness, bleeding. Severe pain or pain not relieved by medications. Or, any other signs or symptoms that you may have questions about. DISPOSITION: 
 x Home With: 
 OT  PT  [place]  RN  
  
 SNF/Inpatient Rehab/LTAC Independent/assisted living Hospice Other: CDMP Checked:  
Yes x PROBLEM LIST Updated: 
Yes x Signed:  
Suni Mercado MD 
1/10/2018 
1:01 PM 
 
  
  
  
Moneylib Announcement We are excited to announce that we are making your provider's discharge notes available to you in Moneylib.   You will see these notes when they are completed and signed by the physician that discharged you from your recent hospital stay. If you have any questions or concerns about any information you see in MyChart, please call the Health Information Department where you were seen or reach out to your Primary Care Provider for more information about your plan of care. Introducing [facility]! [facility] introduce portal paciente MyChart . Ahora se puede acceder a partes de tam expediente médico, enviar por correo electrónico la oficina de tam médico y solicitar renovaciones de medicamentos en línea. En tam navegador de Internet ,  Parrot a [email or web address] Harjinder clic en el usuario por Sudheer Elkins? Sharene Lore clic aquí en la sesión Serjio Freeman. Verá la página de registro Benedict. Ingrese tam código de Bank  [place] magalys y ofelia aparece a continuación. Usted no tendrá que [employer] código después de sukhdeep completado el proceso de registro . Si usted no se inscribe antes de la fecha de caducidad , debe solicitar un nuevo código. · MyChart Código de acceso : [id number] Expires: 2/18/2018  3:05 PM 
 
Ingresa los últimos cuatro dígitos de tma Número de Seguro Social ( xxxx ) y fecha de nacimiento ( dd / mm / aaaa ) ofelia se indica y harjinder clic en Enviar. Usted será llevado a la siguiente página de registro . Crear un ID MyChart . Esta será tam ID de inicio de sesión de MyChart y no puede ser [place] , por lo que pensar en lam que es Terie Bert y fácil de recordar . Crear lam contraseña MyChart . Usted puede cambiar tam contraseña en cualquier momento . Ingrese tam Password Reset de preguntas y [place] . [place] se puede utilizar en un momento posterior si usted olvida tam contraseña. Introduzca tam dirección de correo electrónico . Nelli Doing recibirá lam notificación por correo electrónico cuando la nueva información está disponible en MyChart . José Luis Rule clic en Registrarse.  Carvel Dunham víctor y descargar porciones de tam expediente médico. 
 Sheeba petar en el enlace de descarga del menú Resumen para descargar lam copia portátil de tam información médica . Si tiene [employer] , por favor visite la sección de preguntas frecuentes del sitio web MyChart . Recuerde, MyChart NO es que se utilizará para las necesidades urgentes. Para emergencias médicas , llame al 911 . Ahora disponible en tam iPhone y Android ! Providers Seen During Your Hospitalization Provider Specialty Primary office phone Narendra Soto MD Emergency Medicine [phone] Serg Cedeno MD Family Practice [phone] Vandana Bell MD Internal Medicine [phone] Wing Ann MD Internal Medicine [phone] Your Primary Care Physician (PCP) Primary Care Physician Office Phone Office Fax Yaseunice Silva [phone] You are allergic to the following Allergen Reactions Morphine Nausea and Vomiting Other (comments) Histamine release? Red streak on arm after IV injection Recent Documentation Height Weight BMI OB Status Smoking Status 1.524 m 98.2 kg 42.3 kg/m2 Postmenopausal Former Smoker Emergency Contacts Name Discharge Info Relation Home Work Mobile [phone]  CAREGIVER [3] Spouse [3] [phone] Patient Belongings The following personal items are in your possession at time of discharge: 
  Dental Appliances: None  Visual Aid: Glasses, At bedside Please provide this summary of care documentation to your next provider. Signatures-by signing, you are acknowledging that this After Visit Summary has been reviewed with you and you have received a copy. Patient Signature:  ____________________________________________________________ Date:  ____________________________________________________________  
  
Alan Kern  Provider Signature: ____________________________________________________________ Date:  ____________________________________________________________  
  
  
   
  
[facility] 
 
 
 [address] 
[phone] Patient: Wayne Ortiz MRN: [id number] LHP:0/1/9769 Sobre hinojosa hospitalización Le admitieron el:  January 8, 2018 Hinojosa tratamiento más reciente fue el:  [facility] 6S NEURO-SCI TELE Le dieron de lucas el:  January 10, 2018 Por qué le ingresaron Hinojosa diagnosis primaria es:  Gi Bleed Follow-up Information Follow up With Details Comments Contact Info Chanel Verdugo MD In 1 week  [address] [place] [address] 
[phone] Yordy Abarca MD In 1 week  200 [facility] [address] [place] [address] 
[phone] Your Scheduled Appointments Thursday February 22, 2018 11:00 AM EST PROCEDURE with Ildefonso Bartlett MD  
[address] ([facility]) 200 [facility] [phone] [address]  
[phone] Discharge Orders [employer] A check carlos indicates which time of day the medication should be taken. My Medications EMPIECE a Observe Medical Instructions Each Dose to Equal  
 Morning Noon Evening Bedtime  
 pantoprazole 40 mg tablet También conocido ofelia:  PROTONIX Your last dose was: Your next dose is: Take 1 Tab by mouth Before breakfast and dinner for 30 days. 40 mg  
    
   
   
   
  
  
[address]A [address] Instructions Each Dose to Equal  
 Morning Noon Evening Bedtime  
 ferrous sulfate 325 mg (65 mg iron) tablet Your last dose was: Your next dose is: Take 1 Tab by mouth two (2) times a day. 325 mg  
    
   
   
   
  
 furosemide 40 mg tablet También conocido ofelia:  LASIX Your last dose was: Your next dose is: Take 1 Tab by mouth daily. 40 mg  
    
   
   
   
  
 lactulose 20 gram/30 mL Soln solution También conocido ofelia:  [address] [facility] Your last dose was: Your next dose is: Take 15 mL by mouth two (2) times a day. 10 g  
    
   
   
   
  
 lisinopril 20 mg tablet También conocido ofelia:  Monisha Fair Your last dose was: Your next dose is: Take 1 Tab by mouth daily. Luxembourgish instructions 20 mg  
    
   
   
   
  
 propranolol 10 mg tablet También conocido ofelia:  INDERAL Your last dose was: Your next dose is: Take  by mouth three (3) times daily. spironolactone 100 mg tablet También conocido ofelia:  ALDACTONE Your last dose was: Your next dose is: Take 1 Tab by mouth daily. 100 mg  
    
   
   
   
  
 sucralfate 100 mg/mL suspension También conocido ofelia:  Wallene Slicker Your last dose was: Your next dose is: Take 10 mL by mouth every six (6) hours. 1 g  
    
   
   
   
  
 traMADol 50 mg tablet También conocido ofelia:  ULTRAM  
   
Your last dose was: Your next dose is: Take 50 mg by mouth every six (6) hours as needed for Pain. 50 mg DEJE de monika estos medicamentos   
 metoprolol tartrate 25 mg tablet También conocido ofelia:  Timoteo Palomo Dónde puede recoger justus medicamentos Information on where to get these meds will be given to you by the nurse or doctor. ! Pregunte a tam médico o enfermero/a sobre estos medicamentos  
  pantoprazole 40 mg tablet Instrucciones a jerel de lucas Discharge Instructions PATIENT ID: Merissa Dooley MRN: [id number] YOB: 1955 DATE OF ADMISSION: 1/8/2018  1:06 AM   
DATE OF DISCHARGE: 1/10/2018 PRIMARY CARE PROVIDER: Griselda Hawk MD  
 
ATTENDING PHYSICIAN: Solo Boucher MD 
 DISCHARGING PROVIDER: Lisa Ford MD   
To contact this individual call [phone] and ask the  to page. If unavailable ask to be transferred the Adult Hospitalist Department. DISCHARGE DIAGNOSES Gi bleed CONSULTATIONS: IP CONSULT TO GASTROENTEROLOGY 
IP CONSULT TO HEPATOLOGY 
IP CONSULT TO HOSPITALIST 
 
PROCEDURES/SURGERIES: Procedure(s): 
COLONOSCOPY PENDING TEST RESULTS:  
At the time of discharge the following test results are still pending: none FOLLOW UP APPOINTMENTS:  
Follow-up Information Follow up With Details Comments Contact Info Canelo Brunner MD In 1 week  [address] [place] [address] 
[phone] Yordy San MD In 1 week  [address] [facility] [place] [employer] [address] [place] [address] 
[phone] ADDITIONAL CARE RECOMMENDATIONS:  
Follow up with PMD as well as GI for outpatient colonoscopy DIET: Cardiac Diet ACTIVITY: Activity as tolerated DISCHARGE MEDICATIONS: 
 See Medication Reconciliation Form · It is important that you take the medication exactly as they are prescribed. · Keep your medication in the bottles provided by the pharmacist and keep a list of the medication names, dosages, and times to be taken in your wallet. · Do not take other medications without consulting your doctor. NOTIFY YOUR PHYSICIAN FOR ANY OF THE FOLLOWING:  
Fever over 101 degrees for 24 hours. Chest pain, shortness of breath, fever, chills, nausea, vomiting, diarrhea, change in mentation, falling, weakness, bleeding. Severe pain or pain not relieved by medications. Or, any other signs or symptoms that you may have questions about. DISPOSITION: 
 x Home With: 
 OT  PT  [facility]  RN  
  
 SNF/Inpatient Rehab/LTAC Independent/assisted living Hospice Other: CDMP Checked:  
Yes x PROBLEM LIST Updated: 
Yes x Signed:  
Lisa Ford MD 
1/10/2018 
1:01 PM 
 
  
  
  
 MyChart Announcement We are excited to announce that we are making your provider's discharge notes available to you in Clinical Datahart. You will see these notes when they are completed and signed by the physician that discharged you from your recent hospital stay. If you have any questions or concerns about any information you see in Clinical Datahart, please call the Health Information Department where you were seen or reach out to your Primary Care Provider for more information about your plan of care. Introducing [facility] named [facility]! [facility] introduce portal paciente MyChart . Ahora se puede acceder a partes de tam expediente médico, enviar por correo electrónico la oficina de tam médico y solicitar renovaciones de medicamentos en línea. En tam navegador de Internet , Chandler Pereyra a https://[email or web address]. Eventifier/mychart Harjinder clic en el usuario por Ro Been? Silvestre Kaplan clic aquí en la sesión Jake Madison. Verá la página de registro [place]. Ingrese tam código de [facility] magalys y ofelia aparece a continuación. Sebas no tendrá que [employer] código después de sukhdeep completado el proceso de registro . Si usted no se inscribe antes de la fecha de caducidad , debe solicitar un nuevo código. · [place] Código de acceso : [id number] Expires: 2/18/2018  3:05 PM 
 
Ingresa los últimos cuatro dígitos de tam Número de Seguro Social ( xxxx ) y fecha de nacimiento ( dd / mm / aaaa ) ofelia se indica y harjinder clic en Enviar. Sebas será llevado a la siguiente página de registro . Crear un ID MyChart . Esta será tam ID de inicio de sesión de MyChart y no puede ser [place] , por lo que pensar en lam que es Adrianne Cristofer y fácil de recordar . Crear lam contraseña [facility] . Sebas puede cambiar tam contraseña en cualquier momento . Ingrese tam Password Reset de preguntas y Phillips . [place] se puede utilizar en un momento posterior si usted olvida tam contraseña.  
Introduzca tam dirección de correo electrónico . Bard Stein recibirá Mickie Ingram notificación por correo electrónico cuando la nueva información está disponible en MyChart . Cony Freddye clic en Registrarse. Xenia Falanna víctor y descargar porciones de hinojosa expediente médico. 
Sheeba clic en el enlace de descarga del menú Resumen para descargar lam copia portátil de hinojosa información médica . Si tiene [employer] , por favor visite la sección de preguntas frecuentes del sitio web MyChart . Recuerde, MyChart NO es que se utilizará para las necesidades urgentes. Para emergencias médicas , llame al 911 . Ahora disponible en hinojosa iPhone y Android ! Providers Seen During Your Hospitalization Personal Médico [employer] Teléfono principal de la oficina Tracy Joseph MD Emergency Medicine [phone] Lianet Santana MD Family Practice [phone] Peggy Dupont MD Internal Medicine [phone] Timothy Morton MD Internal Medicine [phone] Hinojosa médico de atención primaria (PCP ) Primary Care Physician Office Phone Office Fax [facility] [phone] Tiene alergias a lo siguiente Waylan Galea Morphine Nausea and Vomiting Other (comments) Histamine release? Red streak on arm after IV injection Documentación recientes Height Weight BMI (IMC) Estado obstétrico Estatus de tabaquísmo 1.524 m 98.2 kg 42.3 kg/m2 Postmenopausal Former Smoker Emergency Contacts Name Discharge Info Relation Home Work Mobile [address] CAREGIVER [3] Spouse [3] [phone] Patient Belongings The following personal items are in your possession at time of discharge: 
  Dental Appliances: None  Visual Aid: Glasses, At bedside Please provide this summary of care documentation to your next provider. Signatures-by signing, you are acknowledging that this After Visit Summary has been reviewed with you and you have received a copy. Patient Signature:  ____________________________________________________________ Date:  ____________________________________________________________  
  
Devota Dandy Provider Signature:  ____________________________________________________________ Date:  ____________________________________________________________

## 2018-01-08 NOTE — PROGRESS NOTES
Kevyn Cabrera  1955  [id number]          Situation:  Verbal report received from: víctor Amaya  Preoperative diagnosis: GI Bleed  Patient stated .     Background:  Procedure: Procedure(s):  ESOPHAGOGASTRODUODENOSCOPY (EGD)  Physician performing procedure; Dr. Javed [place]    Patient diabetic no   Patient taking blood thinners no   Patient has a defibrillator: no   Patient needs antibiotics: no     Assessment:  Vital signs stable yes  Alert and oriented yes  Abdominal assessment: round and soft       Recommendation:  Endoscopic Procedure  Family or Friend yes  Permission to share finding with Family or Friend yes

## 2018-01-08 NOTE — ROUTINE PROCESS
TRANSFER - OUT REPORT:    Verbal report given to Tiffany Denny RN(name) on Ana Abbasi  being transferred to ICU(unit) for routine progression of care       Report consisted of patients Situation, Background, Assessment and   Recommendations(SBAR). Information from the following report(s) SBAR, ED Summary, [facility] and Recent Results was reviewed with the receiving nurse. Lines:   Peripheral IV 01/08/18 Left Arm (Active)   Site Assessment Clean, dry, & intact 1/8/2018  2:14 AM   Phlebitis Assessment 0 1/8/2018  2:14 AM   Infiltration Assessment 0 1/8/2018  2:14 AM   Dressing Status Clean, dry, & intact 1/8/2018  2:14 AM       Peripheral IV 01/08/18 Right Hand (Active)   Site Assessment Clean, dry, & intact 1/8/2018  2:14 AM   Phlebitis Assessment 0 1/8/2018  2:14 AM   Infiltration Assessment 0 1/8/2018  2:14 AM   Dressing Status Clean, dry, & intact 1/8/2018  2:14 AM        Opportunity for questions and clarification was provided.       Patient transported with:   Monitor, RN    Visit Vitals    /86 (BP 1 Location: Right arm, BP Patient Position: At rest)    Pulse 82    Temp 98.3 °F (36.8 °C)    Resp 15    Ht 5' (1.524 m)    Wt 93 kg (205 lb)    SpO2 95%    BMI 40.04 kg/m2

## 2018-01-08 NOTE — PROCEDURES
[address]  [facility] [address] (EGD) Procedure Note    Radha Soriano  1955  [id number]      Procedure: Endoscopic Gastroduodenoscopy --diagnostic    Indication:  Gastrointestinal hemorrhage, unspecified     Pre-operative Diagnosis: see indication above    Post-operative Diagnosis: see findings below    : Omar Phillips MD    Referring Provider:  Dayday Pham MD      Anesthesia/Sedation:  MAC anesthesia Propofol        Procedure Details     After informed consent was obtained for the procedure, with all risks and benefits of procedure explained the patient was taken to the endoscopy suite and placed in the left lateral decubitus position. Following sequential administration of sedation as per above, the endoscope was inserted into the mouth and advanced under direct vision to second portion of the duodenum. A careful inspection was made as the gastroscope was withdrawn, including a retroflexed view of the proximal stomach; findings and interventions are described below. Findings:   Esophagus: single grade 1 esophageal varix that flattened with insufflation. No stigmata of recent bleeding  Stomach: non-bleeding portal hypertensive gastropathy, multiple antral erosions 2-3 mm, no active bleeding- not biopsied in setting of acute GI bleeding, no gastric varices  Duodenum: normal to second portion, normal tangential appearance of the ampulla      Therapies: none    Specimens: none         EBL: None      Complications:   None; patient tolerated the procedure well. Impression:    1. Multiple gastric erosions - non-bleeding  2. Portal hypertensive gastropathy (non-bleeding)  3. Low grade esophageal varix - no stigmata of recent bleeding    Recommendations:  1. Continue PPI  2. Okay to discontinue octreotide  3. Clear liquid diet  4. Discontinue non-essential NSAID's  5.  Will follow    Signed By: oSy Tomas Meir Cotton MD     1/8/2018  10:55 AM

## 2018-01-08 NOTE — H&P
[facility]  ACUTE CARE HISTORY AND PHYSICAL    Name:CAROLINE Easton  MR#: [id number]  : 1955  ACCOUNT #: [de-identified]   DATE OF SERVICE: 2018    CHIEF COMPLAINT:  Chest pain, vomiting blood. HISTORY OF PRESENT ILLNESS:  A 68-year-old female with past medical history of liver cirrhosis, chronic HSV, esophageal varices, arthritis, presented to the Emergency Department from home with chief complaint of chest pain and vomiting blood (hematemesis). The patient noted onset of symptoms starting last night at approximately 11:00 p.m. when she started vomiting dark blood. She notes that episodes were of large quantity. She had nausea and vomiting. She denied abdominal pain. She reports having left-sided chest pain, mild to moderate severity, rated approximately 6/10, intermittent without specific exacerbating or alleviating factors making. She had episodes of generalized weakness, dizziness, lightheadedness. There was no reported syncope, loss of consciousness, headache, neck pain, back pain, abdominal pain, melena, diarrhea, dysuria, hematuria, calf pain, swelling, edema, fever, chills, rash. The patient notably had been followed by a hepatologist, Dr. Hipolito Melton. She was last hospitalized 2017 to 2017, with anemia, and was transfused with packed red blood cells. Patient underwent an EGD with gastroesophageal and gastric variceal banding on 08/10/2017, by Dr. Hipolito Melton, with findings of esophageal varices without bleeding and portal hypertensive gastropathy. Patient underwent subsequent EGD with transoral biopsy on 2017, which showed evidence of esophageal ulcer with bleeding. Tonight, the patient notes her last bowel movement was earlier at approximately 9:00 p.m. On arrival in the Emergency Department, initially recorded vital signs were blood pressure 181/77, heart rate of 77, respiratory rate of 18, O2 saturation is 99% on room air.   Workup including labs showed hemoglobin of 9.2. Patient notes chest pain has resolved. Per the ED, the patient was started on 0.9% normal saline, followed with 8 mL IV fluid bolus, octreotide 50 mcg per hour IV continuous infusion, Protonix 80 mg IV, Zofran 8 mg IV. Patient is now seen for admission to the hospitalist service for continued evaluation and treatment. PAST MEDICAL HISTORY:    1. Esophageal varices, esophageal ulcer with bleeding. 2.  Liver cirrhosis. 3.  Chronic HSV. 4.  Arthritis. 5.  Chronic hip, shoulder, knee, ankle and back pain. 6.  Obesity. PAST SURGICAL HISTORY:    1.  Status post EGD, 08/10/2017 and 2017.  2.  Gastroesophageal gastric banding. 3.  Cholecystectomy. 4.   section. MEDICATIONS:  Ferrous sulfate 325 mg 1 tablet p.o. b.i.d., furosemide 40 mg p.o. daily, lactulose 20 grams per 30 mL, 15 mL p.o. b.i.d., lisinopril 20 mg 1 tablet p.o. every day, metoprolol tartrate 25 mg p.o. b.i.d., spironolactone 100 mg p.o. daily, sucralfate 100 mg per mL, 10 mL p.o. q. 6 hours, tramadol 50 mg p.o. q. 6 hours, atenolol 10 mg p.o. b.i.d. ALLERGIES:  MORPHINE. SOCIAL HISTORY:  Former smoker of cigarettes but fully quit approximately 20 years ago, denies alcohol and illicit drugs. FAMILY HISTORY:  The patient notes she is unaware of her family history. REVIEW OF SYSTEMS:  All systems reviewed. Pertinent positives were as HPI, otherwise negative. PHYSICAL EXAMINATION:  VITAL SIGNS:  Temperature is 98.4 degrees currently, blood pressure 163/93, heart rate is 70, respiratory rate is 16, O2 saturation 97% on room air, recorded weight 205 pounds (93 kg), recorded height of 5 feet 0 inches tall. GENERAL:  Obese female in no acute respiratory distress. PSYCHIATRIC:  Patient is awake, oriented x3. NEUROLOGIC:  GCS of 15, moves extremities x4, sensation grossly intact without slurred speech or facial droop. HEENT:  Normocephalic, atraumatic, PERRLA, EOMs intact.   Sclerae are anicteric, conjunctivae clear. Nares are patent. Oropharynx is clear. Tongue is midline, nonedematous. NECK:  Supple, without lymphadenopathy, JVD, crepitus or thyromegaly. LYMPH NODES:  No cervical or supraclavicular adenopathy. RESPIRATORY:  Lungs clear to auscultation bilaterally. CARDIOVASCULAR:  Heart regular rate and rhythm, normal S1, S2, without murmurs, rubs or gallops. GASTROINTESTINAL:  Abdomen soft, nontender, nondistended, normoactive bowel sounds. No rebound, guarding or rigidity. No auscultated abdominal bruits. No palpable mass. BACK:  No CVA tenderness. No step-off deformity. MUSCULOSKELETAL:  No acute calf bony deformity, negative calf tenderness. VASCULAR:  2+ radial, 1+ dorsalis pedis pulses. EXTREMITIES:  Without cyanosis or clubbing, trace lower extremity nonpitting edema. SKIN:  Warm and dry. LABORATORY DATA:  Reviewed as follows:  Sodium 146, potassium 4.1, chloride 115, CO2 of 23, BUN of 20, creatinine 0.45, glucose 135, anion gap of 8, calcium of 7.9, GFR greater than 60, total bilirubin 0.9, total protein 6.4, albumin 2.7, ALT 45, AST 35, alkaline phosphatase 137, troponin I less than 0.04, WBC 3.9, hemoglobin 9.2, hematocrit 29.8, platelets are 56, neutrophils 80%, INR of 1.3, PT of 12.8. Chest x-ray portable:  No acute thoracic disease and cardiomegaly. A 12-lead EKG:  Normal sinus rhythm at 78 beats per minute. ASSESSMENT AND PLAN:  1. Gastrointestinal bleed. Admit to intensive care unit. There are concerns for the patient's volume and frequency of hematemesis noted in the emergency department. Per discussion with emergency room medical doctor, gastroenterologists have consulted. Plan to keep the patient n.p.o. Continue with IV fluids. Repeat CBC this a.m. Monitor closely. 2.  Acute chest pain. Repeat troponin level. Continue on telemetry monitoring. 3.  Nausea and vomiting, ordered Zofran 4 mg IV q. 6 hours p.r.n.  4.  Liver cirrhosis.   Plan as noted above. This has been followed up with a hepatologist, Dr. Nafisa Frias. 5.  Hematemesis, plan as noted above. 6.  Hypertension. Monitor blood pressure closely and provide antihypertensive medications as needed. 7.  Deep venous thrombosis prophylaxis, sequential compression devices bilateral lower extremities. MELVIN L. Doraine Schilder, MD MLP / Benedicto Betancur  D: 01/08/2018 07:39     T: 01/08/2018 08:52  JOB #: [id number]

## 2018-01-09 LAB
ANION GAP SERPL CALC-SCNC: 6 MMOL/L (ref 5–15)
ATRIAL RATE: 78 BPM
BASOPHILS # BLD: 0 K/UL (ref 0–0.1)
BASOPHILS NFR BLD: 0 % (ref 0–1)
BUN SERPL-MCNC: 19 MG/DL (ref 6–20)
BUN/CREAT SERPL: 31 (ref 12–20)
CALCIUM SERPL-MCNC: 7.7 MG/DL (ref 8.5–10.1)
CALCULATED P AXIS, ECG09: 65 DEGREES
CALCULATED R AXIS, ECG10: 29 DEGREES
CALCULATED T AXIS, ECG11: 36 DEGREES
CHLORIDE SERPL-SCNC: 112 MMOL/L (ref 97–108)
CO2 SERPL-SCNC: 27 MMOL/L (ref 21–32)
CREAT SERPL-MCNC: 0.61 MG/DL (ref 0.55–1.02)
DIAGNOSIS, 93000: NORMAL
EOSINOPHIL # BLD: 0.1 K/UL (ref 0–0.4)
EOSINOPHIL NFR BLD: 3 % (ref 0–7)
ERYTHROCYTE [DISTWIDTH] IN BLOOD BY AUTOMATED COUNT: 14.5 % (ref 11.5–14.5)
GLUCOSE SERPL-MCNC: 92 MG/DL (ref 65–100)
HCT VFR BLD AUTO: 24.3 % (ref 35–47)
HGB BLD-MCNC: 7.5 G/DL (ref 11.5–16)
LYMPHOCYTES # BLD: 0.6 K/UL (ref 0.8–3.5)
LYMPHOCYTES NFR BLD: 30 % (ref 12–49)
MCH RBC QN AUTO: 30.2 PG (ref 26–34)
MCHC RBC AUTO-ENTMCNC: 30.9 G/DL (ref 30–36.5)
MCV RBC AUTO: 98 FL (ref 80–99)
MONOCYTES # BLD: 0.2 K/UL (ref 0–1)
MONOCYTES NFR BLD: 10 % (ref 5–13)
NEUTS SEG # BLD: 1.2 K/UL (ref 1.8–8)
NEUTS SEG NFR BLD: 57 % (ref 32–75)
P-R INTERVAL, ECG05: 146 MS
PLATELET # BLD AUTO: 50 K/UL (ref 150–400)
POTASSIUM SERPL-SCNC: 3.5 MMOL/L (ref 3.5–5.1)
Q-T INTERVAL, ECG07: 414 MS
QRS DURATION, ECG06: 86 MS
QTC CALCULATION (BEZET), ECG08: 471 MS
RBC # BLD AUTO: 2.48 M/UL (ref 3.8–5.2)
SODIUM SERPL-SCNC: 145 MMOL/L (ref 136–145)
VENTRICULAR RATE, ECG03: 78 BPM
WBC # BLD AUTO: 2.1 K/UL (ref 3.6–11)

## 2018-01-09 PROCEDURE — 96365 THER/PROPH/DIAG IV INF INIT: CPT

## 2018-01-09 PROCEDURE — 74011250637 HC RX REV CODE- 250/637: Performed by: NURSE PRACTITIONER

## 2018-01-09 PROCEDURE — 74011000250 HC RX REV CODE- 250: Performed by: INTERNAL MEDICINE

## 2018-01-09 PROCEDURE — 74011000258 HC RX REV CODE- 258: Performed by: HOSPITALIST

## 2018-01-09 PROCEDURE — 85025 COMPLETE CBC W/AUTO DIFF WBC: CPT | Performed by: HOSPITALIST

## 2018-01-09 PROCEDURE — 80048 BASIC METABOLIC PNL TOTAL CA: CPT | Performed by: HOSPITALIST

## 2018-01-09 PROCEDURE — 65660000000 HC RM CCU STEPDOWN

## 2018-01-09 PROCEDURE — 36415 COLL VENOUS BLD VENIPUNCTURE: CPT | Performed by: HOSPITALIST

## 2018-01-09 PROCEDURE — 74011250637 HC RX REV CODE- 250/637: Performed by: HOSPITALIST

## 2018-01-09 RX ORDER — POLYETHYLENE GLYCOL 3350, SODIUM SULFATE ANHYDROUS, SODIUM BICARBONATE, SODIUM CHLORIDE, POTASSIUM CHLORIDE 236; 22.74; 6.74; 5.86; 2.97 G/4L; G/4L; G/4L; G/4L; G/4L
4000 POWDER, FOR SOLUTION ORAL ONCE
Status: COMPLETED | OUTPATIENT
Start: 2018-01-09 | End: 2018-01-09

## 2018-01-09 RX ADMIN — PROPRANOLOL HYDROCHLORIDE 10 MG: 10 TABLET ORAL at 17:11

## 2018-01-09 RX ADMIN — SUCRALFATE 1 G: 1 SUSPENSION ORAL at 19:10

## 2018-01-09 RX ADMIN — Medication 10 ML: at 13:10

## 2018-01-09 RX ADMIN — Medication 10 ML: at 22:38

## 2018-01-09 RX ADMIN — PANTOPRAZOLE SODIUM 40 MG: 40 TABLET, DELAYED RELEASE ORAL at 17:10

## 2018-01-09 RX ADMIN — SUCRALFATE 1 G: 1 SUSPENSION ORAL at 06:58

## 2018-01-09 RX ADMIN — SUCRALFATE 1 G: 1 SUSPENSION ORAL at 13:09

## 2018-01-09 RX ADMIN — SPIRONOLACTONE 100 MG: 25 TABLET, FILM COATED ORAL at 08:55

## 2018-01-09 RX ADMIN — LISINOPRIL 20 MG: 20 TABLET ORAL at 08:54

## 2018-01-09 RX ADMIN — FUROSEMIDE 40 MG: 40 TABLET ORAL at 08:54

## 2018-01-09 RX ADMIN — PROPRANOLOL HYDROCHLORIDE 10 MG: 10 TABLET ORAL at 08:54

## 2018-01-09 RX ADMIN — SODIUM CHLORIDE 50 ML/HR: 450 INJECTION, SOLUTION INTRAVENOUS at 08:51

## 2018-01-09 RX ADMIN — POLYETHYLENE GLYCOL-3350 AND ELECTROLYTES 4000 ML: 236; 6.74; 5.86; 2.97; 22.74 POWDER, FOR SOLUTION ORAL at 17:10

## 2018-01-09 RX ADMIN — LACTULOSE 10 G: 20 SOLUTION ORAL at 19:11

## 2018-01-09 RX ADMIN — PANTOPRAZOLE SODIUM 40 MG: 40 TABLET, DELAYED RELEASE ORAL at 06:58

## 2018-01-09 RX ADMIN — PROPRANOLOL HYDROCHLORIDE 10 MG: 10 TABLET ORAL at 22:38

## 2018-01-09 RX ADMIN — LACTULOSE 10 G: 20 SOLUTION ORAL at 08:51

## 2018-01-09 RX ADMIN — Medication 10 ML: at 06:58

## 2018-01-09 NOTE — PROGRESS NOTES
Hospitalist Progress Note  Alvarez Arechiga MD  Answering service: [phone] from in house phone  Cell: [phone]      Date of Service:  2018  NAME:  Umesh Phillips  :  1955  MRN:  [id number]      Admission Summary:   60-year-old female with past medical history of liver cirrhosis, chronic HSV, esophageal varices, arthritis, presented to the Emergency Department from home with chief complaint of chest pain and vomiting blood (hematemesis). The patient noted onset of symptoms starting last night at approximately 11:00 p.m. when she started vomiting dark blood. She notes that episodes were of large quantity. She had nausea and vomiting. She denied abdominal pain. She reports having left-sided chest pain, mild to moderate severity, rated approximately 6/10, intermittent without specific exacerbating or alleviating factors making. She had episodes of generalized weakness, dizziness, lightheadedness. There was no reported syncope, loss of consciousness, headache, neck pain, back pain, abdominal pain, melena, diarrhea, dysuria, hematuria, calf pain, swelling, edema, fever, chills, rash. The patient notably had been followed by a hepatologist, Dr. Jr Batista. She was last hospitalized 2017 to 2017, with anemia, and was transfused with packed red blood cells. Patient underwent an EGD with gastroesophageal and gastric variceal banding on 08/10/2017, by Dr. Jr Batista, with findings of esophageal varices without bleeding and portal hypertensive gastropath    Interval history / Subjective:     F/u GI bleed     Assessment & Plan:     GI bleed  -S/p EGD  Multiple gastric erosions - non-bleeding. Portal hypertensive gastropathy (non-bleeding).  Low grade esophageal varix - no stigmata of recent bleeding  -PPI, continue  -Appreciate GI input, recommended colonoscopy as Hb dropped further    Acute blood loss anemia  -Monitor H/H    Pancytopenia  -with leukopenia/thrombocytopenia  -likely sec to cirrhosis  -Monitor    Acute chest pain  -monitor    Nausea and vomiting  -Symptomatic management, seems better today    History of cirrhosis  -with history of gastric varices s/p banding  -Chronic Hep C  -Awaiting Hepatology input  -on Propranolol/Aldactone/Lasix    Clear liquid, NPO from midnight    Code status: FULL CODE  DVT prophylaxis: scd    Plan: Follow GI, hepatology. Likely discharge soon    Care Plan discussed with: Patient/Family  Disposition: Home w/Family     Hospital Problems  Date Reviewed: 1/8/2018          Codes Class Noted POA    * (Principal)GI bleed ICD-10-CM: K92.2  ICD-9-CM: 578.9  1/8/2018 Unknown                Review of Systems:   A comprehensive review of systems was negative except for that written in the HPI. Vital Signs:    Last 24hrs VS reviewed since prior progress note. Most recent are:  Visit Vitals    /49 (BP 1 Location: Left arm, BP Patient Position: At rest)    Pulse (!) 57    Temp 98 °F (36.7 °C)    Resp 15    Ht 5' (1.524 m)    Wt 96.8 kg (213 lb 4.8 oz)    SpO2 98%    BMI 41.66 kg/m2         Intake/Output Summary (Last 24 hours) at 01/09/18 1621  Last data filed at 01/09/18 0000   Gross per 24 hour   Intake              400 ml   Output              350 ml   Net               50 ml        Physical Examination:             Constitutional:  No acute distress, cooperative, pleasant    ENT:  Oral mucous moist, oropharynx benign. Neck supple,    Resp:  CTA bilaterally. No wheezing/rhonchi/rales. No accessory muscle use   CV:  Regular rhythm, normal rate, no murmurs, gallops, rubs    GI:  Soft, non distended, non tender. normoactive bowel sounds, no hepatosplenomegaly     Musculoskeletal:  No edema, warm, 2+ pulses throughout    Neurologic:  Moves all extremities.   AAOx3, CN II-XII reviewed     Skin:  Good turgor, no rashes or ulcers       Data Review:    Review and/or order of clinical lab test      Labs:     Recent Labs      01/09/18   0310  01/08/18 0452   WBC  2.1*  2.8*   HGB  7.5*  8.5*   HCT  24.3*  27.2*   PLT  50*  50*     Recent Labs      01/09/18   0310  01/08/18 0452  01/08/18 0126   NA  145  146*  146*   K  3.5  4.4  4.1   CL  112*  115*  115*   CO2  27  22  23   BUN  19  22*  20   CREA  0.61  0.44*  0.45*   GLU  92  119*  135*   CA  7.7*  7.9*  7.9*     Recent Labs      01/08/18 0126   SGOT  35   ALT  45   AP  137*   TBILI  0.9   TP  6.4   ALB  2.7*   GLOB  3.7     Recent Labs      01/08/18 0452 01/08/18   0142   INR  1.3*  1.2*   PTP  12.8*   --       No results for input(s): FE, TIBC, PSAT, FERR in the last 72 hours. Lab Results   Component Value Date/Time    Folate >20.0 09/29/2017 12:00 AM      No results for input(s): PH, PCO2, PO2 in the last 72 hours.   Recent Labs      01/08/18 0126   TROIQ  <0.04     No results found for: CHOL, CHOLX, CHLST, CHOLV, HDL, LDL, LDLC, DLDLP, TGLX, TRIGL, TRIGP, CHHD, CHHDX  Lab Results   Component Value Date/Time    Glucose (POC) 141 01/08/2018 01:41 AM     Lab Results   Component Value Date/Time    Color YELLOW 11/06/2016 12:23 PM    Appearance CLOUDY 11/06/2016 12:23 PM    Specific gravity 1.025 11/06/2016 12:23 PM    pH (UA) 6.0 11/06/2016 12:23 PM    Protein 30 11/06/2016 12:23 PM    Glucose NEGATIVE  11/06/2016 12:23 PM    Ketone TRACE 11/06/2016 12:23 PM    Urobilinogen 1.0 11/06/2016 12:23 PM    Nitrites NEGATIVE  11/06/2016 12:23 PM    Leukocyte Esterase SMALL 11/06/2016 12:23 PM    Epithelial cells FEW 11/06/2016 12:23 PM    Bacteria NEGATIVE  11/06/2016 12:23 PM    WBC 0-4 11/06/2016 12:23 PM    RBC >100 11/06/2016 12:23 PM         Medications Reviewed:     Current Facility-Administered Medications   Medication Dose Route Frequency    PEG 3350-Electrolytes (GO-LYTELY) SUSPENSION 4,000 mL  4,000 mL Oral ONCE    sodium chloride (NS) flush 5-10 mL  5-10 mL IntraVENous Q8H    sodium chloride (NS) flush 5-10 mL  5-10 mL IntraVENous PRN    pantoprazole (PROTONIX) tablet 40 mg  40 mg Oral ACB&D    0.45% sodium chloride infusion  50 mL/hr IntraVENous CONTINUOUS    lactulose (CHRONULAC) solution 10 g  15 mL Oral BID    propranolol (INDERAL) tablet 10 mg  10 mg Oral TID    lisinopril (PRINIVIL, ZESTRIL) tablet 20 mg  20 mg Oral DAILY    sucralfate (CARAFATE) 100 mg/mL oral suspension 1 g  1 g Oral Q6H    furosemide (LASIX) tablet 40 mg  40 mg Oral DAILY    spironolactone (ALDACTONE) tablet 100 mg  100 mg Oral DAILY     ______________________________________________________________________  EXPECTED LENGTH OF STAY: 2d 4h  ACTUAL LENGTH OF STAY:          1                 Cruz Garcia MD

## 2018-01-09 NOTE — PROGRESS NOTES
Rounded on [facility] patients and provided Anointing of the Sick at request of patient    Fr. Knight Duty
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
## 2018-01-09 NOTE — PROGRESS NOTES
Spiritual Care Partner Volunteer visited patient in  671 on 1/9/2018.   Documented by:  Chaplain Lazar MDiv, MS, [address] ([id number])

## 2018-01-09 NOTE — ROUTINE PROCESS
Bedside and Verbal shift change report given to Galen Sood RN (oncoming nurse) by Amanda Ruiz RN (offgoing nurse). Report included the following information SBAR, Kardex, Intake/Output, MAR, Accordion, Recent Results, Med Rec Status, Cardiac Rhythm NSR and Alarm Parameters .

## 2018-01-09 NOTE — INTERDISCIPLINARY ROUNDS
IDR/SLIDR Summary          Patient: Juliana Miranda MRN: [id number]    Age: 58 y.o. YOB: 1955 Room/Bed: [facility]   Admit Diagnosis: GI bleed  GI Bleed  Principal Diagnosis: GI bleed   Goals: safety, hemo stability  Readmission: NO  Quality Measure: Not applicable  VTE Prophylaxis: Mechanical  Influenza Vaccine screening completed? YES  Pneumococcal Vaccine screening completed? NO  Mobility needs: No   Nutrition plan:Yes  Consults:Case Management    Financial concerns:No  Escalated to CM? YES  RRAT Score: 19   Interventions:  Testing due for pt today?  NO  LOS: 1 days Expected length of stay 2 days  Discharge plan: home   PCP: Madhu Milian MD  Transportation needs: No    Days before discharge:two or more days before discharge   Discharge disposition: Home    Signed:     Carmina Pike  1/9/2018  7:56 AM

## 2018-01-09 NOTE — PROGRESS NOTES
[facility], [address]    GI PROGRESS NOTE    NAME: Orquidea Glez   :  1955   MRN:  [id number]       Subjective:   EGD with gastric erosions, non-bleeding portal hypertensive gastropathy, and low grade esophageal varix with no stigmata of recent bleeding. She had melena yesterday and today. Hgb down to 7.5. She clarifies that she has not had a colonoscopy in the past.    Review of Systems    Constitutional: negative fever, negative chills, negative weight loss  Eyes:   negative visual changes  ENT:   negative sore throat, tongue or lip swelling  Respiratory:  negative cough, negative dyspnea  Cards:  negative for chest pain, palpitations, lower extremity edema  GI:   See HPI  :  negative for frequency, dysuria  Integument:  negative for rash and pruritus  Heme:  negative for easy bruising and gum/nose bleeding  Musculoskel: negative for myalgias,  back pain and muscle weakness  Neuro: negative for headaches, dizziness, vertigo  Psych:  negative for feelings of anxiety, depression         Objective:     VITALS:   Last 24hrs VS reviewed since prior progress note. Most recent are:  Visit Vitals    /47 (BP 1 Location: Left arm, BP Patient Position: At rest)    Pulse (!) 55    Temp 98.2 °F (36.8 °C)    Resp 14    Ht 5' (1.524 m)    Wt 96.8 kg (213 lb 4.8 oz)    SpO2 97%    BMI 41.66 kg/m2       Intake/Output Summary (Last 24 hours) at 18 1345  Last data filed at 18 0000   Gross per 24 hour   Intake              600 ml   Output              525 ml   Net               75 ml     PHYSICAL EXAM:  General: WD, WN. Alert, cooperative, no acute distress    HEENT: NC, Atraumatic. PERRLA, EOMI. Anicteric sclerae. Abdomen: Soft, Non distended, Non tender.  +Bowel sounds, no HSM  Extremities: No c/c/e  Neurologic:  Alert and oriented X 3. No acute neurological distress   Psych:   Good insight. Not anxious nor agitated.     Lab Data Reviewed:   Recent Labs      01/09/18 0310 01/08/18 0452   WBC  2.1*  2.8*   HGB  7.5*  8.5*   HCT  24.3*  27.2*   PLT  50*  50*     Recent Labs      01/09/18 0310 01/08/18 0452   NA  145  146*   K  3.5  4.4   CL  112*  115*   CO2  27  22   BUN  19  22*   CREA  0.61  0.44*   GLU  92  119*   CA  7.7*  7.9*     Recent Labs      01/08/18   0126   SGOT  35   AP  137*   TP  6.4   ALB  2.7*   GLOB  3.7       ________________________________________________________________________       Assessment:   · Melena  · Anemia with Hgb 7.5 (trending down)  · Gastric erosions  · Non-bleeding portal hypertensive gastropathy  · Low grade esophageal varix     Plan:   · Continue PPI  · Clear liquid diet until midnight  · NPO after midnight for colonoscopy tomorrow  · PEG 3350 4 L to begin at 16:00  · Consent ordered  · Consider video capsule endoscopy next     Signed By: Tara Hampton.  Larry Grimm MD     1/9/2018  1:45 PM

## 2018-01-09 NOTE — PROGRESS NOTES
Care Management Interventions  PCP Verified by CM: Yes  Mode of Transport at Discharge: Other (see comment) (private vehicle)  Discharge Durable Medical Equipment: No  Physical Therapy Consult: No  Occupational Therapy Consult: No  Speech Therapy Consult: No  Current Support Network: Lives with Spouse (independent with ADLs)  Confirm Follow Up Transport: Family  Plan discussed with Pt/Family/Caregiver: Yes  Freedom of Choice Offered: Yes  [place] Resource Information Provided?: No  Discharge Location  Discharge Placement: Home    CM reviewed chart. Pt is independent with her ADLs and IADLS. Pt lives with her . Pt is listed as self pay, therefore [facility] should see patient and provide the Care Card Application. [facility] as [facility]. She gets her [facility] or at [facility] local [facility] pharmacy. Plan is to return home at time of discharge, family will provide transportation.    CARYN Ramos, ACM

## 2018-01-09 NOTE — ROUTINE PROCESS
TRANSFER - OUT REPORT:    Verbal report given to Marga PARIS(name) on Letty Eastman  being transferred to [facility](unit) for routine progression of care       Report consisted of patients Situation, Background, Assessment and   Recommendations(SBAR). Information from the following report(s) SBAR, Kardex, Procedure Summary, Intake/Output, MAR, Accordion, Recent Results, Med Rec Status, Cardiac Rhythm NSR and Alarm Parameters  was reviewed with the receiving nurse. Lines:   Peripheral IV 01/08/18 Right Hand (Active)   Site Assessment Clean, dry, & intact 1/8/2018  8:00 PM   Phlebitis Assessment 0 1/8/2018  8:00 PM   Infiltration Assessment 0 1/8/2018  8:00 PM   Dressing Status Clean, dry, & intact 1/8/2018  8:00 PM   Dressing Type Transparent;Tape 1/8/2018  8:00 PM   Hub Color/Line Status Pink; Infusing 1/8/2018  8:00 PM   Action Taken Open ports on tubing capped 1/8/2018  8:00 PM   Alcohol Cap Used Yes 1/8/2018  8:00 PM        Opportunity for questions and clarification was provided.       Patient transported with:   Monitor  Registered Nurse  Tech

## 2018-01-10 ENCOUNTER — ANESTHESIA EVENT (OUTPATIENT)
Dept: ENDOSCOPY | Age: 63
DRG: 378 | End: 2018-01-10
Payer: SUBSIDIZED

## 2018-01-10 ENCOUNTER — ANESTHESIA (OUTPATIENT)
Dept: ENDOSCOPY | Age: 63
DRG: 378 | End: 2018-01-10
Payer: SUBSIDIZED

## 2018-01-10 VITALS
HEIGHT: 60 IN | DIASTOLIC BLOOD PRESSURE: 53 MMHG | WEIGHT: 216.6 LBS | HEART RATE: 56 BPM | TEMPERATURE: 98.1 F | RESPIRATION RATE: 16 BRPM | BODY MASS INDEX: 42.53 KG/M2 | SYSTOLIC BLOOD PRESSURE: 133 MMHG | OXYGEN SATURATION: 98 %

## 2018-01-10 LAB
ANION GAP SERPL CALC-SCNC: 7 MMOL/L (ref 5–15)
BASOPHILS # BLD: 0 K/UL (ref 0–0.1)
BASOPHILS NFR BLD: 0 % (ref 0–1)
BUN SERPL-MCNC: 12 MG/DL (ref 6–20)
BUN/CREAT SERPL: 21 (ref 12–20)
CALCIUM SERPL-MCNC: 7.9 MG/DL (ref 8.5–10.1)
CHLORIDE SERPL-SCNC: 111 MMOL/L (ref 97–108)
CO2 SERPL-SCNC: 25 MMOL/L (ref 21–32)
CREAT SERPL-MCNC: 0.56 MG/DL (ref 0.55–1.02)
EOSINOPHIL # BLD: 0.1 K/UL (ref 0–0.4)
EOSINOPHIL NFR BLD: 2 % (ref 0–7)
ERYTHROCYTE [DISTWIDTH] IN BLOOD BY AUTOMATED COUNT: 14.5 % (ref 11.5–14.5)
GLUCOSE SERPL-MCNC: 99 MG/DL (ref 65–100)
HCT VFR BLD AUTO: 26.1 % (ref 35–47)
HGB BLD-MCNC: 8.1 G/DL (ref 11.5–16)
LYMPHOCYTES # BLD: 0.7 K/UL (ref 0.8–3.5)
LYMPHOCYTES NFR BLD: 25 % (ref 12–49)
MCH RBC QN AUTO: 30.5 PG (ref 26–34)
MCHC RBC AUTO-ENTMCNC: 31 G/DL (ref 30–36.5)
MCV RBC AUTO: 98.1 FL (ref 80–99)
MONOCYTES # BLD: 0.2 K/UL (ref 0–1)
MONOCYTES NFR BLD: 8 % (ref 5–13)
NEUTS SEG # BLD: 1.7 K/UL (ref 1.8–8)
NEUTS SEG NFR BLD: 65 % (ref 32–75)
PLATELET # BLD AUTO: 60 K/UL (ref 150–400)
POTASSIUM SERPL-SCNC: 3.3 MMOL/L (ref 3.5–5.1)
RBC # BLD AUTO: 2.66 M/UL (ref 3.8–5.2)
SODIUM SERPL-SCNC: 143 MMOL/L (ref 136–145)
WBC # BLD AUTO: 2.7 K/UL (ref 3.6–11)

## 2018-01-10 PROCEDURE — 76040000019: Performed by: INTERNAL MEDICINE

## 2018-01-10 PROCEDURE — 80048 BASIC METABOLIC PNL TOTAL CA: CPT | Performed by: HOSPITALIST

## 2018-01-10 PROCEDURE — 74011250636 HC RX REV CODE- 250/636

## 2018-01-10 PROCEDURE — 85025 COMPLETE CBC W/AUTO DIFF WBC: CPT | Performed by: HOSPITALIST

## 2018-01-10 PROCEDURE — 74011000258 HC RX REV CODE- 258: Performed by: HOSPITALIST

## 2018-01-10 PROCEDURE — 36415 COLL VENOUS BLD VENIPUNCTURE: CPT | Performed by: HOSPITALIST

## 2018-01-10 PROCEDURE — 77030027957 HC TBNG IRR ENDOGTR BUSS -B: Performed by: INTERNAL MEDICINE

## 2018-01-10 PROCEDURE — 0DJD8ZZ INSPECTION OF LOWER INTESTINAL TRACT, VIA NATURAL OR ARTIFICIAL OPENING ENDOSCOPIC: ICD-10-PCS | Performed by: INTERNAL MEDICINE

## 2018-01-10 PROCEDURE — 76060000031 HC ANESTHESIA FIRST 0.5 HR: Performed by: INTERNAL MEDICINE

## 2018-01-10 PROCEDURE — 74011000250 HC RX REV CODE- 250

## 2018-01-10 RX ORDER — LIDOCAINE HYDROCHLORIDE 20 MG/ML
INJECTION, SOLUTION EPIDURAL; INFILTRATION; INTRACAUDAL; PERINEURAL AS NEEDED
Status: DISCONTINUED | OUTPATIENT
Start: 2018-01-10 | End: 2018-01-10 | Stop reason: HOSPADM

## 2018-01-10 RX ORDER — PANTOPRAZOLE SODIUM 40 MG/1
40 TABLET, DELAYED RELEASE ORAL
Qty: 60 TAB | Refills: 0 | Status: SHIPPED | OUTPATIENT
Start: 2018-01-10 | End: 2018-01-26 | Stop reason: SDUPTHER

## 2018-01-10 RX ORDER — SODIUM CHLORIDE 9 MG/ML
INJECTION, SOLUTION INTRAVENOUS
Status: DISCONTINUED | OUTPATIENT
Start: 2018-01-10 | End: 2018-01-10 | Stop reason: HOSPADM

## 2018-01-10 RX ORDER — PROPOFOL 10 MG/ML
INJECTION, EMULSION INTRAVENOUS AS NEEDED
Status: DISCONTINUED | OUTPATIENT
Start: 2018-01-10 | End: 2018-01-10 | Stop reason: HOSPADM

## 2018-01-10 RX ADMIN — PROPOFOL 100 MG: 10 INJECTION, EMULSION INTRAVENOUS at 11:19

## 2018-01-10 RX ADMIN — LIDOCAINE HYDROCHLORIDE 100 MG: 20 INJECTION, SOLUTION EPIDURAL; INFILTRATION; INTRACAUDAL; PERINEURAL at 11:19

## 2018-01-10 RX ADMIN — SODIUM CHLORIDE 50 ML/HR: 450 INJECTION, SOLUTION INTRAVENOUS at 04:15

## 2018-01-10 RX ADMIN — Medication 10 ML: at 14:00

## 2018-01-10 RX ADMIN — Medication 10 ML: at 07:31

## 2018-01-10 RX ADMIN — SODIUM CHLORIDE: 9 INJECTION, SOLUTION INTRAVENOUS at 10:55

## 2018-01-10 NOTE — ANESTHESIA PREPROCEDURE EVALUATION
Anesthetic History               Review of Systems / Medical History  Patient summary reviewed, nursing notes reviewed and pertinent labs reviewed    Pulmonary          Shortness of breath         Neuro/Psych              Cardiovascular    Hypertension              Exercise tolerance: >4 METS     GI/Hepatic/Renal       Hepatitis: type C    Liver disease    Comments: Gi bleed  Cirrhosis  varicies Endo/Other        Morbid obesity and arthritis     Other Findings            Physical Exam    Airway  Mallampati: II  TM Distance: 4 - 6 cm    Mouth opening: Normal     Cardiovascular    Rhythm: regular  Rate: normal         Dental  No notable dental hx       Pulmonary  Breath sounds clear to auscultation               Abdominal         Other Findings            Anesthetic Plan    ASA: 3  Anesthesia type: MAC          Induction: Intravenous  Anesthetic plan and risks discussed with: Patient

## 2018-01-10 NOTE — PERIOP NOTES
TRANSFER - IN REPORT:    Verbal report received from Conrado Guevara on [address]  being received from [phone] for ordered procedure      Report consisted of patients Situation, Background, Assessment and   Recommendations(SBAR). Information from the following report(s) SBAR was reviewed with the receiving nurse. Opportunity for questions and clarification was provided. Assessment completed upon patients arrival to unit and care assumed.

## 2018-01-10 NOTE — INTERDISCIPLINARY ROUNDS
IDR/SLIDR Summary          Patient: Zoey Bell MRN: [id number]    Age: 58 y.o. YOB: 1955 Room/Bed: [facility]   Admit Diagnosis: GI bleed  GI Bleed  GI Bleed  Principal Diagnosis: GI bleed   Goals: safety  Readmission: NO  Quality Measure: Not applicable  VTE Prophylaxis: Mechanical  Influenza Vaccine screening completed? YES  Pneumococcal Vaccine screening completed? NO  Mobility needs: No   Nutrition plan:Yes  Consults:Case Management    Financial concerns:Yes  Escalated to CM? YES  RRAT Score: 19   Interventions:  Testing due for pt today?  YES  LOS: 2 days Expected length of stay 4 days  Discharge plan: home   PCP: Ward Pope MD  Transportation needs: No    Days before discharge:two or more days before discharge   Discharge disposition: Home    Signed:     Christie Hernández  1/10/2018  9:33 AM

## 2018-01-10 NOTE — ROUTINE PROCESS
Juliana Miranda  1955  [id number]    Situation:  Verbal report received from: Tia Cochran  Procedure: Procedure(s):  COLONOSCOPY    Background:    Preoperative diagnosis: GI Bleed  Postoperative diagnosis: 1. Inadequate Prep    :  Dr. Joe Bedolla  Assistant(s): Endoscopy Technician-1: Matthew Taylor  Endoscopy RN-1: Zuleyma Arroyo    Specimens: * No specimens in log *  H. Pylori  no    Assessment:  Intra-procedure medications   Anesthesia gave intra-procedure sedation and medications, see anesthesia flow sheet yes    Intravenous fluids: NS@ KVO     Vital signs stable     Abdominal assessment: round and soft     Recommendation:  Return to floor

## 2018-01-10 NOTE — ROUTINE PROCESS
Bedside shift change report given to Randal Gavin  (oncoming nurse) by Jesus Henriquez (offgoing nurse).  Report included the following information SBAR, Kardex, Procedure Summary, MAR, Accordion, Recent Results and Cardiac Rhythm Srady/ NSR.

## 2018-01-10 NOTE — ROUTINE PROCESS
TRANSFER - IN REPORT:    Verbal report received from [facility] (name) on Bruce Lubin  being received from [facility] (unit) for routine post - op      Report consisted of patients Situation, Background, Assessment and   Recommendations(SBAR). Information from the following report(s) SBAR, Kardex, Procedure Summary, MAR, Accordion, Recent Results and Cardiac Rhythm SHEN Tsai was reviewed with the receiving nurse. Opportunity for questions and clarification was provided. Assessment completed upon patients arrival to unit and care assumed.

## 2018-01-10 NOTE — ROUTINE PROCESS
Bedside shift change report given to Adamaris Ferrer (oncoming nurse) by Puneet Martínez (offgoing nurse). Report included the following information SBAR, Kardex, Intake/Output, MAR, Accordion, Recent Results and Cardiac Rhythm NSR.

## 2018-01-10 NOTE — PROCEDURES
[address], [address] [place] [address]        Flexible Sigmoidoscopy Operative Report    Tucker Alonso  [id number]  1955      Procedure Type:   Flexible Sigmoidoscopy    Indications:  Melena, anemia        Pre-operative Diagnosis: see indication above    Post-operative Diagnosis:  See findings below    :  Mayur Ortega MD      Referring Provider: Arjun Urias MD      Sedation:  MAC anesthesia Propofol      Procedure Details:  After informed consent was obtained with all risks and benefits of procedure explained and preoperative exam completed, the patient was taken to the endoscopy suite and placed in the left lateral decubitus position. Upon sequential sedation as per above, a digital rectal exam was performed demonstrating internal hemorrhoids. The Olympus pediatric videocolonoscope  was inserted in the rectum and carefully advanced to the sigmoid colon. The quality of preparation was inadequate. The colonoscope was slowly withdrawn with careful evaluation between folds. Retroflexion in the rectum was completed . Findings:   Rectum: brown stool  Sigmoid: brown stool  Procedure was aborted      Specimen Removed:  none    Complications: None. EBL:  None. Impression:   Inadequate preparation    Recommendations:  She does not appear to be bleeding at this time. I would advise repeat colonoscopy as an outpatient. Office visit first to explain preparation instructions. Stable for discharge today from GI perspective. Continue PPI for two months. Signed By: Mayur Ortega MD     1/10/2018  11:24 AM

## 2018-01-10 NOTE — PROGRESS NOTES
Hospitalist Progress Note  Cruz Garcia MD  Answering service: [phone] from in house phone  Cell: [phone]      Date of Service:  1/10/2018  NAME:  Juliana Miranda  :  1955  MRN:  [id number]      Admission Summary:   60-year-old female with past medical history of liver cirrhosis, chronic HSV, esophageal varices, arthritis, presented to the Emergency Department from home with chief complaint of chest pain and vomiting blood (hematemesis). The patient noted onset of symptoms starting last night at approximately 11:00 p.m. when she started vomiting dark blood. She notes that episodes were of large quantity. She had nausea and vomiting. She denied abdominal pain. She reports having left-sided chest pain, mild to moderate severity, rated approximately 6/10, intermittent without specific exacerbating or alleviating factors making. She had episodes of generalized weakness, dizziness, lightheadedness. There was no reported syncope, loss of consciousness, headache, neck pain, back pain, abdominal pain, melena, diarrhea, dysuria, hematuria, calf pain, swelling, edema, fever, chills, rash. The patient notably had been followed by a hepatologist, Dr. Shanelle Camarena. She was last hospitalized 2017 to 2017, with anemia, and was transfused with packed red blood cells. Patient underwent an EGD with gastroesophageal and gastric variceal banding on 08/10/2017, by Dr. Shanelle Camarena, with findings of esophageal varices without bleeding and portal hypertensive gastropath    Interval history / Subjective:     F/u GI bleed   Could not get colonoscopy   Assessment & Plan:     GI bleed  -S/p EGD  Multiple gastric erosions - non-bleeding. Portal hypertensive gastropathy (non-bleeding).  Low grade esophageal varix - no stigmata of recent bleeding  -PPI, continue  -Appreciate GI input, recommended colonoscopy as Hb dropped further  -But the patient could not get colonoscopy as the patient was not prepped    Acute blood loss anemia  -Monitor H/H    Pancytopenia  -with leukopenia/thrombocytopenia  -likely sec to cirrhosis  -Monitor    Acute chest pain  -monitor    Nausea and vomiting  -Symptomatic management, seems better today    History of cirrhosis  -with history of gastric varices s/p banding  -Chronic Hep C  -Awaiting Hepatology input  -on Propranolol/Aldactone/Lasix    Cardiac diet    Code status: FULL CODE  DVT prophylaxis: scd    Plan: Spoke to Dr Yasmine Lal, greatly appreciate rec. The patient can be discharged from GI standpoint. Will discharge the patient    Care Plan discussed with: Patient/Family  Disposition: Home w/Family     Hospital Problems  Date Reviewed: 1/8/2018          Codes Class Noted POA    * (Principal)GI bleed ICD-10-CM: K92.2  ICD-9-CM: 578.9  1/8/2018 Unknown                Review of Systems:   A comprehensive review of systems was negative except for that written in the HPI. Vital Signs:    Last 24hrs VS reviewed since prior progress note. Most recent are:  Visit Vitals    /58 (BP 1 Location: Right arm, BP Patient Position: At rest;Sitting)    Pulse (!) 59    Temp 97.8 °F (36.6 °C)    Resp 16    Ht 5' (1.524 m)    Wt 98.2 kg (216 lb 9.6 oz)    SpO2 99%    BMI 42.3 kg/m2         Intake/Output Summary (Last 24 hours) at 01/10/18 1251  Last data filed at 01/10/18 1122   Gross per 24 hour   Intake              250 ml   Output                0 ml   Net              250 ml        Physical Examination:             Constitutional:  No acute distress, cooperative, pleasant    ENT:  Oral mucous moist, oropharynx benign. Neck supple,    Resp:  CTA bilaterally. No wheezing/rhonchi/rales. No accessory muscle use   CV:  Regular rhythm, normal rate, no murmurs, gallops, rubs    GI:  Soft, non distended, non tender.  normoactive bowel sounds, no hepatosplenomegaly     Musculoskeletal:  No edema, warm, 2+ pulses throughout Neurologic:  Moves all extremities. AAOx3, CN II-XII reviewed     Skin:  Good turgor, no rashes or ulcers       Data Review:    Review and/or order of clinical lab test      Labs:     Recent Labs      01/10/18   0318  01/09/18   0310   WBC  2.7*  2.1*   HGB  8.1*  7.5*   HCT  26.1*  24.3*   PLT  60*  50*     Recent Labs      01/10/18   0318  01/09/18   0310  01/08/18   0452   NA  143  145  146*   K  3.3*  3.5  4.4   CL  111*  112*  115*   CO2  25  27  22   BUN  12  19  22*   CREA  0.56  0.61  0.44*   GLU  99  92  119*   CA  7.9*  7.7*  7.9*     Recent Labs      01/08/18 0126   SGOT  35   ALT  45   AP  137*   TBILI  0.9   TP  6.4   ALB  2.7*   GLOB  3.7     Recent Labs      01/08/18 0452  01/08/18   0142   INR  1.3*  1.2*   PTP  12.8*   --       No results for input(s): FE, TIBC, PSAT, FERR in the last 72 hours. Lab Results   Component Value Date/Time    Folate >20.0 09/29/2017 12:00 AM      No results for input(s): PH, PCO2, PO2 in the last 72 hours.   Recent Labs      01/08/18 0126   TROIQ  <0.04     No results found for: CHOL, CHOLX, CHLST, CHOLV, HDL, LDL, LDLC, DLDLP, TGLX, TRIGL, TRIGP, CHHD, CHHDX  Lab Results   Component Value Date/Time    Glucose (POC) 141 01/08/2018 01:41 AM     Lab Results   Component Value Date/Time    Color YELLOW 11/06/2016 12:23 PM    Appearance CLOUDY 11/06/2016 12:23 PM    Specific gravity 1.025 11/06/2016 12:23 PM    pH (UA) 6.0 11/06/2016 12:23 PM    Protein 30 11/06/2016 12:23 PM    Glucose NEGATIVE  11/06/2016 12:23 PM    Ketone TRACE 11/06/2016 12:23 PM    Urobilinogen 1.0 11/06/2016 12:23 PM    Nitrites NEGATIVE  11/06/2016 12:23 PM    Leukocyte Esterase SMALL 11/06/2016 12:23 PM    Epithelial cells FEW 11/06/2016 12:23 PM    Bacteria NEGATIVE  11/06/2016 12:23 PM    WBC 0-4 11/06/2016 12:23 PM    RBC >100 11/06/2016 12:23 PM         Medications Reviewed:     Current Facility-Administered Medications   Medication Dose Route Frequency    sodium chloride (NS) flush 5-10 mL  5-10 mL IntraVENous Q8H    sodium chloride (NS) flush 5-10 mL  5-10 mL IntraVENous PRN    pantoprazole (PROTONIX) tablet 40 mg  40 mg Oral ACB&D    0.45% sodium chloride infusion  50 mL/hr IntraVENous CONTINUOUS    lactulose (CHRONULAC) solution 10 g  15 mL Oral BID    propranolol (INDERAL) tablet 10 mg  10 mg Oral TID    lisinopril (PRINIVIL, ZESTRIL) tablet 20 mg  20 mg Oral DAILY    sucralfate (CARAFATE) 100 mg/mL oral suspension 1 g  1 g Oral Q6H    furosemide (LASIX) tablet 40 mg  40 mg Oral DAILY    spironolactone (ALDACTONE) tablet 100 mg  100 mg Oral DAILY     ______________________________________________________________________  EXPECTED LENGTH OF STAY: 2d 4h  ACTUAL LENGTH OF STAY:          2                 Timothy Morton MD
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
## 2018-01-10 NOTE — PROGRESS NOTES
Referred to patient by tera Alaniz. Patient stated that she has had a very hard life and that she feels that she doesn't have much time left in this world. Patient also stated that she doesn't want to be a burden on society and her . She feels like she's a burden since she can't work anymore. Her  is 10 years younger and she feels like she's a burden to him. Patient is documented,  isn't. Patient is an established patient at the [facility]. Will send an email to Dayana Negron Nurse Navigator for follow up care and follow up with Care Card application.

## 2018-01-10 NOTE — PERIOP NOTES

## 2018-01-10 NOTE — PROGRESS NOTES
Primary Nurse Yanna Monet and Sukhjinder Arguello, [place] performed a dual skin assessment on this patient No impairment noted  Joey score is 22

## 2018-01-10 NOTE — PERIOP NOTES
TRANSFER - OUT REPORT:    Verbal report given to Lisseth Soriano  being transferred to [phone] for routine progression of care       Report consisted of patients Situation, Background, Assessment and   Recommendations(SBAR). Information from the following report(s) SBAR and Procedure Summary was reviewed with the receiving nurse. Lines:   Peripheral IV 01/08/18 Right Hand (Active)   Site Assessment Clean, dry, & intact 1/10/2018  8:00 AM   Phlebitis Assessment 0 1/10/2018  8:00 AM   Infiltration Assessment 0 1/10/2018  8:00 AM   Dressing Status Clean, dry, & intact 1/10/2018  8:00 AM   Dressing Type Transparent;Tape 1/10/2018  8:00 AM   Hub Color/Line Status Pink; Infusing 1/10/2018  8:00 AM   Action Taken Open ports on tubing capped 1/10/2018  8:00 AM   Alcohol Cap Used Yes 1/10/2018  8:00 AM        Opportunity for questions and clarification was provided.       Patient transported with:   [employer]

## 2018-01-10 NOTE — ANESTHESIA POSTPROCEDURE EVALUATION
Post-Anesthesia Evaluation and Assessment    Patient: Pedro Bishop MRN: [id number]  SSN: [id number]    YOB: 1955  Age: 58 y.o. Sex: female       Cardiovascular Function/Vital Signs  Visit Vitals    /58 (BP 1 Location: Right arm, BP Patient Position: At rest;Sitting)    Pulse (!) 59    Temp 36.6 °C (97.8 °F)    Resp 16    Ht 5' (1.524 m)    Wt 98.2 kg (216 lb 9.6 oz)    SpO2 99%    BMI 42.3 kg/m2       Patient is status post MAC anesthesia for Procedure(s):  COLONOSCOPY. Nausea/Vomiting: None    Postoperative hydration reviewed and adequate. Pain:  Pain Scale 1: Numeric (0 - 10) (01/10/18 1221)  Pain Intensity 1: 0 (01/10/18 1221)   Managed    Neurological Status:   Neuro  Neurologic State: Alert (01/10/18 1230)  Orientation Level: Oriented X4 (01/10/18 1230)  Cognition: Appropriate for age attention/concentration; Follows commands (01/10/18 1230)  Speech: Clear (01/10/18 1230)  Assessment L Pupil: Brisk;Round (01/10/18 1230)  Size L Pupil (mm): 3 (01/10/18 1230)  Assessment R Pupil: Brisk;Round (01/10/18 1230)  Size R Pupil (mm): 3 (01/10/18 1230)  LUE Motor Response: Purposeful (01/10/18 1230)  LLE Motor Response: Purposeful (01/10/18 1230)  RUE Motor Response: Purposeful (01/10/18 1230)  RLE Motor Response: Purposeful (01/10/18 1230)   At baseline    Mental Status and Level of Consciousness: Arousable    Pulmonary Status:   O2 Device: Room air (01/10/18 1230)   Adequate oxygenation and airway patent    Complications related to anesthesia: None    Post-anesthesia assessment completed.  No concerns    Signed By: Laisha Arias MD     January 10, 2018

## 2018-01-10 NOTE — DISCHARGE INSTRUCTIONS
Discharge Instructions       PATIENT ID: Merissa Dooley  MRN: [id number]   YOB: 1955    DATE OF ADMISSION: 1/8/2018  1:06 AM    DATE OF DISCHARGE: 1/10/2018    PRIMARY CARE PROVIDER: Griselda Hawk MD     ATTENDING PHYSICIAN: Solo Boucher MD  DISCHARGING PROVIDER: Solo Boucher MD    To contact this individual call [phone] and ask the  to page. If unavailable ask to be transferred the Adult Hospitalist Department. DISCHARGE DIAGNOSES   Gi bleed    CONSULTATIONS: IP CONSULT TO GASTROENTEROLOGY  IP CONSULT TO HEPATOLOGY  IP CONSULT TO HOSPITALIST    PROCEDURES/SURGERIES: Procedure(s):  COLONOSCOPY    PENDING TEST RESULTS:   At the time of discharge the following test results are still pending: none    FOLLOW UP APPOINTMENTS:   Follow-up Information     Follow up With Details Comments Contact Info    Griselda Hawk MD In 1 week  [facility] [address] On license of [facility]      [id number] [place] [address] [address] Robe Saenz MD In 1 week  [facility] [address] [facility]  [phone]             ADDITIONAL CARE RECOMMENDATIONS:   Follow up with PMD as well as GI for outpatient colonoscopy    DIET: Cardiac Diet    ACTIVITY: Activity as tolerated      DISCHARGE MEDICATIONS:   See Medication Reconciliation Form    · It is important that you take the medication exactly as they are prescribed. · Keep your medication in the bottles provided by the pharmacist and keep a list of the medication names, dosages, and times to be taken in your wallet. · Do not take other medications without consulting your doctor. NOTIFY YOUR PHYSICIAN FOR ANY OF THE FOLLOWING:   Fever over 101 degrees for 24 hours. Chest pain, shortness of breath, fever, chills, nausea, vomiting, diarrhea, change in mentation, falling, weakness, bleeding. Severe pain or pain not relieved by medications.   Or, any other signs or symptoms that you may have questions about.      DISPOSITION:   x Home With:   OT  PT  HH  RN       SNF/Inpatient Rehab/LTAC    Independent/assisted living    Hospice    Other:     CDMP Checked:   Yes x     PROBLEM LIST Updated:  Yes x       Signed:   Tiffanie Keane MD  1/10/2018  1:01 PM

## 2018-01-10 NOTE — ROUTINE PROCESS
I have reviewed discharge instructions with the patient. The patient verbalized understanding. Bedside RN performed patient education and medication education. Discharge concerns initiated and discussed with patient, including clarification on \"who\" assists the patient at their home and instructions for when the home going patient should call their provider after discharge. Opportunity for questions and clarification was provided. Patient receptive to education: YES  Patient stated: \"My  is an hour and a half away\"  Barriers to Education: Language  Diagnosis Education given:  NO    Length of stay: 2  Expected Day of Discharge: Today  Ask if they have \"Help at Home\" & add to white board?   YES    Education Day #: 1    Medication Education Given:  YES  M in the box Medication name: Protonix    Pt aware of HCAHPS survey: YES

## 2018-01-10 NOTE — PROGRESS NOTES
Received in [facility] report that patient does not want to have colonoscopy. Patient claims  is out of work and she is concerned about the expense of another test. [facility] nurse explained the importance of having the test to find a possible source of bleeding. 2230 Patient stated that her stomach was full and she couldn't drink any more Go-Lytely and it was making her stomach \"rumble\". Explained to her again that this test is very important in finding a possible source of bleeding. Also educated that if we proceed with test and find the source, the doctor's will be able to fix the problem which will hopefully help prevent her from coming back to the hospital or needing a blood transfusion. Asked patient to do the best she could in drinking as much as she could. 0000 Patient has only drank half the bottle and stools are loose but not clear. Will continue to assess stools. 0315 While getting vitals and labs, patient began coughing. Asked if feeling ok and patient stated she was having a difficult time breathing. Auscultated lungs and sounds clear with no wheezing, O2 99% on room air. No history of asthma. Asked patient if she was feeling anxious and she stated she was not. Paged out to hospitalist    5412 Paged hospitalist    3922 Reassessed patient and she is not coughing as much. When asked how she feels, she said she feels much better. Told her I would still speak with the doctor to make him aware and would continue to check in on her. 0400 Patient is continuing to feel better, not much coughing at this point. 0410 Checked on patient and she is resting in bed and said she feels much better and not really coughing anymore. Told her I would still let the doctor know and she said \"I feel much better. You don't need to tell the doctor\". I told her that I would let him know anyway to keep him informed. Patient is resting in bed with call bell in reach.  Will continue to check on her.    5403 Spoke with hospitalist. Informed of symptoms patient was presenting with but have now resolved. Patient is resting comfortably. No new orders given.

## 2018-01-10 NOTE — PROGRESS NOTES
Follow-up visit with patient in 80. Patient shared that she is feeling much better than yesterday and excited to be going home. Spent time discussing patient's plans for continued self-care once she is home including following up with her doctor and pursuing activities that re-establish some independence and self-confidence. 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 Patient is hoping to be able to return to this line of work even as a volunteer. Explored patient's fears of being restricted to her home and losing more of her own identity. Spent time identifying coping resources and self-care strategies. Assured patient of ongoing prayer and continued support. Please page as needed and desired. tera Harvey M.Div.    Paging Service [phone])

## 2018-01-11 ENCOUNTER — PATIENT OUTREACH (OUTPATIENT)
Dept: FAMILY MEDICINE CLINIC | Age: 63
End: 2018-01-11

## 2018-01-11 NOTE — PROGRESS NOTES
1/1/18  NN left message for Merissa Dooley with contact and hours of operation. 1/22/18  NN left a message for Merissa Dooley with contact information and hours of operation.

## 2018-01-12 LAB
ABO + RH BLD: NORMAL
ANTIGENS PRESENT RBC DONR: NORMAL
ANTIGENS PRESENT RBC DONR: NORMAL
BLD PROD TYP BPU: NORMAL
BLD PROD TYP BPU: NORMAL
BLOOD BANK CMNT PATIENT-IMP: NORMAL
BLOOD GROUP ANTIBODIES SERPL: NORMAL
BLOOD GROUP ANTIBODIES SERPL: NORMAL
BPU ID: NORMAL
BPU ID: NORMAL
CROSSMATCH RESULT,%XM: NORMAL
CROSSMATCH RESULT,%XM: NORMAL
SPECIMEN EXP DATE BLD: NORMAL
STATUS OF UNIT,%ST: NORMAL
STATUS OF UNIT,%ST: NORMAL
UNIT DIVISION, %UDIV: 0
UNIT DIVISION, %UDIV: 0

## 2018-01-19 ENCOUNTER — TELEPHONE (OUTPATIENT)
Dept: FAMILY MEDICINE CLINIC | Age: 63
End: 2018-01-19

## 2018-01-19 NOTE — TELEPHONE ENCOUNTER
Returned pt phone call with [employer]  [phone] and ARUNA. I explained to pt that CBN is in office only half day today and that if she returns call after lunch she should try to reach [facility], who called pt on 1/11/18. I left her [facility] 85 office number again and left Suzan's phone number as well.  Liliana Page RN

## 2018-01-19 NOTE — TELEPHONE ENCOUNTER
Patient is calling to inform us that she went to [facility], she is not feeling well - needing to be triaged at this time. She was supposed to have called us but didn't for her f/u.

## 2018-01-22 ENCOUNTER — PATIENT OUTREACH (OUTPATIENT)
Dept: FAMILY MEDICINE CLINIC | Age: 63
End: 2018-01-22

## 2018-01-26 ENCOUNTER — OFFICE VISIT (OUTPATIENT)
Dept: FAMILY MEDICINE CLINIC | Age: 63
End: 2018-01-26

## 2018-01-26 VITALS
DIASTOLIC BLOOD PRESSURE: 70 MMHG | SYSTOLIC BLOOD PRESSURE: 165 MMHG | TEMPERATURE: 97.5 F | BODY MASS INDEX: 40.35 KG/M2 | HEART RATE: 77 BPM | WEIGHT: 206.6 LBS

## 2018-01-26 DIAGNOSIS — I10 ESSENTIAL HYPERTENSION: ICD-10-CM

## 2018-01-26 DIAGNOSIS — B18.2 CHRONIC HEPATITIS C WITHOUT HEPATIC COMA (HCC): ICD-10-CM

## 2018-01-26 DIAGNOSIS — R60.9 EDEMA, UNSPECIFIED TYPE: ICD-10-CM

## 2018-01-26 DIAGNOSIS — K76.6 PORTAL HYPERTENSION (HCC): ICD-10-CM

## 2018-01-26 DIAGNOSIS — D50.0 BLOOD LOSS ANEMIA: Primary | ICD-10-CM

## 2018-01-26 DIAGNOSIS — I85.01 BLEEDING ESOPHAGEAL VARICES, UNSPECIFIED ESOPHAGEAL VARICES TYPE (HCC): ICD-10-CM

## 2018-01-26 RX ORDER — PANTOPRAZOLE SODIUM 40 MG/1
40 TABLET, DELAYED RELEASE ORAL
Qty: 60 TAB | Refills: 0 | Status: SHIPPED | OUTPATIENT
Start: 2018-01-26 | End: 2018-02-25

## 2018-01-26 RX ORDER — SPIRONOLACTONE 100 MG/1
100 TABLET, FILM COATED ORAL DAILY
Qty: 90 TAB | Refills: 3 | Status: SHIPPED | OUTPATIENT
Start: 2018-01-26 | End: 2018-01-26 | Stop reason: SDUPTHER

## 2018-01-26 RX ORDER — FUROSEMIDE 40 MG/1
40 TABLET ORAL DAILY
Qty: 90 TAB | Refills: 3 | Status: SHIPPED | OUTPATIENT
Start: 2018-01-26 | End: 2018-01-26 | Stop reason: SDUPTHER

## 2018-01-26 RX ORDER — PANTOPRAZOLE SODIUM 40 MG/1
40 TABLET, DELAYED RELEASE ORAL
Qty: 60 TAB | Refills: 0 | Status: SHIPPED | OUTPATIENT
Start: 2018-01-26 | End: 2018-01-26 | Stop reason: SDUPTHER

## 2018-01-26 RX ORDER — LACTULOSE 10 G/15ML
10 SOLUTION ORAL 2 TIMES DAILY
Qty: 1000 ML | Refills: 1 | Status: SHIPPED | OUTPATIENT
Start: 2018-01-26 | End: 2018-01-26 | Stop reason: SDUPTHER

## 2018-01-26 RX ORDER — LACTULOSE 10 G/15ML
10 SOLUTION ORAL 2 TIMES DAILY
Qty: 1000 ML | Refills: 1 | Status: SHIPPED | OUTPATIENT
Start: 2018-01-26 | End: 2018-05-09 | Stop reason: SDUPTHER

## 2018-01-26 RX ORDER — PROPRANOLOL HYDROCHLORIDE 10 MG/1
10 TABLET ORAL 3 TIMES DAILY
Qty: 90 TAB | Refills: 3 | Status: SHIPPED | OUTPATIENT
Start: 2018-01-26 | End: 2018-01-26 | Stop reason: SDUPTHER

## 2018-01-26 RX ORDER — LISINOPRIL 20 MG/1
20 TABLET ORAL DAILY
Qty: 90 TAB | Refills: 1 | Status: SHIPPED | OUTPATIENT
Start: 2018-01-26 | End: 2018-05-10 | Stop reason: SDUPTHER

## 2018-01-26 RX ORDER — SPIRONOLACTONE 100 MG/1
100 TABLET, FILM COATED ORAL DAILY
Qty: 90 TAB | Refills: 3 | Status: SHIPPED | OUTPATIENT
Start: 2018-01-26 | End: 2018-05-09 | Stop reason: SDUPTHER

## 2018-01-26 RX ORDER — LISINOPRIL 20 MG/1
20 TABLET ORAL DAILY
Qty: 90 TAB | Refills: 1 | Status: SHIPPED | OUTPATIENT
Start: 2018-01-26 | End: 2018-01-26 | Stop reason: SDUPTHER

## 2018-01-26 RX ORDER — PROPRANOLOL HYDROCHLORIDE 10 MG/1
10 TABLET ORAL 3 TIMES DAILY
Qty: 90 TAB | Refills: 3 | Status: SHIPPED | OUTPATIENT
Start: 2018-01-26 | End: 2018-05-10

## 2018-01-26 RX ORDER — FUROSEMIDE 40 MG/1
40 TABLET ORAL DAILY
Qty: 90 TAB | Refills: 3 | Status: SHIPPED | OUTPATIENT
Start: 2018-01-26 | End: 2018-05-09 | Stop reason: SDUPTHER

## 2018-01-26 NOTE — MR AVS SNAPSHOT
[facility] 
 
 
 [address] [facility] 57 
[phone] Patient: Wayne Ortzi MRN: [id number] Visit Information Bradford Orr Personal Médico Departamento Teléfono del Dep. Número de visita 1/26/2018  1:30 PM Michele Vila NP [facility] [phone] Your Appointments 2/22/2018 11:00 AM  
PROCEDURE with MD María Elena John [address]) Appt Note: EGD; EGD  
 [facility] [phone] [facility] [address]  
[phone]  
  
   
 [address] [place] [address] [facility] [address]  
  
    
 3/8/2018  9:00 AM  
Follow Up with Halbert Buerger, LCSW [address] ([address] [facility]) Appt Note: New patient c/o sadness. [address] [facility] [address]  
[phone]  
  
   
 [address] [place] [phone] Upcoming Health Maintenance Date Due Pneumococcal 19-64 Medium Risk (1 of 1 - PPSV23) 3/5/1974 DTaP/Tdap/Td series (1 - Tdap) 3/5/1976 PAP AKA CERVICAL CYTOLOGY 3/5/1976 BREAST CANCER SCRN MAMMOGRAM 3/5/2005 ZOSTER VACCINE AGE 60> 1/5/2015 FOBT Q 1 YEAR AGE 50-75 8/17/2018 Alergias  Review Complete El: 1/26/2018 Por: JAMIL Barker del:  1/26/2018 Intensidad Anotado Tipo de reacción [employer] Morphine  03/11/2011    Nausea and Vomiting, Other (comments) Histamine release? Red streak on arm after IV injection Vacunas actuales Revisadas el:  8/20/2017 Luiza Menendez Influenza Vaccine (Quad) PF 11/20/2017, 10/20/2016 No revisadas esta visita You Were Diagnosed With   
  
 Delrae Jester Blood loss anemia    -  Primary ICD-10-CM: D50.0 ICD-9-CM: 280.0 Edema, unspecified type     ICD-10-CM: R60.9 ICD-9-CM: 782.3  Essential hypertension     ICD-10-CM: I10 
 ICD-9-CM: 401.9 Bleeding esophageal varices, unspecified esophageal varices type (HCC)     ICD-10-CM: I85.01 
ICD-9-CM: 456.0 Portal hypertension (HCC)     ICD-10-CM: K76.6 ICD-9-CM: 508. 3 Chronic hepatitis C without hepatic coma (HCC)     ICD-10-CM: B18.2 ICD-9-CM: 070.54 Partes vitales PS Pulso Temperatura Peso (percentil de crecimiento) BMI (IMC) Estado obstétrico  
 165/70 (BP 1 Location: Right arm) 77 97.5 °F (36.4 °C) (Oral) 206 lb 9.6 oz (93.7 kg) 40.35 kg/m2 Postmenopausal  
 Estatus de tabaquísmo Former Smoker Historial de signos vitales BMI and BSA Data Body Mass Index Body Surface Area  
 40.35 kg/m 2 1.99 m 2 Kristin Alfonso Pharmacy Name Phone [phone], [address] [facility] [phone] Tam lista de medicamentos actualizada Lista actualizada el: 1/26/18  3:28 PM.  Jayden Apt use tam lista de medicamentos más reciente. ferrous sulfate 325 mg (65 mg iron) tablet Take 1 Tab by mouth two (2) times a day. furosemide 40 mg tablet También conocido ofelia:  LASIX Take 1 Tab by mouth daily. lactulose 20 gram/30 mL Soln solution También conocido ofelia:  Sierra Gregory Take 15 mL by mouth two (2) times a day. lisinopril 20 mg tablet También conocido ofelia:  Caroline Frankfort Take 1 Tab by mouth daily. Khmer instructions  
  
 pantoprazole 40 mg tablet También conocido ofelia:  PROTONIX Take 1 Tab by mouth Before breakfast and dinner for 30 days. propranolol 10 mg tablet También conocido ofelia:  INDERAL Take 1 Tab by mouth three (3) times daily. spironolactone 100 mg tablet También conocido ofelia:  ALDACTONE Take 1 Tab by mouth daily. Recetas Enviado a la [employer] Refills  
 pantoprazole (PROTONIX) 40 mg tablet 0 Sig: Take 1 Tab by mouth Before breakfast and dinner for 30 days. Class: Normal  
 Pharmacy: [address] [place] [address] [address] [place] [address] Ph #: [phone] Route: Oral  
 furosemide (LASIX) 40 mg tablet 3 Sig: Take 1 Tab by mouth daily. Class: Normal  
 Pharmacy: [address] [place] [address] 2047 [facility] Ph #: [phone] Route: Oral  
 lactulose (CHRONULAC) 20 gram/30 mL soln solution 1 Sig: Take 15 mL by mouth two (2) times a day. Class: Normal  
 Pharmacy: [address] [place] [address] [facility] Ph #: [phone] Route: Oral  
 lisinopril (PRINIVIL, ZESTRIL) 20 mg tablet 1 Sig: Take 1 Tab by mouth daily. Fijian instructions Class: Normal  
 Pharmacy: [address] [place] [address]7 [facility] Ph #: [phone] Route: Oral  
 propranolol (INDERAL) 10 mg tablet 3 Sig: Take 1 Tab by mouth three (3) times daily. Class: Normal  
 Pharmacy: [address] [place] [address] 37[address] [facility] Ph #: [phone] Route: Oral  
 spironolactone (ALDACTONE) 100 mg tablet 3 Sig: Take 1 Tab by mouth daily. Class: Normal  
 Pharmacy: [address] [place] [address] [facility] Ph #: [phone] Route: Oral  
  
Hicimos lo siguiente AMB POC HEMOGLOBIN (HGB) [[address] CPT(R)] Instrucciones para el Paciente Várices esofágicas: Instrucciones de cuidado - [ Esophageal Varices: Care Instructions ] Instrucciones de cuidado Las várices esofágicas son venas que se encuentran en el esófago que son [place] de lo normal. Hinojosa esófago es un tubo. Transporta los alimentos desde la garganta al [facility]. Estas venas se agrandan por un aumento de presión.  Esta presión hace que las church de las venas se debiliten. Entonces pueden romperse y causar un sangrado muy grave. Marian problema suele encontrarse en personas que tienen lam enfermedad hepática grave. Los tratamientos [employer] y procedimientos para ayudar a reducir la presión en las venas. Hable con tam médico acerca de cuál es el tratamiento más adecuado para usted. Si tiene sangrado por [employer], hay un riesgo de que vuelva a ocurrir. En [place], es importante que vuelva para un seguimiento con tam médico. 
La atención de seguimiento es lam parte clave de tam tratamiento y seguridad. Asegúrese de hacer y acudir a todas las citas, y llame a tam médico si está teniendo problemas. También es lam buena idea saber los resultados de los exámenes y mantener lam lista de los medicamentos que bony. Cómo puede cuidarse en el hogar? · Sea oneil con los medicamentos. [employer] medicamentos exactamente ofelia le fueron recetados. Llame a tam médico si vi estar teniendo un problema con tam medicamento. Recibirá [employer] medicamentos específicos recetados por tam médico. 
· Hable con tam médico antes de monika otros medicamentos. Estos incluyen medicamentos de venta izabella, vitaminas y productos herbarios. · Evite la aspirina, el ibuprofeno (Advil, Motrin) y el naproxeno (Aleve). Estos pueden provocar úlceras en el estómago o en el esófago. · No tai alcohol. Aumenta el riesgo de sangrado. También puede empeorar el daño hepático. Avísele a tam médico si necesita ayuda para dejarlo. La asesoría psicológica, los grupos de apoyo y, a veces, algunos medicamentos pueden ayudarle a mantenerse sobrio. Cuándo debe pedir ayuda? Llame al 911 en cualquier momento que considere que necesita atención de [place]. Por ejemplo, llame si: 
? · Vomita fernanda o algo parecido a granos de café molido. ? · Destiney heces son de color rojizo o muy sanguinolentas (con fernanda). ? · Se desmayó (perdió el conocimiento). ? · Se siente muy somnoliento (con sueño). ? Llame a tam médico ahora mismo o busque atención médica inmediata si: 
? · Siente mareo o aturdimiento, o siente que se va a desmayar. ? · Destiney heces son negruzcas y parecidas al alquitrán o tienen rastros de Christopher. ? · Tiene problemas para respirar. ?Preste especial atención a los cambios en tam sreekanth y asegúrese de comunicarse con tam médico si tiene algún problema. Dónde puede encontrar más información en inglés? Liudmila Sousa a http://constance-brian.[email or web address] July Herrera E566 en la búsqueda para aprender más acerca de \"Várices esofágicas: Instrucciones de cuidado - [ Esophageal Varices: Care Instructions ]. \" 
Revisado: 12 olson, 2017 Versión del contenido: 11.4 © 2131-5879 Healthwise, Incorporated. Las instrucciones de cuidado fueron adaptadas bajo licencia por Good 1-800-DOCTORS Connections (which disclaims liability or warranty for this information). Si usted tiene [place] afección médica o sobre estas instrucciones, siempre pregunte a tam profesional de sreekanth. Healthwise, Incorporated niega toda garantía o responsabilidad por tam uso de esta información. Introducing [facility]! [facility] introduce portal paciente MyChart . Ahora se puede acceder a partes de tam expediente médico, enviar por correo electrónico la oficina de tam médico y solicitar renovaciones de medicamentos en línea. En tam navegador de Internet , Stan Auguste a [email or web address] Sheeba clic en el usuario por Lanetta Filter? Glenice Simmonds corinic aquí en la sesión Rosalind Commander. Verá la página de registro [place]. Ingrese tam código de [facility] magalys y ofelia aparece a continuación. Usted no tendrá que [employer] código después de sukhdeep completado el proceso de registro . Si ted no se inscribe antes de la fecha de caducidad , debe solicitar un nuevo código. · MyChart Código de acceso : [id number] Expires: 2/18/2018  3:05 PM 
 
 Ingresa los últimos cuatro dígitos de tam Número de Seguro Social ( xxxx ) y fecha de nacimiento ( dd / mm / aaaa ) ofelia se indica y sheeba clic en Enviar. Usted será llevado a la siguiente página de registro . Crear un ID MyChart . Esta será tam ID de inicio de sesión de MyChart y no puede ser [place] , por lo que pensar en lam que es Marisue Daubs y fácil de recordar . Crear lam contraseña MyChart . Usted puede cambiar tam contraseña en cualquier momento . Ingrese tam Password Reset de preguntas y [place] . [place] se puede utilizar en un momento posterior si usted olvida tam contraseña. Introduzca tam dirección de correo electrónico . Aga Red recibirá lam notificación por correo electrónico cuando la nueva información está disponible en MyChart . Jairo Brilliant clic en Registrarse. Meryle Brown víctor y descargar porciones de tam expediente médico. 
Sheeba clic en el enlace de descarga del menú Resumen para descargar lam copia portátil de tam información médica . Si tiene [employer] , por favor visite la sección de preguntas frecuentes del sitio web MyChart . Recuerde, MyChart NO es que se utilizará para las necesidades urgentes. Para emergencias médicas , llame al 911 . Ahora disponible en tam iPhone y Android ! Por favor proporcione tameka resumen de la documentación de cuidado a tam próximo proveedor. Your primary care clinician is listed as Delaney Licona. If you have any questions after today's visit, please call [phone].

## 2018-01-26 NOTE — PROGRESS NOTES
Coordination of Care  1. Have you been to the ER, urgent care clinic since your last visit? Hospitalized since your last visit? Yes When: [facility]01/2018, vomiting blood    2. Have you seen or consulted any other health care providers outside of the [address] [place] [address] since your last visit? Include any pap smears or colon screening. No    Medications  Does the patient need refills? YES    Learning Assessment Complete?  yes

## 2018-01-26 NOTE — PROGRESS NOTES
Patient seen for discharge with assistance from claudette Field. We reviewed the AVS and the Good Rx prices for all of her prescriptions. There will be an overall savings of about $23 by moving them all from [facility] to [facility]. The patient agreed and the prescriptions were moved to her chosen [facility] in [place]. She was given the Good Rx coupon card and the prices for each prescription were written on her AVS. (Trouble printing individual coupons from the site) She has been to registration to schedule a provider follow-up appointment.  Rei Donato RN

## 2018-01-26 NOTE — PATIENT INSTRUCTIONS
Várices esofágicas: Instrucciones de cuidado - [ Esophageal Varices: Care Instructions ]  Instrucciones de cuidado    Las várices esofágicas son venas que se encuentran en el esófago que son más grandes de lo normal. Tam esófago es un tubo. Transporta los alimentos desde la garganta al [facility]. Estas venas se agrandan por un aumento de presión. Esta presión hace que las church de las venas se debiliten. Entonces pueden romperse y causar un sangrado muy grave. Marian problema suele encontrarse en personas que tienen lam enfermedad hepática grave. Los tratamientos [employer] y procedimientos para ayudar a reducir la presión en las venas. Hable con tam médico acerca de cuál es el tratamiento más adecuado para usted. Si tiene sangrado por [employer], hay un riesgo de que vuelva a ocurrir. En [place], es importante que vuelva para un seguimiento con tam médico.  La atención de seguimiento es lam parte clave de tam tratamiento y seguridad. Asegúrese de hacer y acudir a todas las citas, y llame a tam médico si está teniendo problemas. También es lam buena idea saber los resultados de los exámenes y mantener lam lista de los medicamentos que bony. ¿Cómo puede cuidarse en el hogar? · Sea oneil con los medicamentos. [employer] medicamentos exactamente ofelia le fueron recetados. Llame a tam médico si vi estar teniendo un problema con tam medicamento. Recibirá [employer] medicamentos específicos recetados por tam médico.  · Hable con tam médico antes de monika otros medicamentos. Estos incluyen medicamentos de venta izabella, vitaminas y productos herbarios. · Evite la aspirina, el ibuprofeno (Advil, Motrin) y el naproxeno (Aleve). Estos pueden provocar úlceras en el estómago o en el esófago. · No tai alcohol. Aumenta el riesgo de sangrado. También puede empeorar el daño hepático. Avísele a tam médico si necesita ayuda para dejarlo.  La asesoría psicológica, los grupos de apoyo y, a veces, [employer] medicamentos pueden ayudarle a mantenerse sobrio. ¿Cuándo debe pedir ayuda? Llame al 911 en cualquier momento que considere que necesita atención de [place]. Por ejemplo, llame si:  ? · Vomita fernanda o algo parecido a granos de café molido. ? · Destiney heces son de color rojizo o muy sanguinolentas (con fernanda). ? · Se desmayó (perdió el conocimiento). ? · Se siente muy somnoliento (con sueño). ? Llame a tam médico ahora mismo o busque atención médica inmediata si:  ? · Siente mareo o aturdimiento, o siente que se va a desmayar. ? · Destiney heces son negruzcas y parecidas al alquitrán o tienen rastros de Christopher. ? · Tiene problemas para respirar. ?Preste especial atención a los cambios en tam sreekanth y asegúrese de comunicarse con tam médico si tiene algún problema. ¿Dónde puede encontrar más información en inglés? Arslan Garcia a http://constance-brian.info/. Priya Burks C964 en la búsqueda para aprender más acerca de \"Várices esofágicas: Instrucciones de cuidado - [ Esophageal Varices: Care Instructions ]. \"  Revisado: 12 olson, 2017  Versión del contenido: 11.4  © 7129-5034 Healthwise, Incorporated. Las instrucciones de cuidado fueron adaptadas bajo licencia por Good Help Connections (which disclaims liability or warranty for this information). Si usted tiene [place] afección médica o sobre estas instrucciones, siempre pregunte a tam profesional de sreekanth. Healthwise, Incorporated niega toda garantía o responsabilidad por tam uso de esta información.

## 2018-01-26 NOTE — PROGRESS NOTES
Subjective:     Chief Complaint   Patient presents with    Medication Refill    Other     pt states she feels sad often and cries at times. She  is a 58 y.o. female who presents for evaluation of Hep C and chronic GI bleeding s/p recent hospitalization. Since discharge, Pt was told by a provider to stop all of her medications except the iron and PPI. Denies having Rx/refills at home. Has been having some abdominal pains for the last month since prior to admission at [address] [place] [address] for GI bleeding. No new episodes of coffee ground emesis nor hematochezia, mikal lower GI bleeding. Pt is also requesting pills for her arthritis. Has tried all manner of OTC creams w/o relief. Pt reports she has not been contacted re: liver institute f/u but then reports she has a appt later in Feb/next month. 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 Keyonna Haile MD at [address] [place] [address] ENDOSCOPY    HX CHOLECYSTECTOMY      HX GI      upper GI    HX GYN           Current Outpatient Prescriptions on File Prior to Visit   Medication Sig Dispense Refill    pantoprazole (PROTONIX) 40 mg tablet Take 1 Tab by mouth Before breakfast and dinner for 30 days. 60 Tab 0    lactulose (CHRONULAC) 20 gram/30 mL soln solution Take 15 mL by mouth two (2) times a day. 1000 mL 1    traMADol (ULTRAM) 50 mg tablet Take 50 mg by mouth every six (6) hours as needed for Pain.  propranolol (INDERAL) 10 mg tablet Take  by mouth three (3) times daily.  lisinopril (PRINIVIL, ZESTRIL) 20 mg tablet Take 1 Tab by mouth daily.  Romanian instructions 90 Tab 1    sucralfate (CARAFATE) 100 mg/mL suspension Take 10 mL by mouth every six (6) hours. 100 mL 0    ferrous sulfate 325 mg (65 mg iron) tablet Take 1 Tab by mouth two (2) times a day. 60 Tab 0    furosemide (LASIX) 40 mg tablet Take 1 Tab by mouth daily. 90 Tab 3    spironolactone (ALDACTONE) 100 mg tablet Take 1 Tab by mouth daily. 90 Tab 3     No current facility-administered medications on file prior to visit. Objective:     Vitals:    01/26/18 1421   BP: 165/70   Pulse: 77   Temp: 97.5 °F (36.4 °C)   TempSrc: Oral   Weight: 206 lb 9.6 oz (93.7 kg)       Physical Examination:  General appearance - alert, well appearing, and in no distress  Eyes -sclera anicteric  Neck - supple, no significant adenopathy, no thyromegaly  Chest - clear to auscultation, no wheezes, rales or rhonchi, symmetric air entry  Heart - normal rate, regular rhythm, normal S1, S2, no murmurs, rubs, clicks or gallops  Neurological - alert, oriented, no focal findings or movement disorder noted  Abdomen-BS present/WNL x 4 quads, non-tender/distended, soft,no organomegaly     No results found for this or any previous visit (from the past 12 hour(s)). POC Hgb 8.9      Assessment/ Plan:   Diagnoses and all orders for this visit:    1. Blood loss anemia  -     AMB POC HEMOGLOBIN (HGB)    2. Edema, unspecified type  -     furosemide (LASIX) 40 mg tablet; Take 1 Tab by mouth daily. 3. Essential hypertension  -     furosemide (LASIX) 40 mg tablet; Take 1 Tab by mouth daily. -     lisinopril (PRINIVIL, ZESTRIL) 20 mg tablet; Take 1 Tab by mouth daily. Azeri instructions  -     spironolactone (ALDACTONE) 100 mg tablet; Take 1 Tab by mouth daily. 4. Bleeding esophageal varices, unspecified esophageal varices type (HCC)  -     AMB POC HEMOGLOBIN (HGB)  -     pantoprazole (PROTONIX) 40 mg tablet; Take 1 Tab by mouth Before breakfast and dinner for 30 days. 5. Portal hypertension (HCC)  -     furosemide (LASIX) 40 mg tablet;  Take 1 Tab by mouth daily. -     lactulose (CHRONULAC) 20 gram/30 mL soln solution; Take 15 mL by mouth two (2) times a day. -     spironolactone (ALDACTONE) 100 mg tablet; Take 1 Tab by mouth daily. 6. Chronic hepatitis C without hepatic coma (HCC)  -     furosemide (LASIX) 40 mg tablet; Take 1 Tab by mouth daily. -     lactulose (CHRONULAC) 20 gram/30 mL soln solution; Take 15 mL by mouth two (2) times a day. -     spironolactone (ALDACTONE) 100 mg tablet; Take 1 Tab by mouth daily. Other orders  -     propranolol (INDERAL) 10 mg tablet; Take 1 Tab by mouth three (3) times daily. POC Hgb stable/slightly improved since hospital discharge at 8.9 today in clinic. Cont PO iron. Counseled Pt strongly on taking her medications listed in her profile for prevention of GI bleeding, hepatitis,etc.     Resend Rx to pharmacy. Suspect resuming Rx may help w/ some of Pts abdominal S&S and improve her BP. Want Pt to return for short interval f/u. Discussed returning to ED for any new/repeat coffee ground emesis or worsening abdominal pain or other concerning S&S. Agreed to discuss OA Rx at [facility], since NSAIDs, APAP and topical creams have not helped and/or are contraindicated given her Hx/co-morbs. Already has f/u w/ Dr Noe Rosales on 2/22. I have discussed the diagnosis with the patient and the intended plan as seen in the above orders. The patient has received an after-visit summary and questions were answered concerning future plans. I have discussed medication side effects and warnings with the patient as well. The patient verbalizes understanding and agreement with the plan.     Follow-up Disposition: Not on File

## 2018-02-05 ENCOUNTER — PATIENT OUTREACH (OUTPATIENT)
Dept: FAMILY MEDICINE CLINIC | Age: 63
End: 2018-02-05

## 2018-02-05 NOTE — PROGRESS NOTES
2/5/18   Maranda Torres called and left an unclear message. NN returned call and left a message with hours of operation.

## 2018-02-05 NOTE — LETTER
2/8/2018 10:34 AM 
 
Ms. Barnett Cover [address] [address] Mi oksana es [place], [facility], por favor de llamr me al [phone]. Mariela Malone RN Ivone A. Alm Heaps RN, CMSRN  Nurse Navigator, Respecting Choices® [facility] Facilitator [address] [facility] [address] [place] [address], [address] [place], [place] [id number] [address] 
[phone] (w)  [phone]. (c)  [phone] (f) [email or web address]

## 2018-02-09 ENCOUNTER — OFFICE VISIT (OUTPATIENT)
Dept: FAMILY MEDICINE CLINIC | Age: 63
End: 2018-02-09

## 2018-02-09 VITALS
BODY MASS INDEX: 40.82 KG/M2 | TEMPERATURE: 98.4 F | DIASTOLIC BLOOD PRESSURE: 59 MMHG | SYSTOLIC BLOOD PRESSURE: 180 MMHG | WEIGHT: 209 LBS | HEART RATE: 70 BPM

## 2018-02-09 DIAGNOSIS — B18.2 CHRONIC HEPATITIS C WITHOUT HEPATIC COMA (HCC): Primary | ICD-10-CM

## 2018-02-09 DIAGNOSIS — M25.50 ARTHRALGIA, UNSPECIFIED JOINT: ICD-10-CM

## 2018-02-09 LAB — HGB BLD-MCNC: 9 G/DL

## 2018-02-09 RX ORDER — DICLOFENAC SODIUM 10 MG/G
2 GEL TOPICAL 4 TIMES DAILY
Qty: 100 G | Refills: 3 | COMMUNITY
Start: 2018-02-09 | End: 2019-01-31 | Stop reason: SDUPTHER

## 2018-02-09 RX ORDER — VELPATASVIR AND SOFOSBUVIR 100; 400 MG/1; MG/1
TABLET, FILM COATED ORAL
Qty: 90 TAB | Refills: 0 | COMMUNITY
Start: 2018-02-09 | End: 2018-05-09 | Stop reason: ALTCHOICE

## 2018-02-09 NOTE — Clinical Note
Suzan, I saw your attempted calls to her this week. Would you mind f/u with her in 2 weeks re: her BP and response to dicofenac gel? She was going to check her BPs at home and keep a log. If still high, let me know and I would prob recommend she double lisinopril to get to 40mg/day first.   Thank you!  Korina Presley

## 2018-02-09 NOTE — PROGRESS NOTES
Coordination of Care  1. Have you been to the ER, urgent care clinic since your last visit? Hospitalized since your last visit? No    2. Have you seen or consulted any other health care providers outside of the [address] [place] [address] since your last visit? Include any pap smears or colon screening. No    Medications  Does the patient need refills?  YES    Learning Assessment Complete? yes  Results for orders placed or performed in visit on 01/26/18   AMB POC HEMOGLOBIN (HGB)   Result Value Ref Range    Hemoglobin (POC) 9.0

## 2018-02-09 NOTE — PATIENT INSTRUCTIONS
Dolor de pie: Instrucciones de cuidado - [ Foot Pain: Care Instructions ]  Instrucciones de cuidado  Las lesiones de pie que causan dolor e Birder Bozena son bastante comunes. Kimberley todos los deportes o trabajos de reparación en el hogar pueden causar un paso en falso que termine con un dolor en el pie. El desgaste normal, sobre todo al [employer], también puede causar [employer]. La mayoría de las lesiones menores del pie sanarán por sí solas, y el tratamiento en el hogar suele ser todo lo que necesita. Roman si tiene lam lesión grave, quizás necesite exámenes y [place]. La atención de seguimiento es lam parte clave de hinojosa tratamiento y seguridad. Asegúrese de hacer y acudir a todas las citas, y llame a hinojosa médico si está teniendo problemas. También es lam buena idea saber los resultados de los exámenes y mantener lam lista de los medicamentos que bony. ¿Cómo puede cuidarse en el hogar? · [employer] analgésicos (medicamentos para el dolor) exactamente ofelia le fueron indicados. ¨ Si el médico le recetó un analgésico, tómelo según las indicaciones. ¨ Si no está tomando un analgésico recetado, pregúntele a hinojosa médico si puede monika un medicamento de [place]. · Descanse y proteja hinojosa pie. Deje de hacer cualquier actividad que pudiera causarle dolor. · Colóquese hielo o lam compresa fría en el pie wesly 10 a 20 minutos cada vez. Póngase un paño oquendo entre el hielo y la piel. · Eleve el pie adolorido sobre lam almohada cuando se aplique hielo o en cualquier momento que se siente o acueste wesly los 3 días siguientes. Trate de mantenerlo por encima del nivel del corazón. [place] ayudará a reducir la hinchazón. · Hinojosa médico podría recomendar que se envuelva el pie con un vendaje elástico. Mantenga el pie envuelto tanto tiempo ofelia hinojosa médico lo recomiende. · Si hinojosa médico le recomendó usar muletas, úselas según las indicaciones. · Use zapatos amplios.   · Tan pronto ofelia el dolor y la hinchazón terminen, comience a hacer ejercicios suaves con el pie. Hinojosa médico le puede decir qué ejercicios ayudarán. ¿Cuándo debe pedir ayuda? Llame al 911 en cualquier momento que considere que necesita atención de emergencia. Por ejemplo, llame si:  ? · Hinojosa pie se pone pálido, blancuzco, azulado o frío. ?Llame a hinojosa médico ahora mismo o busque atención médica inmediata si:  ? · No puede  o pararse en el pie. ? · Hinojosa pie se ve torcido o fuera de hinojosa posición normal.   ? · Hinojosa pie no es estable cuando pisa. ? · Tiene señales de infección, tales ofelia:  ¨ Aumento del dolor, la hinchazón, el enrojecimiento o la temperatura. ¨ Vetas rojizas que comienzan en la amada adolorida. ¨ Pus que supura de lam amada del pie. Layman [place]. ? · Siente hinojosa pie entumecido o con hormigueo. ?Preste especial atención a los cambios en hinojosa sreekanth y asegúrese de comunicarse con hinojosa médico si:  ? · No mejora ofelia se esperaba. ? · Tiene moretones por lam lesión que stewart más de 2 semanas. ¿Dónde puede encontrar más información en inglés? Yuridia Murrell a http://constance-brian.info/. Harrie Phoenix R695 en la búsqueda para aprender más acerca de \"Dolor de pie: Instrucciones de cuidado - [ Foot Pain: Care Instructions ]. \"  Revisado: 21 marzo, 2017  Versión del contenido: 11.4  © 7274-6249 Healthwise, Incorporated. Las instrucciones de cuidado fueron adaptadas bajo licencia por [employer] (which disclaims liability or warranty for this information). Si usted tiene [place] afección médica o sobre estas instrucciones, siempre pregunte a hinojosa profesional de sreekanth. [facility] niega toda garantía o responsabilidad por hinojosa uso de esta información.

## 2018-02-09 NOTE — PROGRESS NOTES
Subjective:     Chief Complaint   Patient presents with    Follow-up        She  is a 58 y.o. female who presents for evaluation of HTN, Hep C and chronic pains. Since LOV, Pt reports overall good health. C/o of intermittent, chronic foot pains for multiple months. No acute trauma nor injury. Only attempted relief with OTC muscle patches. No new dizziness, vomiting, coffee ground emesis, h/a, CP nor palpitations. Pt confirms she has been taking her Rx as directed since LOV, BP/diurectics/iron this AM.     Does not check BP at home. Recently started PO Hep C Tx via Dr. Jennifer Salazar office x 3 days. 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 David So MD at [facility] ENDOSCOPY    HX CHOLECYSTECTOMY      HX GI      upper GI    HX GYN           Current Outpatient Prescriptions on File Prior to Visit   Medication Sig Dispense Refill    pantoprazole (PROTONIX) 40 mg tablet Take 1 Tab by mouth Before breakfast and dinner for 30 days. 60 Tab 0    furosemide (LASIX) 40 mg tablet Take 1 Tab by mouth daily. 90 Tab 3    lactulose (CHRONULAC) 20 gram/30 mL soln solution Take 15 mL by mouth two (2) times a day. 1000 mL 1    lisinopril (PRINIVIL, ZESTRIL) 20 mg tablet Take 1 Tab by mouth daily. Kiswahili instructions 90 Tab 1    propranolol (INDERAL) 10 mg tablet Take 1 Tab by mouth three (3) times daily. 90 Tab 3    spironolactone (ALDACTONE) 100 mg tablet Take 1 Tab by mouth daily. 90 Tab 3    ferrous sulfate 325 mg (65 mg iron) tablet Take 1 Tab by mouth two (2) times a day.  60 Tab 0     No current facility-administered medications on file prior to visit. Objective:     Vitals:    02/09/18 1046   BP: 180/59   Pulse: 70   Temp: 98.4 °F (36.9 °C)   TempSrc: Oral   Weight: 209 lb (94.8 kg)       Physical Examination:  General appearance - alert, well appearing, and in no distress  Eyes -sclera anicteric  Neck - supple, no significant adenopathy, no thyromegaly  Chest - clear to auscultation, no wheezes, rales or rhonchi, symmetric air entry  Heart - normal rate, regular rhythm, normal S1, S2, no murmurs, rubs, clicks or gallops  Neurological - alert, oriented, no focal findings or movement disorder noted  Abdomen-BS present/WNL x 4 quads, non-tender/distended, soft,no organomegaly    Manual BP: 180/100, R arm, asymptomatic     Assessment/ Plan:   Diagnoses and all orders for this visit:    1. Chronic hepatitis C without hepatic coma ([facility]    2. Arthralgia, unspecified joint        Start PRN voltaren gel for aches/pains. Given Pt's allergies, Hx of esophageal varices and liver DO, most analgesics are contraindicated. Discussed option of Tramadol, which might be well tolerated despite Hx of itching/N/V with prior morphine use. Advised to check BPs at home and call clinic in 2 weeks for f/u. May increase Lisinopril dose to BID/40mg day if BP still high per home reports. Re-eval in 4-6 weeks. POG Hgb improved since LOV. I have discussed the diagnosis with the patient and the intended plan as seen in the above orders. The patient has received an after-visit summary and questions were answered concerning future plans. I have discussed medication side effects and warnings with the patient as well. The patient verbalizes understanding and agreement with the plan.     Follow-up Disposition: Not on File

## 2018-02-09 NOTE — PROGRESS NOTES
Statements below were documented by Marquis Crooks discharged home with AVS and Medication teaching . No further questions. Good Rx coupon given for Dicflonec gel 1 % for [facility] for $ 22.02 . Scheduled follow-up appointment for in March .  Paul Singer RN

## 2018-02-09 NOTE — MR AVS SNAPSHOT
[address] [place] [address] 
[phone] Patient: Zoey Bell MRN: [id number] URA:8[birth year]11 Visit Information Lidya Lugo Personal Médico Departamento Teléfono del Dep. Número de visita  
 2/9/2018 10:30 AM JAMIL Ryder-BON Colon [place] Belita Edouard [phone] Follow-up Instructions Return in about 6 weeks (around 3/23/2018). Your Appointments 2/22/2018 11:00 AM  
PROCEDURE with Marco Tyson MD  
[facility] [address] [place] [address]) Appt Note: EGD; EGD  
 15[facility] [phone] Formerly [facility] [address]  
[phone]  
  
   
 [address] [address]  
  
    
 3/8/2018  9:00 AM  
Follow Up with NOA Anders [address] ([address] [place] [address]) Appt Note: New patient c/o sadness. 250 [facility] 210 [facility] 57  
[phone]  
  
   
 [address] [place] [address] [address] [id number]  
  
    
 3/23/2018 12:30 PM  
Follow Up with JAMIL Ryder-[address] ([address] [place] [address]) Appt Note: F/u per So by DLL  
 [address] [place] [address] 210 [facility] 7 [id number]  
[phone]  
  
   
 [address] [place] [id number] Upcoming Health Maintenance Date Due Pneumococcal 19-64 Medium Risk (1 of 1 - PPSV23) 3/5/1974 DTaP/Tdap/Td series (1 - Tdap) 3/5/1976 PAP AKA CERVICAL CYTOLOGY 3/5/1976 BREAST CANCER SCRN MAMMOGRAM 3/5/2005 ZOSTER VACCINE AGE 60> 1/5/2015 FOBT Q 1 YEAR AGE 50-75 8/17/2018 Alergias  Review Complete El: 2/9/2018 Por: JAMIL Ryder Al del:  2/9/2018 Intensidad Anotado Tipo de reacción [place] [facility] Financial Morphine  03/11/2011    Nausea and Vomiting, Other (comments) Histamine release? Red streak on arm after IV injection Vacunas actuales Revisadas el:  8/20/2017 Umu Parker Influenza Vaccine (Quad) PF 11/20/2017, 10/20/2016 No revisadas esta visita You Were Diagnosed With   
  
 Shaniqua Solis Chronic hepatitis C without hepatic coma (HCC)    -  Primary ICD-10-CM: B18.2 ICD-9-CM: 070.54 Arthralgia, unspecified joint     ICD-10-CM: M25.50 ICD-9-CM: 719.40 Partes vitales PS Pulso Temperatura Peso (percentil de crecimiento) BMI (IMC) Estado obstétrico  
 180/59 (BP 1 Location: Right arm, BP Patient Position: Sitting) 70 98.4 °F (36.9 °C) (Oral) 209 lb (94.8 kg) 40.82 kg/m2 Postmenopausal  
 Estatus de tabaquísmo Former Smoker Historial de signos vitales BMI and BSA Data Body Mass Index Body Surface Area  
 40.82 kg/m 2 2 m 2 [facility] Name Phone [place] [address] [place] [address],[address] [place] [address], [address] [place] [address] Hinojosa lista de medicamentos actualizada Lista actualizada el: 2/9/18 12:39 PM.  Anjelica Shearing use hinojosa lista de medicamentos más reciente. diclofenac 1 % Gel También conocido ofelia:  VOLTAREN Apply 2 g to affected area four (4) times daily. EPCLUSA 400-100 mg Tab Medicamento genérico:  sofosbuvir-velpatasvir Take 1 tab daily x 3 months. PAP Rx from Dr. Brayan Wyatt office. ferrous sulfate 325 mg (65 mg iron) tablet Take 1 Tab by mouth two (2) times a day. furosemide 40 mg tablet También conocido ofelia:  LASIX Take 1 Tab by mouth daily. lactulose 20 gram/30 mL Soln solution También conocido ofelia:  Orlene Range Take 15 mL by mouth two (2) times a day. lisinopril 20 mg tablet También conocido ofelia:  Anastacio Kansky Take 1 Tab by mouth daily. Sinhala instructions  
  
 pantoprazole 40 mg tablet También conocido ofelia:  PROTONIX Take 1 Tab by mouth Before breakfast and dinner for 30 days. propranolol 10 mg tablet También conocido ofelia:  INDERAL  
 Take 1 Tab by mouth three (3) times daily. spironolactone 100 mg tablet También conocido ofelia:  ALDACTONE Take 1 Tab by mouth daily. Instrucciones de seguimiento Return in about 6 weeks (around 3/23/2018). Instrucciones para el Paciente Dolor de pie: Instrucciones de cuidado - [ Foot Pain: Care Instructions ] Instrucciones de cuidado Las lesiones de pie que causan dolor e hinchazón son bastante comunes. [place] todos los deportes o trabajos de reparación en el hogar pueden causar un paso en falso que termine con un dolor en el pie. El desgaste normal, sobre todo al [employer], también puede causar [employer]. La mayoría de las lesiones menores del pie sanarán por sí solas, y el tratamiento en el hogar suele ser todo lo que necesita. Roman si tiene lam lesión grave, quizás necesite exámenes y [place]. La atención de seguimiento es lam parte clave de tam tratamiento y seguridad. Asegúrese de hacer y acudir a todas las citas, y llame a tam médico si está teniendo problemas. También es lam buena idea saber los resultados de los exámenes y mantener lam lista de los medicamentos que bony. Cómo puede cuidarse en el hogar? · [employer] analgésicos (medicamentos para el dolor) exactamente ofelia le fueron indicados. ¨ Si el médico le recetó un analgésico, tómelo según las indicaciones. ¨ Si no está tomando un analgésico recetado, pregúntele a tam médico si puede monika un medicamento de [employer]. · Descanse y proteja tam pie. Deje de hacer cualquier actividad que pudiera causarle dolor. · Colóquese hielo o lam compresa fría en el pie wesly 10 a 20 minutos cada vez. Póngase un paño oquendo entre el hielo y la piel. · Eleve el pie adolorido sobre lam almohada cuando se aplique hielo o en cualquier momento que se siente o acueste wesly los 3 días siguientes. Trate de mantenerlo por encima del nivel del corazón. [place] ayudará a reducir la hinchazón. · Tam médico podría recomendar que se envuelva el pie con un vendaje elástico. Mantenga el pie envuelto tanto tiempo ofelia tam médico lo recomiende. · Si tam médico le recomendó usar muletas, úselas según las indicaciones. · Use zapatos amplios. · Tan pronto ofelia el dolor y la hinchazón terminen, comience a hacer ejercicios suaves con el pie. Tam médico le puede decir qué ejercicios ayudarán. Cuándo debe pedir ayuda? Llame al 911 en cualquier momento que considere que necesita atención de emergencia. Por ejemplo, llame si: 
? · Tam pie se pone pálido, blancuzco, azulado o frío. ?Llame a tam médico ahora mismo o busque atención médica inmediata si: 
? · No puede  o pararse en el pie. ? · Tam pie se ve torcido o fuera de tam posición normal.  
? · Tam pie no es estable cuando pisa. ? · [address], tales ofelia: ¨ Aumento del dolor, la hinchazón, el enrojecimiento o la temperatura. ¨ Vetas rojizas que comienzan en la amada adolorida. ¨ Pus que supura de lam amada del pie. Pollabdulazizn Chente. ? · Siente tam pie entumecido o con hormigueo. ?Preste especial atención a los cambios en tam sreekanth y asegúrese de comunicarse con tam médico si: 
? · No mejora ofelia se esperaba. ? · Tiene moretones por lam lesión que stewart más de 2 semanas. Dónde puede encontrar más información en inglés? Taylor Lower a http://constance-brian.info/. Julianna [place] T788 en la búsqueda para aprender más acerca de \"Dolor de pie: Instrucciones de cuidado - [ Foot Pain: Care Instructions ]. \" 
Revisado: 21 marzo, 2017 Versión del contenido: 11.4 © 5868-6588 Healthwise, [employer]. Las instrucciones de cuidado fueron adaptadas bajo licencia por [employer] (which disclaims liability or warranty for this information). Si usted tiene [place] afección médica o sobre estas instrucciones, siempre pregunte a tam profesional de sreekanth.  [employer], [employer] niega toda garantía o responsabilidad por tam uso de esta información. Introducing [facility]! [facility] introduce portal paciente MyChart . Ahora se puede acceder a partes de tam expediente médico, enviar por correo electrónico la oficina de tam médico y solicitar renovaciones de medicamentos en línea. En tam navegador de Internet , Mickey Rivera a [email or web address] Harjinder clic en el usuario por Marguerita Halo? Sarah Lexie clic aquí en la sesión [place]. Verá la página de registro [place]. Ingrese tam código de [employer] magalys y ofelia aparece a continuación. Usted no tendrá que [employer] código después de sukhdeep completado el proceso de registro . Si usted no se inscribe antes de la fecha de caducidad , debe solicitar un nuevo código. · MyChart Código de acceso : [id number] Expires: 2/18/2018  3:05 PM 
 
Ingresa los últimos cuatro dígitos de tam Número de Seguro Social ( xxxx ) y fecha de nacimiento ( dd / mm / aaaa ) ofelia se indica y harjinder clic en Enviar. Usted será llevado a la siguiente página de registro . Crear un ID MyChart . Esta será tam ID de inicio de sesión de MyChart y no puede ser [place] , por lo que pensar en lam que es Lelon Hanly y fácil de recordar . Crear lam contraseña MyChart . Usted puede cambiar tam contraseña en cualquier momento . Ingrese tam Password Reset de preguntas y [place] . [place] se puede utilizar en un momento posterior si usted olvida tam contraseña. Introduzca tam dirección de correo electrónico . Tala Michel recibirá lam notificación por correo electrónico cuando la nueva información está disponible en MyChart . [place] clic en Registrarse. [place]s víctor y descargar porciones de tam expediente médico. 
Harjinder clic en el enlace de descarga del menú Resumen para descargar lam copia portátil de tam información médica . Si tiene [employer] , por favor visite la sección de preguntas frecuentes del sitio web MyChart .  Recuerde, MyChart NO es que se utilizará para las [employer]. Para emergencias médicas , llame al 911 . Ahora disponible en tam iPhone y Android ! Por favor proporcione tameka resumen de la documentación de cuidado a tam próximo proveedor. Your primary care clinician is listed as Linda Holbrook. If you have any questions after today's visit, please call [phone].

## 2018-02-21 ENCOUNTER — TELEPHONE (OUTPATIENT)
Dept: FAMILY MEDICINE CLINIC | Age: 63
End: 2018-02-21

## 2018-02-21 DIAGNOSIS — M79.673 PAIN OF FOOT, UNSPECIFIED LATERALITY: Primary | ICD-10-CM

## 2018-02-21 RX ORDER — TRAMADOL HYDROCHLORIDE 50 MG/1
50 TABLET ORAL
Qty: 15 TAB | Refills: 0 | Status: SHIPPED | OUTPATIENT
Start: 2018-02-21 | End: 2018-03-09 | Stop reason: SINTOL

## 2018-02-21 NOTE — TELEPHONE ENCOUNTER
Patient would like to talk with a nurse. She said she is in pain and can't walk and needs something for the pain.     Joe Montgomery

## 2018-02-21 NOTE — TELEPHONE ENCOUNTER
[facility]. Rx called in for pt- Tramadol per provider. See rx order. [facility]  Leigh Thomas RN

## 2018-02-21 NOTE — TELEPHONE ENCOUNTER
Recommend PRN Tramadol as discussed at LOV Ordered TID PRN but no more than 15 tabs used until NOV. Reviewed allergy to morphine sulfate, appears to be SE not allergy. Recommend RN  Pt to take w/ food and that potential for sedation and dizziness exist and to use caution the first several doses. Re-eval at [address].

## 2018-02-21 NOTE — TELEPHONE ENCOUNTER
Int # [id number]. Tc made to the pt to let her know her rx for the pain medication Tramadol was called in to the [email or web address]. The medication dose ! Tablet no more than 3 tablets in 24 hrs and the provider is dispensing #15 tablets no refills. This was explained to the pt. Precautions and recommendations per provider for the pain medications was reviewed with the pt. The pt was told the provider will need to re-eval her at next office visit. Pt told to wait 1-2 hrs before picking up the medication so the Pharm could get it ready.   Danika Rawls RN

## 2018-02-21 NOTE — TELEPHONE ENCOUNTER
Int #[id number]. Pt called by nurse. The pt is stating the cream she was given by the provider for her pain is not really helping. She is asking if she can try the pills for pain the provider had mentioned giving her when she was in the clinic. The pt stated she did not want to take the pills first. She wanted to see if just the cream would work for her pain, but it did not. The pain has actually increased. The pt stated she is having pain in the same areas she was having. The pain is not new pain. The Pt was told a message would be sent to the provider.  Twila Chacon RN

## 2018-02-26 ENCOUNTER — TELEPHONE (OUTPATIENT)
Dept: FAMILY MEDICINE CLINIC | Age: 63
End: 2018-02-26

## 2018-02-26 NOTE — TELEPHONE ENCOUNTER
Attempted to reach patient to discuss her concern over taking tramadol. Left pt a voice mail to call [facility] and discuss this with a nurse. Also spoke to Tanya at [facility] in [place] who stated pt had said she had breathing problems last time she took Tramadol. Tanya did not give pt the prescription although pt wanted to buy it so she can take it to show her provider.

## 2018-02-26 NOTE — TELEPHONE ENCOUNTER
Tanya at [facility] said patient told them she is allergic to morphine and also said after taking Tramadol, she cannot read. Tanya asked if we want to prescribe a different medication.     Phyllis Montgomery

## 2018-02-28 NOTE — TELEPHONE ENCOUNTER
Patient return missed phone call from [facility].          Message routed to Le Murillo NP and [address]

## 2018-03-05 ENCOUNTER — TELEPHONE (OUTPATIENT)
Dept: FAMILY MEDICINE CLINIC | Age: 63
End: 2018-03-05

## 2018-03-05 NOTE — TELEPHONE ENCOUNTER
I contacted the patient and rescheduled her appointment with Renard Stauffer for Thursday, April 12, 2018 at 08:30 AM. The patient authorized me to send the appointment information via mail to the address in file. Patient verbalized understanding and had no further questions. Phone call routed to Andreas Heath Blenda Adolf.  Rosa Jiang MD.    Millicent Pittman

## 2018-03-05 NOTE — TELEPHONE ENCOUNTER
Patient said she was returning our call. She didn't know who called her or when. She also wanted to reschedule her upcoming appointment on Thursday with Luana Bansal. I canceled her appointment but could not reschedule it.     Arlin Montgomery

## 2018-03-07 NOTE — TELEPHONE ENCOUNTER
Call returned to pt. She said the pharmacist would not give the pain med to her because of past reactions. She is c/o of \"bones hurting\" and asking why this is happening. Will route message back to provider Yady Gutierrez NP for review. Also stated she is going to the hospital to see a liver doctor on Friday 3/9/18 but this is not seen in the computer system.

## 2018-03-07 NOTE — TELEPHONE ENCOUNTER
Call returned. Message reviewed per provider Vazquez Berumen. Appt is seen in [facility] w/  [facility] on Friday 3/9/18 at 8:30a.

## 2018-03-09 ENCOUNTER — OFFICE VISIT (OUTPATIENT)
Dept: HEMATOLOGY | Age: 63
End: 2018-03-09

## 2018-03-09 VITALS
SYSTOLIC BLOOD PRESSURE: 182 MMHG | HEART RATE: 88 BPM | TEMPERATURE: 97.6 F | WEIGHT: 211 LBS | DIASTOLIC BLOOD PRESSURE: 67 MMHG | OXYGEN SATURATION: 96 % | BODY MASS INDEX: 41.21 KG/M2

## 2018-03-09 DIAGNOSIS — K74.60 CIRRHOSIS OF LIVER WITHOUT ASCITES, UNSPECIFIED HEPATIC CIRRHOSIS TYPE (HCC): Primary | ICD-10-CM

## 2018-03-09 DIAGNOSIS — B18.2 CHRONIC HEPATITIS C WITHOUT HEPATIC COMA (HCC): ICD-10-CM

## 2018-03-09 PROBLEM — E66.01 OBESITY, MORBID (HCC): Status: ACTIVE | Noted: 2018-03-09

## 2018-03-09 RX ORDER — LACTULOSE 10 G/15ML
SOLUTION ORAL; RECTAL
Refills: 1 | COMMUNITY
Start: 2017-12-14 | End: 2018-03-09 | Stop reason: DRUGHIGH
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
## 2018-03-09 NOTE — PROGRESS NOTES
1. Have you been to the ER, urgent care clinic since your last visit? Hospitalized since your last visit? No    2. Have you seen or consulted any other health care providers outside of the [address] [place] [address] since your last visit? Include any pap smears or colon screening.  No   Chief Complaint   Patient presents with    Follow-up     FOLLOW UP, and to discuss dizziness      Visit Vitals    /67 (BP 1 Location: Left arm, BP Patient Position: Sitting)    Pulse 88    Temp 97.6 °F (36.4 °C) (Tympanic)    Wt 211 lb (95.7 kg)    SpO2 96%    BMI 41.21 kg/m2     PHQ over the last two weeks 3/9/2018   Little interest or pleasure in doing things Not at all   Feeling down, depressed or hopeless Not at all   Total Score PHQ 2 0     Learning Assessment 3/9/2018   PRIMARY LEARNER Patient   HIGHEST LEVEL OF EDUCATION - PRIMARY LEARNER  -   BARRIERS PRIMARY LEARNER NONE   CO-LEARNER CAREGIVER No   CO-LEARNER NAME -   [address] [place] [address] -   ANSWERED BY patient   RELATIONSHIP SELF

## 2018-03-09 NOTE — MR AVS SNAPSHOT
[address] [facility] [phone] [address] 
[phone] Patient: Ana Abbasi MRN: [id number] SHR:0[birth year] Visit Information Carlos Ridley y Hanseunice Personal Médico Departamento Teléfono del Dep. Número de visita  
 3/9/2018  8:30 AM Kate Beach, [address] [place] [address] [phone] Follow-up Instructions Return in about 2 months (around 5/9/2018) for Juan Mathis. Your Appointments 3/23/2018 12:30 PM  
Follow Up with Becky Rudolph NP  
[facility]- [address] ([facility]) Appt Note: F/u per So by DLL  
 [address] [facility] [address] [place] [address]  
[phone]  
  
   
 [address] [place] [address] [address] [place] [phone]  
  
    
 4/12/2018  8:30 AM  
New Patient with NOA [place] [address] ([facility]) Appt Note: New patient c/o sadness. ; referred by PCP; has another appointment; New patient c/o sadness. Referred by PCP  
 [address] [facility] [address] [place] [address]  
[phone]  
  
   
 [address] [place], [place] [address] [facility] [address]  
  
    
 5/9/2018  8:30 AM  
Follow Up with VIDHYA Gutierrez [address] ([facility]) Appt Note: Follow up [address] [facility] [phone] [place] [id number]  
59 [facility] [address] Upcoming Health Maintenance Date Due Pneumococcal 19-64 Medium Risk (1 of 1 - PPSV23) 3/5/1974 DTaP/Tdap/Td series (1 - Tdap) 3/5/1976 PAP AKA CERVICAL CYTOLOGY 3/5/1976 BREAST CANCER SCRN MAMMOGRAM 3/5/2005 ZOSTER VACCINE AGE 60> 1/5/2015 FOBT Q 1 YEAR AGE 50-75 8/17/2018 Alergias  Review Complete El: 3/9/2018 Por: Debby [place] A partir del:  3/9/2018 Intensidad Anotado Tipo de reacción [place] [facility] Financial Morphine  03/11/2011    Nausea and Vomiting, Other (comments) Histamine release? Red streak on arm after IV injection Vacunas actuales Revisadas el:  8/20/2017 Yassine Lucia Influenza Vaccine (Quad) PF 11/20/2017, 10/20/2016 No revisadas esta visita You Were Diagnosed With   
  
 Brigida Keane Cirrhosis of liver without ascites, unspecified hepatic cirrhosis type ([facility]    -  Primary ICD-10-CM: K74.60 ICD-9-CM: 571.5 Chronic hepatitis C without hepatic coma (HCC)     ICD-10-CM: B18.2 ICD-9-CM: 070.54 Partes vitales PS Pulso Temperatura Peso (percentil de crecimiento) SpO2 BMI (IMC)  
 182/67 (BP 1 Location: Left arm, BP Patient Position: Sitting) 88 97.6 °F (36.4 °C) (Tympanic) 211 lb (95.7 kg) 96% 41.21 kg/m2 Estado obstétrico Estatus de tabaquísmo Postmenopausal Former Smoker BMI and BSA Data Body Mass Index Body Surface Area  
 41.21 kg/m 2 2.01 m 2 [facility] Name Phone [facility] [address] [facility],First [facility], [address] [facility] AT [place] [phone] Hinojosa lista de medicamentos actualizada Dwanye Joseph actualizada 3/9/18  8:58 AM.  Jacob Salas use hinojosa lista de medicamentos más reciente. diclofenac 1 % Gel También conocido ofelia:  VOLTAREN Apply 2 g to affected area four (4) times daily. EPCLUSA 400-100 mg Tab Medicamento genérico:  sofosbuvir-velpatasvir Take 1 tab daily x 3 months. PAP Rx from Dr. Elby Angelucci office. ferrous sulfate 325 mg (65 mg iron) tablet Take 1 Tab by mouth two (2) times a day. furosemide 40 mg tablet También conocido ofelia:  LASIX Take 1 Tab by mouth daily. lactulose 20 gram/30 mL Soln solution También conocido ofelia:  [address] Take 15 mL by mouth two (2) times a day. lisinopril 20 mg tablet También conocido ofelia:  Aurelio Montelongo Take 1 Tab by mouth daily. Turkish instructions  
  
 propranolol 10 mg tablet También conocido ofelia:  INDERAL Take 1 Tab by mouth three (3) times daily. spironolactone 100 mg tablet También conocido ofelia:  ALDACTONE Take 1 Tab by mouth daily. Hicimos lo siguiente AFP WITH AFP-L3% [[id number] Custom] CBC W/O DIFF [67348 CPT(R)] FERRITIN [29654 CPT(R)] HCV RNA BY JOHNATHAN QL,RFLX TO QT [98986 CPT(R)] HEPATIC FUNCTION PANEL (6) [[id number] Custom] IRON PROFILE [id number] CPT(R)] METABOLIC PANEL, BASIC [66541 CPT(R)] PROTHROMBIN TIME + INR [06136 CPT(R)] Instrucciones de seguimiento Return in about 2 months (around 5/9/2018) for [facility]. Por hacer 05/09/2018 Imaging:  US ABD COMP Introducing [facility]! [facility] introduce portal paciente MyChart . Ahora se puede acceder a partes de tam expediente médico, enviar por correo electrónico la oficina de tam médico y solicitar renovaciones de medicamentos en línea. En tam navegador de Internet , Antonio Saldivaront a [email or web address] Harjinder clic en el usuario por Raciel Orellana? Morgan Fuse clic aquí en la sesión [address]. Verá la página de registro [place]. Ingrese tam código de [employer] magalys y ofelia aparece a continuación. Usted no tendrá que [employer] código después de sukhdeep completado el proceso de registro . Si usted no se inscribe antes de la fecha de caducidad , debe solicitar un nuevo código. · MyChart Código de acceso : [email or web address] Expires: 6/7/2018  8:58 AM 
 
Ingresa los últimos cuatro dígitos de tam Número de Seguro Social ( xxxx ) y fecha de nacimiento ( dd / mm / aaaa ) ofelia se indica y harjinder clic en Enviar. Usted será llevado a la siguiente página de registro . Crear un ID MyChart . Esta será tam ID de inicio de sesión de MyChart y no puede ser [place] , por lo que pensar en lam que es Lashae Joanne y fácil de recordar . Crear lam contraseña MyChart . Usted puede cambiar tam contraseña en cualquier momento . Ingrese tam Password Reset de preguntas y [place] . [place] se puede utilizar en un momento posterior si usted olvida tam contraseña. Introduzca tam dirección de correo electrónico . Aga Red recibirá lam notificación por correo electrónico cuando la nueva información está disponible en MyChart . Jairo Brilliant clic en Registrarse. Meryle Brown víctor y descargar porciones de tam expediente médico. 
Sheeba clic en el enlace de descarga del menú Resumen para descargar lam copia portátil de tam información médica . Si tiene [employer] , por favor visite la sección de preguntas frecuentes del sitio web [employer] . Recuerde, [employer] NO es que se utilizará para las necesidades urgentes. Para emergencias médicas , llame al 911 . Ahora disponible en tam iPhone y Android ! Por favor proporcione tameka resumen de la documentación de cuidado a tam próximo proveedor. Your primary care clinician is listed as Delaney Licona. If you have any questions after today's visit, please call [phone].

## 2018-03-10 LAB
ALBUMIN SERPL-MCNC: 3.6 G/DL (ref 3.6–4.8)
ALP SERPL-CCNC: 126 IU/L (ref 39–117)
ALT SERPL-CCNC: 29 IU/L (ref 0–32)
AST SERPL-CCNC: 31 IU/L (ref 0–40)
BILIRUB DIRECT SERPL-MCNC: 0.31 MG/DL (ref 0–0.4)
BILIRUB SERPL-MCNC: 0.9 MG/DL (ref 0–1.2)
BUN SERPL-MCNC: 16 MG/DL (ref 8–27)
BUN/CREAT SERPL: 32 (ref 12–28)
CALCIUM SERPL-MCNC: 8.4 MG/DL (ref 8.7–10.3)
CHLORIDE SERPL-SCNC: 108 MMOL/L (ref 96–106)
CO2 SERPL-SCNC: 22 MMOL/L (ref 18–29)
CREAT SERPL-MCNC: 0.5 MG/DL (ref 0.57–1)
ERYTHROCYTE [DISTWIDTH] IN BLOOD BY AUTOMATED COUNT: 17.8 % (ref 12.3–15.4)
GFR SERPLBLD CREATININE-BSD FMLA CKD-EPI: 103 ML/MIN/1.73
GFR SERPLBLD CREATININE-BSD FMLA CKD-EPI: 119 ML/MIN/1.73
GLUCOSE SERPL-MCNC: 159 MG/DL (ref 65–99)
HCT VFR BLD AUTO: 32 % (ref 34–46.6)
HGB BLD-MCNC: 9.9 G/DL (ref 11.1–15.9)
INR PPP: 1.1 (ref 0.8–1.2)
IRON SATN MFR SERPL: 13 % (ref 15–55)
IRON SERPL-MCNC: 59 UG/DL (ref 27–139)
MCH RBC QN AUTO: 28.4 PG (ref 26.6–33)
MCHC RBC AUTO-ENTMCNC: 30.9 G/DL (ref 31.5–35.7)
MCV RBC AUTO: 92 FL (ref 79–97)
MORPHOLOGY BLD-IMP: ABNORMAL
PLATELET # BLD AUTO: 54 X10E3/UL (ref 150–379)
POTASSIUM SERPL-SCNC: 3.6 MMOL/L (ref 3.5–5.2)
PROTHROMBIN TIME: 11.4 SEC (ref 9.1–12)
RBC # BLD AUTO: 3.49 X10E6/UL (ref 3.77–5.28)
SODIUM SERPL-SCNC: 144 MMOL/L (ref 134–144)
TIBC SERPL-MCNC: 451 UG/DL (ref 250–450)
UIBC SERPL-MCNC: 392 UG/DL (ref 118–369)
WBC # BLD AUTO: 2.1 X10E3/UL (ref 3.4–10.8)

## 2018-03-11 LAB — FERRITIN SERPL-MCNC: 19 NG/ML (ref 15–150)

## 2018-03-12 LAB
AFP L3 MFR SERPL: 7.4 % (ref 0–9.9)
AFP SERPL-MCNC: 4.6 NG/ML (ref 0–8)

## 2018-03-16 LAB
HCV RNA SERPL NAA+PROBE-ACNC: NORMAL IU/ML
HCV RNA SERPL NAA+PROBE-LOG IU: NORMAL LOG10 IU/ML
HCV RNA SERPL QL NAA+PROBE: POSITIVE
TEST INFORMATION: NORMAL

## 2018-03-19 NOTE — PROGRESS NOTES
Pt notified of undetected viral count, continue with medications as prescribed and follow-up in 5//2018 with same day US of the liver.

## 2018-03-27 ENCOUNTER — TELEPHONE (OUTPATIENT)
Dept: FAMILY MEDICINE CLINIC | Age: 63
End: 2018-03-27

## 2018-03-27 ENCOUNTER — PATIENT OUTREACH (OUTPATIENT)
Dept: FAMILY MEDICINE CLINIC | Age: 63
End: 2018-03-27

## 2018-03-27 NOTE — Clinical Note
Patient needs refill for Lactulose, asking for GI acid medication? I will call patient when the order goes to the [facility]. My note: Pedro Bishop is C/O bloody emesis via voice mail. Kirera Simpson states that she had the emesis 2 days ago. Emesis described as coffee grounds, denies N/V as of last episode 2 days ago, denies SOB, dark stools. C/O dizziness. NN described Sx/Sx that warrant a visit to ED ASAP. Maranda able to repeat correctly. Kierra Simpson is cancelling FU on 3/29/18, due to transportation. Maranda asked for a refill for lactulose. And asked about a medicine for her \"burning acid in her stomach\".  NN will In box Krystian Donahue

## 2018-03-27 NOTE — TELEPHONE ENCOUNTER
Called back w/ assistance of [employer] phone  # [phone]. General phone message left that [facility] nurse was returning call and if she is vomiting blood she should go to an ER. Routing to provider Bev Jin NP for review.
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
## 2018-03-27 NOTE — TELEPHONE ENCOUNTER
Patient asked that we call in a prescription for her because she is vomiting blood. I asked if she thought she should go to ED, and she said that would not do any good; however, she could not wait until her appointment with us on Thursday. Please call her.     Topher Montgomery

## 2018-03-28 NOTE — TELEPHONE ENCOUNTER
Tc placed to pt to f/u. Spoke to pt over phone together with MIRELA Wells and per pt report she has been vomiting \"black blood\". Pt reported she was too sick to drive to ED and did not call 911 because of cost, lives in [place] and has care card for [facility]. Pt states she is not vomiting anymore but needed to cancel her appt at [facility] and cancel the Lisseth Nadia B appt also due to transport issues, as  is working and unable to get off. Pt was agreeable to at least have  drive her to [place] to an ED this Saturday and call 911 if worsens. Pt warned of the danger of not going to hospital and verbalized understanding of this plan. Phone call time was total approx 20 minutes.

## 2018-03-31 ENCOUNTER — HOSPITAL ENCOUNTER (EMERGENCY)
Age: 63
Discharge: HOME | End: 2018-03-31
Attending: EMERGENCY MEDICINE
Payer: SUBSIDIZED

## 2018-03-31 VITALS
TEMPERATURE: 98.3 F | OXYGEN SATURATION: 99 % | BODY MASS INDEX: 41.23 KG/M2 | HEART RATE: 72 BPM | WEIGHT: 210 LBS | DIASTOLIC BLOOD PRESSURE: 80 MMHG | SYSTOLIC BLOOD PRESSURE: 180 MMHG | HEIGHT: 60 IN | RESPIRATION RATE: 20 BRPM

## 2018-03-31 DIAGNOSIS — R04.0 BLEEDING NOSE: Primary | ICD-10-CM

## 2018-03-31 LAB
ALBUMIN SERPL-MCNC: 3 G/DL (ref 3.5–5)
ALBUMIN/GLOB SERPL: 0.8 {RATIO} (ref 1.1–2.2)
ALP SERPL-CCNC: 131 U/L (ref 45–117)
ALT SERPL-CCNC: 35 U/L (ref 12–78)
ANION GAP SERPL CALC-SCNC: 6 MMOL/L (ref 5–15)
AST SERPL-CCNC: 28 U/L (ref 15–37)
BILIRUB SERPL-MCNC: 0.9 MG/DL (ref 0.2–1)
BUN SERPL-MCNC: 15 MG/DL (ref 6–20)
BUN/CREAT SERPL: 31 (ref 12–20)
CALCIUM SERPL-MCNC: 8.4 MG/DL (ref 8.5–10.1)
CHLORIDE SERPL-SCNC: 114 MMOL/L (ref 97–108)
CO2 SERPL-SCNC: 25 MMOL/L (ref 21–32)
CREAT SERPL-MCNC: 0.49 MG/DL (ref 0.55–1.02)
GLOBULIN SER CALC-MCNC: 4 G/DL (ref 2–4)
GLUCOSE SERPL-MCNC: 141 MG/DL (ref 65–100)
INR BLD: 1 (ref 0.9–1.2)
POTASSIUM SERPL-SCNC: 3.7 MMOL/L (ref 3.5–5.1)
PROT SERPL-MCNC: 7 G/DL (ref 6.4–8.2)
SODIUM SERPL-SCNC: 145 MMOL/L (ref 136–145)

## 2018-03-31 PROCEDURE — 85025 COMPLETE CBC W/AUTO DIFF WBC: CPT | Performed by: EMERGENCY MEDICINE

## 2018-03-31 PROCEDURE — 80053 COMPREHEN METABOLIC PANEL: CPT | Performed by: EMERGENCY MEDICINE

## 2018-03-31 PROCEDURE — 36415 COLL VENOUS BLD VENIPUNCTURE: CPT | Performed by: EMERGENCY MEDICINE

## 2018-03-31 PROCEDURE — 99283 EMERGENCY DEPT VISIT LOW MDM: CPT

## 2018-03-31 PROCEDURE — 74011000250 HC RX REV CODE- 250: Performed by: EMERGENCY MEDICINE

## 2018-03-31 PROCEDURE — 85610 PROTHROMBIN TIME: CPT

## 2018-03-31 RX ORDER — SILVER NITRATE 38.21; 12.74 MG/1; MG/1
1 STICK TOPICAL
Status: COMPLETED | OUTPATIENT
Start: 2018-03-31 | End: 2018-03-31

## 2018-03-31 RX ADMIN — SILVER NITRATE APPLICATORS 1 APPLICATOR: 25; 75 STICK TOPICAL at 15:52

## 2018-03-31 NOTE — DISCHARGE INSTRUCTIONS
Hemorragias nasales: Instrucciones de cuidado - [ Nosebleeds: Care Instructions ]  Instrucciones de [facility] hem[facility]ragias (sangrados) nasales son comunes, sobre todo si usted tiene resfriados o alergias. Hay muchas cosas que pueden causar lam hemorragia nasal.  Algunas hemorragias nasales se detienen por sí solas con presión. Otras requieren taponamiento. Algunas se cauterizan (sellan). Si tiene gasa u otro material para taponar la nariz, deberá hacer lam visita de seguimiento con tam médico para le sea retirado el tapón. Puede que requiera tratamiento adicional si tiene hemorragias nasales con frecuencia. El médico lo alvarado examinado minuciosamente, jaime más tarde pueden presentarse problemas. Si nota algún problema o síntoma nuevo, busque tratamiento médico de inmediato. La atención de seguimiento es lam parte clave de tam tratamiento y seguridad. Asegúrese de hacer y acudir a todas las citas, y llame a tam médico si está teniendo problemas. También es lam buena idea saber los resultados de los exámenes y mantener lam lista de los medicamentos que bony. ¿Cómo puede cuidarse en el hogar? · Si usted tiene otra hemorragia nasal:  ¨ Siéntese e incline la felicitas un poco hacia adelante. [place] impide que la fernanda vaya hacia la garganta. ¨ Use los dedos pulgar e índice para apretarse la nariz y cerrarla por 10 minutos. Use un reloj. No verifique si se ha detenido la hemorragia antes de que transcurran 10 minutos. Si no se detiene la hemorragia, apriétese la nariz por 10 minutos más. ¨ Cuando se haya detenido la hemorragia, trate de no hurgarse, frotarse ni sonarse la nariz por 12 horas. Evitar estas cosas ayuda a impedir que [employer] nariz de [place]. · Si tam médico le recetó antibióticos, tómelos según las indicaciones. No deje de tomarlos solo porque se sienta mejor. Debe monika todos los antibióticos hasta terminarlos. [address] hemorragias nasales  · No se suene la nariz con mucha fuerza.   · Evite levantar cosas o esforzarse después de lam hemorragia nasal.  · Eleve la felicitas con lam almohada mientras duerme. · Póngase lam capa delgada de gel nasal a base de [place] o de [address] [place] [address], ofelia NasoGel, dentro de la [place]. Póngaselo en el tabique, el cual divide las fosas nasales. [place] prevendrá la sequedad que puede causar hemorragias nasales. · Use un vaporizador o humidificador para añadirle humedad a tam dormitorio. Siga las instrucciones para limpiar el aparato. · No tome aspirina, ibuprofeno (Advil, Motrin) o naproxeno (Aleve) por un período de 36 a 48 horas después de lam hemorragia nasal, a menos que tam médico se lo indique. Puede usar acetaminofén (Tylenol) para aliviar el dolor. · Hable con tam médico acerca de suspender cualquier otro medicamento que esté tomando. Algunos medicamentos podrían aumentar las probabilidades de que tenga lam hemorragia nasal.  · No utilice medicamentos para el resfriado ni aerosoles nasales sin hablar marta con tam médico. Pueden secarle la nariz. ¿Cuándo debes pedir ayuda? Llama al 911 toda vez que pienses que puedes necesitar atención de [place]. Por ejemplo, llama si:  ? · Te desmayaste (perdiste el conocimiento). ?[place] a tu médico ahora mismo o busca atención médica inmediata si:  ? · Tienes otra hemorragia nasal y te sigue sangrando la nariz después de haberte hecho presión 3 veces por 10 minutos (30 minutos en total). ? · Hay mucha fernanda que te baja por detrás de la garganta aunque te hayas presionado la nariz e inclinado la felicitas hacia adelante. ? · Tienes fiebre. ? · Tienes dolor en los senos paranasales. ?Presta especial atención a los cambios en tu sreekanth y asegúrate de comunicarte con tu médico si:  ? · Tienes hemorragias nasales a menudo, incluso si se detienen. ? · No mejoras ofelia se esperaba. ¿Dónde puede encontrar más información en inglés? Shannon Stevenson a http://[email or web address]-brian.info/.   Escriba S156 en la búsqueda para aprender [facility] acerca de \"Hemorragias nasales: Instrucciones de cuidado - [ Nosebleeds: Care Instructions ]. \"  Revisado: [address] 2017  Versión del contenido: 11.4  © 5868-4053 Healthwise, Incorporated. Las instrucciones de cuidado fueron adaptadas bajo licencia por [employer] (which disclaims liability or warranty for this information). Si usted tiene [place] afección médica o sobre estas instrucciones, siempre pregunte a tam profesional de sreekanth. Healthwise, Incorporated niega toda garantía o responsabilidad por tam uso de esta información.

## 2018-03-31 NOTE — ED PROVIDER NOTES
HPI Comments: 43-year-old female presenting for nosebleeds. Patient has a history of hepatitis. She reports nosebleed yesterday and then this morning. She denies injury or trauma to the nose. She blowing her nose. No foreign bodies to the nose. No fevers or chills. No nausea or vomiting. No abdominal pain. No bloody stool. No bloody vomiting. No bleeding from gums. The nose is not actively bleeding at this time. Patient reports bleeding was out of the left nare. Bleeding resolves by itself. Social hx  Nonsmoker  No alcohol    The history is provided by the patient. Past Medical History:   Diagnosis Date    Arthritis     Shoulders, Hips, Knees, Ankles, Back    Esophageal varices ([address]     Liver disease 2017    Cirrohis    Neurological disorder        Past Surgical History:   Procedure Laterality Date    COLONOSCOPY Left 1/10/2018    COLONOSCOPY performed by Susy Roger. David Langston MD at [facility] ENDOSCOPY    HX CHOLECYSTECTOMY      HX GI      upper GI    HX GYN               History reviewed. No pertinent family history. Social History     Social History    Marital status:      Spouse name: N/A    Number of children: N/A    Years of education: N/A     Occupational History    Not on file. Social History Main Topics    Smoking status: Former Smoker     Quit date: 1996    Smokeless tobacco: Never Used    Alcohol use No    Drug use: No    Sexual activity: Not on file     Other Topics Concern    Not on file     Social History Narrative         ALLERGIES: Morphine    Review of Systems   Constitutional: Negative for chills and fever. HENT: Positive for nosebleeds. Negative for congestion, rhinorrhea and sore throat. Respiratory: Negative for cough and shortness of breath. Cardiovascular: Negative for chest pain. Gastrointestinal: Negative for abdominal pain, blood in stool, nausea and vomiting.    Musculoskeletal: Negative for back pain, neck pain and neck stiffness. Skin: Negative for color change and rash. Neurological: Negative for dizziness, light-headedness and headaches. All other systems reviewed and are negative. Vitals:    03/31/18 1300   BP: 180/80   Pulse: 72   Resp: 20   Temp: 98.3 °F (36.8 °C)   SpO2: 99%   Weight: 95.3 kg (210 lb)   Height: 5' (1.524 m)            Physical Exam   Constitutional: She is oriented to person, place, and time. She appears well-developed and well-nourished. No distress. HENT:   Head: Normocephalic and atraumatic. Left nare:  Friable skin  No active bleeding. Dried blood present. No blood in posterior pharynx   Eyes: Conjunctivae and EOM are normal. Pupils are equal, round, and reactive to light. Neck: Normal range of motion. Neck supple. Cardiovascular: Normal rate and regular rhythm. Pulmonary/Chest: Effort normal and breath sounds normal.   Musculoskeletal: Normal range of motion. Neurological: She is alert and oriented to person, place, and time. No cranial nerve deficit. She exhibits normal muscle tone. Coordination normal.   Skin: Skin is warm and dry. No rash noted. No erythema. Psychiatric: She has a normal mood and affect. Her behavior is normal. Judgment and thought content normal.   Nursing note and vitals reviewed. MDM  Number of Diagnoses or Management Options  Bleeding nose:   Diagnosis management comments: 72-year-old female presenting for nosebleed. She is not actively bleeding. Patient with friable skin over the left knee. History of hepatitis. Plan: silver Nitrate, labs, monitor    4:20 PM  No bleeding during observation. Platelet slightly low. No active bleeding. No bleeding from other locations. Will discharge home with follow-up. Patient's results have been reviewed with them.   Patient and/or family have verbally conveyed their understanding and agreement of the patient's signs, symptoms, diagnosis, treatment and prognosis and additionally agree to follow up as recommended or return to the Emergency Room should their condition change prior to follow-up. Discharge instructions have also been provided to the patient with some educational information regarding their diagnosis as well a list of reasons why they would want to return to the ER prior to their follow-up appointment should their condition change. ED Course       Procedures    Pt case including HPI, PE, and all available lab and radiology results has been discussed with attending physician. Opportunity to evaluate patient has been provided to ER attending. Discharge and prescription plan has been agreed upon.

## 2018-04-01 LAB
BASOPHILS # BLD: 0 K/UL (ref 0–0.1)
BASOPHILS NFR BLD: 0 % (ref 0–1)
DIFFERENTIAL METHOD BLD: ABNORMAL
EOSINOPHIL # BLD: 0 K/UL (ref 0–0.4)
EOSINOPHIL NFR BLD: 2 % (ref 0–7)
ERYTHROCYTE [DISTWIDTH] IN BLOOD BY AUTOMATED COUNT: 17.8 % (ref 11.5–14.5)
HCT VFR BLD AUTO: 28.6 % (ref 35–47)
HGB BLD-MCNC: 8.6 G/DL (ref 11.5–16)
IMM GRANULOCYTES # BLD: 0 K/UL (ref 0–0.04)
IMM GRANULOCYTES NFR BLD AUTO: 0 % (ref 0–0.5)
LYMPHOCYTES # BLD: 0.4 K/UL (ref 0.8–3.5)
LYMPHOCYTES NFR BLD: 22 % (ref 12–49)
MCH RBC QN AUTO: 29.4 PG (ref 26–34)
MCHC RBC AUTO-ENTMCNC: 30.1 G/DL (ref 30–36.5)
MCV RBC AUTO: 97.6 FL (ref 80–99)
MONOCYTES # BLD: 0.1 K/UL (ref 0–1)
MONOCYTES NFR BLD: 7 % (ref 5–13)
NEUTS SEG # BLD: 1.5 K/UL (ref 1.8–8)
NEUTS SEG NFR BLD: 69 % (ref 32–75)
NRBC # BLD: 0 K/UL (ref 0–0.01)
NRBC BLD-RTO: 0 PER 100 WBC
PATH REV BLD -IMP: ABNORMAL
PLATELET # BLD AUTO: 49 K/UL (ref 150–400)
PLATELET COMMENTS,PCOM: ABNORMAL
PMV BLD AUTO: 11.6 FL (ref 8.9–12.9)
RBC # BLD AUTO: 2.93 M/UL (ref 3.8–5.2)
RBC MORPH BLD: ABNORMAL
RBC MORPH BLD: ABNORMAL
WBC # BLD AUTO: 2 K/UL (ref 3.6–11)
WBC MORPH BLD: ABNORMAL

## 2018-04-03 ENCOUNTER — PATIENT OUTREACH (OUTPATIENT)
Dept: FAMILY MEDICINE CLINIC | Age: 63
End: 2018-04-03

## 2018-04-03 NOTE — LETTER
4/13/2018 11:29 AM 
 
 
 
Ms. Bach Feeling [address] [address] Mi nombre mary Young RN, pro favor de llamarme al [phone]. Maria C Macias, Brenda Pulido, [place] Doherty RN, CMSRN  Nurse Navigator, Respecting Choices® [facility] Facilitator [address] [facility] [address] [facility] [address] [place] [address] 13, [address] [facility] 
[phone] (w)  [phone]. (c)  [phone] (f) [email or web address]  [email or web address]

## 2018-04-06 NOTE — PROGRESS NOTES
4/3/18  NN left message for Radha Solano. 4/6/18  NN left message for Radha Solano with contact information.

## 2018-04-20 ENCOUNTER — PATIENT OUTREACH (OUTPATIENT)
Dept: FAMILY MEDICINE CLINIC | Age: 63
End: 2018-04-20

## 2018-04-20 NOTE — PROGRESS NOTES
4/20/18  Goals      Develop action plan for Self-Management Chronic Disease. Cirrohsis, espohageal varices. 3/27/18    Maranda Cecilio [place]illas [place] is C/O bloody emesis via voice mail. Susana Juarez states that she had the emesis 2 days ago. Emesis described as coffee grounds, denies N/V as of last episode 2 days ago, denies SOB, dark stools. C/O dizziness. NN described Sx/Sx that warrant a visit to ED ASAP. Maranda able to repeat correctly. Susana Juarez is cancelling FU on 3/29/18, due to transportation. Maranda asked for a refill for lactulose. And asked about a medicine for her \"burning acid in her stomach\". NN will In box Dr Clau Eagle Pa for recommendations. FU in 3 day. IM  4/20/18  Maranda Stroud will complete FU visits to Bandar Swain, and Abel Becerra. Denies bleeding at this time. Decline an earlier appointment. FU in 1 month. IM         relationship with PCP            Maranda Stroud will keep FU appointment. IM    11/16/17  Poor adherence with PCP appointments, patent states due to remembering the dates. NN asked that patient will write down appointment date and a reminder call from NN initially. Keep appointment on 11/20/17 at [facility]. IM.    1/22/18 Susana Ann Hawk Stroud will keep appointment with Dr Ana Gleason on 1/25/18 and James Morgan on march 8, 2018. FU on 1/29/18.   IM

## 2018-05-09 ENCOUNTER — HOSPITAL ENCOUNTER (OUTPATIENT)
Dept: ULTRASOUND IMAGING | Age: 63
Discharge: HOME | End: 2018-05-09
Attending: PHYSICIAN ASSISTANT
Payer: SUBSIDIZED

## 2018-05-09 ENCOUNTER — OFFICE VISIT (OUTPATIENT)
Dept: HEMATOLOGY | Age: 63
End: 2018-05-09

## 2018-05-09 VITALS
TEMPERATURE: 96.5 F | WEIGHT: 208 LBS | OXYGEN SATURATION: 97 % | SYSTOLIC BLOOD PRESSURE: 178 MMHG | BODY MASS INDEX: 40.62 KG/M2 | HEART RATE: 67 BPM | DIASTOLIC BLOOD PRESSURE: 62 MMHG

## 2018-05-09 DIAGNOSIS — K74.60 CIRRHOSIS OF LIVER WITH ASCITES, UNSPECIFIED HEPATIC CIRRHOSIS TYPE (HCC): Primary | ICD-10-CM

## 2018-05-09 DIAGNOSIS — K76.6 PORTAL HYPERTENSION (HCC): ICD-10-CM

## 2018-05-09 DIAGNOSIS — K74.60 CIRRHOSIS OF LIVER WITHOUT ASCITES, UNSPECIFIED HEPATIC CIRRHOSIS TYPE (HCC): ICD-10-CM

## 2018-05-09 DIAGNOSIS — R18.8 CIRRHOSIS OF LIVER WITH ASCITES, UNSPECIFIED HEPATIC CIRRHOSIS TYPE (HCC): Primary | ICD-10-CM

## 2018-05-09 DIAGNOSIS — B18.2 CHRONIC HEPATITIS C WITHOUT HEPATIC COMA (HCC): ICD-10-CM

## 2018-05-09 DIAGNOSIS — R60.9 EDEMA, UNSPECIFIED TYPE: ICD-10-CM

## 2018-05-09 DIAGNOSIS — I10 ESSENTIAL HYPERTENSION: ICD-10-CM

## 2018-05-09 PROCEDURE — 76700 US EXAM ABDOM COMPLETE: CPT

## 2018-05-09 RX ORDER — SPIRONOLACTONE 100 MG/1
100 TABLET, FILM COATED ORAL DAILY
Qty: 90 TAB | Refills: 3 | Status: SHIPPED | OUTPATIENT
Start: 2018-05-09 | End: 2018-11-20 | Stop reason: SDUPTHER

## 2018-05-09 RX ORDER — LACTULOSE 10 G/15ML
10 SOLUTION ORAL 2 TIMES DAILY
Qty: 1000 ML | Refills: 1 | Status: SHIPPED | OUTPATIENT
Start: 2018-05-09 | End: 2019-01-31 | Stop reason: SDUPTHER

## 2018-05-09 RX ORDER — FUROSEMIDE 40 MG/1
40 TABLET ORAL DAILY
Qty: 90 TAB | Refills: 3 | Status: SHIPPED | OUTPATIENT
Start: 2018-05-09 | End: 2018-11-20 | Stop reason: SDUPTHER

## 2018-05-09 NOTE — PROGRESS NOTES
1. Have you been to the ER, urgent care clinic since your last visit? Hospitalized since your last visit? No    2. Have you seen or consulted any other health care providers outside of the [facility] since your last visit? Include any pap smears or colon screening. No   Chief Complaint   Patient presents with    Follow-up     Visit Vitals    /62 (BP 1 Location: Left arm, BP Patient Position: Sitting)    Pulse 67    Temp 96.5 °F (35.8 °C) (Tympanic)    Wt 208 lb (94.3 kg)    SpO2 97%    BMI 40.62 kg/m2     PHQ over the last two weeks 5/9/2018   Little interest or pleasure in doing things Not at all   Feeling down, depressed or hopeless Not at all   Total Score PHQ 2 0     Learning Assessment 5/9/2018   PRIMARY LEARNER Patient   HIGHEST LEVEL OF EDUCATION - PRIMARY LEARNER  -   BARRIERS PRIMARY LEARNER NONE   CO-LEARNER CAREGIVER No   CO-LEARNER NAME -   Colt Dill [address] -    [facility] -   ANSWERED BY patient    RELATIONSHIP SELF     Abuse Screening Questionnaire 5/9/2018   Do you ever feel afraid of your partner? N   Are you in a relationship with someone who physically or mentally threatens you? N   Is it safe for you to go home?  Anuradha Ch
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
## 2018-05-09 NOTE — MR AVS SNAPSHOT
[address] [facility] [place] [id number] [address] 
[phone] Patient: Christin Dumont MRN: [id number] FZR:[birth year]05 Visit Information Berta Howard y Homa Personal Médico Departamento Teléfono del Dep. Número de visita 5/9/2018  8:30 AM Antonio Rudolph, [address] of [place] [address] [phone] Your Appointments 5/10/2018  9:30 AM  
New Patient with Kalyan Bush LCSW [address] ([address] [facility]) Appt Note: New Patient [address]  
[phone]  
  
   
 [address] [facility] [address]  
  
    
 5/10/2018 10:30 AM  
Follow Up with Casper Collins MD  
[address] ([facility]) Appt Note: f/u per; made by JOSE (jaleesa) rescheduled  3/28 [address] [place] [address]  
[phone]  
  
   
 [address] [facility] [address]  
  
    
 8/8/2018  2:30 PM  
Follow Up with VIDHYA [address] ([address] [facility]) Appt Note: Follow up [address] [facility] [place] [id number] [facility] [address]  
[address] [place] [address] Upcoming Health Maintenance Date Due Pneumococcal 19-64 Medium Risk (1 of 1 - PPSV23) 3/5/1974 DTaP/Tdap/Td series (1 - Tdap) 3/5/1976 PAP AKA CERVICAL CYTOLOGY 3/5/1976 BREAST CANCER SCRN MAMMOGRAM 3/5/2005 ZOSTER VACCINE AGE 60> 1/5/2015 Influenza Age 5 to Adult 8/1/2018 FOBT Q 1 YEAR AGE 50-75 8/17/2018 Al[place]  Review Complete El: 5/9/2018 Por: Kahlil Luevano A partir del:  5/9/2018 Intensidad Anotado Tipo de reacción [employer] Morphine  03/11/2011    Nausea and Vomiting, Other (comments) Histamine release? Red streak on arm after IV injection Vacunas actuales Revisadas el:  8/20/2017 Cindia Dy Influenza Vaccine (Quad) PF 11/20/2017, 10/20/2016 No revisadas esta visita You Were Diagnosed With   
  
 Kristopher Marrero Cirrhosis of liver with ascites, unspecified hepatic cirrhosis type ([facility]    -  Primary ICD-10-CM: K74.60 ICD-9-CM: 571.5 Edema, unspecified type     ICD-10-CM: R60.9 ICD-9-CM: 782.3 Essential hypertension     ICD-10-CM: I10 
ICD-9-CM: 401.9 Portal hypertension (HCC)     ICD-10-CM: K76.6 ICD-9-CM: 232. 3 Chronic hepatitis C without hepatic coma (HCC)     ICD-10-CM: B18.2 ICD-9-CM: 070.54 Partes vitales PS Pulso Temperatura Peso (percentil de crecimiento) SpO2 BMI (C)  
 178/62 (BP 1 Location: Left arm, BP Patient Position: Sitting) 67 96.5 °F (35.8 °C) (Tympanic) 208 lb (94.3 kg) 97% 40.62 kg/m2 Estado obstétrico Estatus de tabaquísmo Postmenopausal Former Smoker BMI and BSA Data Body Mass Index Body Surface Area  
 40.62 kg/m 2 2 m 2 [facility] Pharmacy Name Phone [facility] [address],First [address], [address] [facility] AT [place] [address] [phone] Tam lista de medicamentos actualizada Leata Lidya actualizada 5/9/18  8:58 AM.  Ana Lied use tam lista de medicamentos más reciente. diclofenac 1 % Gel También conocido ofelia:  VOLTAREN Apply 2 g to affected area four (4) times daily. ferrous sulfate 325 mg (65 mg iron) tablet Take 1 Tab by mouth two (2) times a day. furosemide 40 mg tablet También conocido ofelia:  LASIX Take 1 Tab by mouth daily. lactulose 20 gram/30 mL Soln solution También conocido ofelia:  Mariella Quirk Take 15 mL by mouth two (2) times a day. lisinopril 20 mg tablet También conocido ofelia:  [employer] Take 1 Tab by mouth daily. Korean instructions  
  
 propranolol 10 mg tablet También conocido ofelia:  INDERAL Take 1 Tab by mouth three (3) times daily. spironolactone 100 mg tablet También conocido ofelia:  ALDACTONE Take 1 Tab by mouth daily. Recetas Enviado a la [place] Refills  
 furosemide (LASIX) 40 mg tablet 3 Sig: Take 1 Tab by mouth daily. Class: Normal  
 Pharmacy: [address],First Floor, [address] [facility] AT [place] [address] [phone] Ph #: [phone] Route: Oral  
 spironolactone (ALDACTONE) 100 mg tablet 3 Sig: Take 1 Tab by mouth daily. Class: Normal  
 Pharmacy: [address] Floor, [address] [facility] AT [place] [address] [phone] Ph #: [phone] Route: Oral  
 lactulose (CHRONULAC) 20 gram/30 mL soln solution 1 Sig: Take 15 mL by mouth two (2) times a day. Class: Normal  
 Pharmacy: [address],[facility], [address] [facility] AT [place] [address] [phone] Ph #: [phone] Route: Oral  
  
Hicimos lo siguiente AFP WITH AFP-L3% [[id number] Custom] CBC W/O DIFF [52975 CPT(R)] FERRITIN [28140 CPT(R)] HEPATIC FUNCTION PANEL (6) [[id number] Custom] IRON PROFILE [id number] CPT(R)] METABOLIC PANEL, BASIC [13919 CPT(R)] PROTHROMBIN TIME + INR [60572 CPT(R)] Por hacer 05/09/2018 9:30 AM  
  Appointment with [address] [place] [address] at [facility] ([phone]) General  NPO DIET RESTICTIONS Please be NPO (nothing by mouth) for 6- 8 hours prior to procedure. GENERAL INSTRUCTIONS 1. Bring any non [facility] facility films/reports pertaining to the area being studied with you on the day of appointment. 2. A written order with a valid diagnosis and Physicians signature is required for all scheduled tests. 3. Check in at registration 30 minutes before your appointment time unless you were instructed to do otherwise. Introducing [facility]! [facility] introduce portal taz wahl acceder a partes de tam expediente médico, enviar por correo electrónico la oficina de tam médico y solicitar renovaciones de medicamentos en línea. En tam navegador de Internet , Deirdre Crews a [email or web address] Harjinder clic en el usuario por Strang Lone? Azalia davey aquí en la sesión Fei Handy. Verá la página de registro [place]. Ingrese tam código de nuMVC of [place] magalys y ofelia aparece a continuación. Usted no tendrá que [employer] código después de sukhdeep completado el proceso de registro . Si usted no se inscribe antes de la fecha de caducidad , debe solicitar un nuevo código. · MyChart Código de acceso : [id number] Expires: 6/7/2018  9:58 AM 
 
Ingresa los últimos cuatro dígitos de tam Número de Seguro Social ( xxxx ) y fecha de nacimiento ( dd / mm / aaaa ) ofelia se indica y harjinder clic en Enviar. Sebas será llevado a la siguiente página de registro . Crear un ID MyChart . Esta será tam ID de inicio de sesión de MyChart y no puede ser [place] , por lo que pensar en lam que es [place] y fácil de recordar . Crear lam contraseña MyChart . Usted puede cambiar tam contraseña en cualquier momento . Ingrese tam Password Reset de preguntas y [place] . [place] se puede utilizar en un momento posterior si usted olvida tam contraseña. Introduzca tam dirección de correo electrónico . Qian Foreman recibirá lam notificación por correo electrónico cuando la nueva información está disponible en MyChart . Vinayaka Garland coombsic en Registrarse. Niels Jaimes víctor y descargar porciones de tam expediente médico. 
Harjinder clic en el enlace de descarga del menú Resumen para descargar lam copia portátil de tam información médica . Si tiene Orion Ritter , por favor visite la sección de preguntas frecuentes del sitio web MyChart . Recuerde, MyChart NO es que se utilizará para las necesidades urgentes. Para emergencias médicas , llame al 911 . Ahora disponible en tam iPhone y Android ! Por favor proporcione tameka resumen de la documentación de cuidado a tam próximo proveedor. Your primary care clinician is listed as Perrin Goodell. If you have any questions after today's visit, please call [phone].

## 2018-05-10 ENCOUNTER — OFFICE VISIT (OUTPATIENT)
Dept: FAMILY MEDICINE CLINIC | Age: 63
End: 2018-05-10

## 2018-05-10 VITALS
WEIGHT: 207.8 LBS | HEART RATE: 81 BPM | SYSTOLIC BLOOD PRESSURE: 166 MMHG | HEIGHT: 61 IN | TEMPERATURE: 97.7 F | DIASTOLIC BLOOD PRESSURE: 59 MMHG | BODY MASS INDEX: 39.23 KG/M2

## 2018-05-10 DIAGNOSIS — M25.562 CHRONIC PAIN OF BOTH KNEES: ICD-10-CM

## 2018-05-10 DIAGNOSIS — I10 ESSENTIAL HYPERTENSION: Primary | ICD-10-CM

## 2018-05-10 DIAGNOSIS — Z13.9 ENCOUNTER FOR SCREENING: ICD-10-CM

## 2018-05-10 DIAGNOSIS — Z63.9 FAMILY DYSFUNCTION: ICD-10-CM

## 2018-05-10 DIAGNOSIS — G89.29 CHRONIC PAIN OF BOTH KNEES: ICD-10-CM

## 2018-05-10 DIAGNOSIS — D64.9 ANEMIA, UNSPECIFIED TYPE: ICD-10-CM

## 2018-05-10 DIAGNOSIS — F33.1 MODERATE EPISODE OF RECURRENT MAJOR DEPRESSIVE DISORDER (HCC): Primary | ICD-10-CM

## 2018-05-10 DIAGNOSIS — M25.561 CHRONIC PAIN OF BOTH KNEES: ICD-10-CM

## 2018-05-10 DIAGNOSIS — K74.60 CIRRHOSIS OF LIVER WITHOUT ASCITES, UNSPECIFIED HEPATIC CIRRHOSIS TYPE (HCC): ICD-10-CM

## 2018-05-10 LAB
AFP L3 MFR SERPL: 7.5 % (ref 0–9.9)
AFP SERPL-MCNC: 4.3 NG/ML (ref 0–8)
ALBUMIN SERPL-MCNC: 4 G/DL (ref 3.6–4.8)
ALP SERPL-CCNC: 129 IU/L (ref 39–117)
ALT SERPL-CCNC: 29 IU/L (ref 0–32)
AST SERPL-CCNC: 32 IU/L (ref 0–40)
BILIRUB DIRECT SERPL-MCNC: 0.51 MG/DL (ref 0–0.4)
BILIRUB SERPL-MCNC: 1.4 MG/DL (ref 0–1.2)
BUN SERPL-MCNC: 12 MG/DL (ref 8–27)
BUN/CREAT SERPL: 24 (ref 12–28)
CALCIUM SERPL-MCNC: 8.8 MG/DL (ref 8.7–10.3)
CHLORIDE SERPL-SCNC: 105 MMOL/L (ref 96–106)
CO2 SERPL-SCNC: 22 MMOL/L (ref 18–29)
CREAT SERPL-MCNC: 0.49 MG/DL (ref 0.57–1)
ERYTHROCYTE [DISTWIDTH] IN BLOOD BY AUTOMATED COUNT: 16 % (ref 12.3–15.4)
FERRITIN SERPL-MCNC: 16 NG/ML (ref 15–150)
GFR SERPLBLD CREATININE-BSD FMLA CKD-EPI: 104 ML/MIN/1.73
GFR SERPLBLD CREATININE-BSD FMLA CKD-EPI: 120 ML/MIN/1.73
GLUCOSE SERPL-MCNC: 91 MG/DL (ref 65–99)
HCT VFR BLD AUTO: 31.8 % (ref 34–46.6)
HGB BLD-MCNC: 10.2 G/DL (ref 11.1–15.9)
HGB BLD-MCNC: 10.5 G/DL
INR PPP: 1.1 (ref 0.8–1.2)
IRON SATN MFR SERPL: 13 % (ref 15–55)
IRON SERPL-MCNC: 61 UG/DL (ref 27–139)
MCH RBC QN AUTO: 28.3 PG (ref 26.6–33)
MCHC RBC AUTO-ENTMCNC: 32.1 G/DL (ref 31.5–35.7)
MCV RBC AUTO: 88 FL (ref 79–97)
MORPHOLOGY BLD-IMP: ABNORMAL
PLATELET # BLD AUTO: ABNORMAL X10E3/UL
POTASSIUM SERPL-SCNC: 3.9 MMOL/L (ref 3.5–5.2)
PROTHROMBIN TIME: 11.4 SEC (ref 9.1–12)
RBC # BLD AUTO: 3.6 X10E6/UL (ref 3.77–5.28)
SODIUM SERPL-SCNC: 142 MMOL/L (ref 134–144)
TIBC SERPL-MCNC: 480 UG/DL (ref 250–450)
UIBC SERPL-MCNC: 419 UG/DL (ref 118–369)
WBC # BLD AUTO: 2.4 X10E3/UL (ref 3.4–10.8)

## 2018-05-10 RX ORDER — TRAMADOL HYDROCHLORIDE 50 MG/1
25 TABLET ORAL
Qty: 30 TAB | Refills: 0 | Status: SHIPPED | OUTPATIENT
Start: 2018-05-10 | End: 2018-09-10 | Stop reason: SDUPTHER

## 2018-05-10 RX ORDER — LISINOPRIL 20 MG/1
20 TABLET ORAL DAILY
Qty: 90 TAB | Refills: 1 | Status: SHIPPED | OUTPATIENT
Start: 2018-05-10 | End: 2018-08-07 | Stop reason: SDUPTHER

## 2018-05-10 RX ORDER — LANOLIN ALCOHOL/MO/W.PET/CERES
325 CREAM (GRAM) TOPICAL 2 TIMES DAILY
Qty: 60 TAB | Refills: 2 | Status: SHIPPED | OUTPATIENT
Start: 2018-05-10 | End: 2019-06-18

## 2018-05-10 SDOH — SOCIAL STABILITY - SOCIAL INSECURITY: PROBLEM RELATED TO PRIMARY SUPPORT GROUP, UNSPECIFIED: Z63.9

## 2018-05-10 NOTE — MR AVS SNAPSHOT
[address] [facility] 
 
 
 [address] [facility] 
[phone] Patient: Mikala Gillespie MRN: [id number] GIN:3[birth year] Visit Information Julian Oneill y [place]un[place] Personal Médico Departamento Teléfono del Dep. Número de visita 5/10/2018 10:30 AM Jenae Julian MD [address] [phone] Follow-up Instructions Return in about 4 weeks (around 6/7/2018) for Knee pain, HTN and anemia follow up appt. Your Appointments 6/19/2018 12:45 PM  
Follow Up with Jenae Julian MD  
[facility] ([facility]) Appt Note: Follow up approved EP; by marycruz [address] [facility] [address]  
[phone]  
  
   
 [address] [facility] [address]  
  
    
 8/8/2018  2:30 PM  
Follow Up with VIDHYA Archer 75 ([facility]) Appt Note: Follow up 200 [facility] [phone] [facility] [address]  
[address] Upcoming Health Maintenance Date Due Pneumococcal 19-64 Medium Risk (1 of 1 - PPSV23) 3/5/1974 DTaP/Tdap/Td series (1 - Tdap) 3/5/1976 PAP AKA CERVICAL CYTOLOGY 3/5/1976 BREAST CANCER SCRN MAMMOGRAM 3/5/2005 ZOSTER VACCINE AGE 60> 1/5/2015 Influenza Age 5 to Adult 8/1/2018 FOBT Q 1 YEAR AGE 50-75 8/17/2018 Alergias  Review Complete El: 5/10/2018 Por: Zainab Carlson A partir del:  5/10/2018 Intensidad Anotado Tipo de reacción [employer] Morphine  03/11/2011    Nausea and Vomiting, Other (comments) Histamine release? Red streak on arm after IV injection Vacunas actuales Revisadas el:  8/20/2017 Leonila Manzo Influenza Vaccine (Quad) PF 11/20/2017, 10/20/2016 No revisadas esta visita You Were Diagnosed With   
  
 [address] Essential hypertension    -  Primary ICD-10-CM: I10 
ICD-9-CM: 401.9 Chronic pain of both knees     ICD-10-CM: M25.561, M25.562, G89.29 ICD-9-CM: 719.46, 338.29 Cirrhosis of liver without ascites, unspecified hepatic cirrhosis type ([facility]     ICD-10-CM: K74.60 ICD-9-CM: 571.5 Encounter for screening     ICD-10-CM: Z13.9 ICD-9-CM: V82.9 Anemia, unspecified type     ICD-10-CM: D64.9 ICD-9-CM: 285.9 Partes vitales PS Pulso Temperatura Napoleon ( percentil de crecimiento) Peso (percentil de crecimiento) BMI ([facility])  
 (!) 210/66 (BP 1 Location: Left arm, BP Patient Position: Sitting) 66 97.7 °F (36.5 °C) (Oral) 5' 1.02\" (1.55 m) 207 lb 12.8 oz (94.3 kg) 39.23 kg/m2 Estado obstétrico Estatus de tabaquísmo Postmenopausal Former Smoker Historial de signos vitales BMI and BSA Data Body Mass Index Body Surface Area  
 39.23 kg/m 2 2.01 m 2 [facility] Pharm[facility] Name Phone [place] [address] [place] [address],First Floor, [address] [facility] AT [place] [phone] Hinojosa lista de medicamentos actualizada Tierney Collins actualizada 5/10/18  1:56 PM.  Daniela lFores use hinojosa lista de medicamentos más reciente. diclofenac 1 % Gel También conocido ofelia:  VOLTAREN Apply 2 g to affected area four (4) times daily. ferrous sulfate 325 mg (65 mg iron) tablet Take 1 Tab by mouth two (2) times a day. For anemia. Joyce 1 tableta dos veces al calvin para anemia  
  
 furosemide 40 mg tablet También conocido ofelia:  LASIX Take 1 Tab by mouth daily. lactulose 20 gram/30 mL Soln solution También conocido ofelia:  [address] Take 15 mL by mouth two (2) times a day. lisinopril 20 mg tablet También conocido ofelia:  Breanna Yuni Take 1 Tab by mouth daily. Nepalese instructions  
  
 spironolactone 100 mg tablet También conocido ofelia:  ALDACTONE Take 1 Tab by mouth daily. traMADol 50 mg tablet También conocido ofelia:  ULTRAM  
Take 0.5 Tabs by mouth every six (6) hours as needed (moderate pain). Max Daily Amount: 100 mg. [place] 1/2 tab por boca cada 6 horas si nesecita para dolor moderado. Impresion de recetas Refills  
 traMADol (ULTRAM) 50 mg tablet 0 Sig: Take 0.5 Tabs by mouth every six (6) hours as needed (moderate pain). Max Daily Amount: 100 mg. [place] 1/2 tab por boca cada 6 horas si nesecita para dolor moderado. Class: Print Route: Oral  
  
Recetas Enviado a la Arnold Refills  
 ferrous sulfate 325 mg (65 mg iron) tablet 2 Sig: Take 1 Tab by mouth two (2) times a day. For anemia. [place] 1 [facility] al calvin para anemia Class: Normal  
 Pharmacy: [address],[facility], [address] [facility] AT [place] [address] [phone] Ph #: [phone] Route: Oral  
 lisinopril (PRINIVIL, ZESTRIL) 20 mg tablet 1 Sig: Take 1 Tab by mouth daily. Tajik instructions Class: Normal  
 Pharmacy: [address],[facility], [address] [facility] AT [place] [address] [phone] Ph #: [phone] Route: Oral  
  
Hicimos lo siguiente AMB POC HEMOGLOBIN (HGB) [95698 CPT(R)] Instrucciones de seguimiento Return in about 4 weeks (around 6/7/2018) for Knee pain, HTN and anemia follow up appt. Introducing [facility]! [facility] introduce portal paciente MyChart . Ahora se puede acceder a partes de tam expediente médico, enviar por correo electrónico la oficina de tam médico y solicitar renovaciones de medicamentos en línea. En tam navegador de Internet , Issac Sheni a [email or web address] Sheeba clic en el usuario por Germania Comber? Gabriela Halon clic aquí en la sesión Loanne . Verá la página de registro Rowan. Ingrese tam código de [facility] magalys y ofelia aparece a continuación.  Usted no tendrá que [employer] código después de sukhdeep completado el proceso de registro . Si usted no se inscribe antes de la fecha de caducidad , debe solicitar un nuevo código. · MyChart Código de acceso : [id number] Expires: 6/7/2018  9:58 AM 
 
Ingresa los últimos cuatro dígitos de tam Número de Seguro Social ( xxxx ) y fecha de nacimiento ( dd / mm / aaaa ) ofelia se indica y sheeba clic en Enviar. Usted será llevado a la siguiente página de registro . Crear un ID MyChart . Esta será tam ID de inicio de sesión de MyChart y no puede ser [place] , por lo que pensar en lam que es M[place]a [place] y fácil de recordar . Crear lam contraseña MyChart . Usted puede cambiar tam contraseña en cualquier momento . Ingrese tam Password Reset de preguntas y [place] . [place] se puede utilizar en un momento posterior si usted olvida tam contraseña. Introduzca tam dirección de correo electrónico . Polo Suni recibirá lam notificación por correo electrónico cuando la nueva información está disponible en MyChart . [place] clic en Registrarse. Vernona Curling víctor y descargar porciones de tam expediente médico. 
Sheeba clic en el enlace de descarga del menú Resumen para descargar lam copia portátil de tam información médica . Si tiene [employer] , por favor visite la sección de preguntas frecuentes del sitio web MyChart . Recuerde, MyChart NO es que se utilizará para las necesidades urgentes. Para emergencias médicas , llame al 911 . Ahora disponible en tam iPhone y Android ! Por favor proporcione tameka resumen de la documentación de cuidado a tam próximo proveedor. Your primary care clinician is listed as Goyo Guess. If you have any questions after today's visit, please call [phone].
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
## 2018-05-10 NOTE — PROGRESS NOTES
3:02 PM Patient had left the clinic without seen DC nurse, this nurse called due to patient had been prescribed Tramadol and needed to  paper prescription. AVS given and reviewed, BP recheck and Dr. Ag Espinosa aware of result, no new orders given. Discussed with patient about a diet low in sodium, to check BP once a week and bring record to clinic next time. Honoring choices packet given and patient looks insterested in completing an advance directive. Will let honoring choices facilitators know. Patient had already made her f/u appt. Patient verbalized understanding and does not have any question or concerns at this time.  Angi Otto, RN
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
## 2018-05-15 ENCOUNTER — PATIENT OUTREACH (OUTPATIENT)
Dept: FAMILY MEDICINE CLINIC | Age: 63
End: 2018-05-15

## 2018-05-15 NOTE — PROGRESS NOTES
5/15/18    NN called Maricarmen Garcias to schedule an appointment for [facility] NN left a message with contact information. Dr. Ana Gleason invited patient.

## 2018-06-05 ENCOUNTER — PATIENT OUTREACH (OUTPATIENT)
Dept: FAMILY MEDICINE CLINIC | Age: 63
End: 2018-06-05

## 2018-06-19 ENCOUNTER — OFFICE VISIT (OUTPATIENT)
Dept: FAMILY MEDICINE CLINIC | Age: 63
End: 2018-06-19

## 2018-06-19 VITALS
DIASTOLIC BLOOD PRESSURE: 62 MMHG | WEIGHT: 215.4 LBS | HEART RATE: 77 BPM | BODY MASS INDEX: 40.67 KG/M2 | TEMPERATURE: 97.9 F | SYSTOLIC BLOOD PRESSURE: 193 MMHG | HEIGHT: 61 IN

## 2018-06-19 DIAGNOSIS — R07.9 EXERTIONAL CHEST PAIN: ICD-10-CM

## 2018-06-19 DIAGNOSIS — M17.0 OSTEOARTHRITIS OF BOTH KNEES, UNSPECIFIED OSTEOARTHRITIS TYPE: ICD-10-CM

## 2018-06-19 DIAGNOSIS — K74.60 HEPATIC CIRRHOSIS, UNSPECIFIED HEPATIC CIRRHOSIS TYPE (HCC): ICD-10-CM

## 2018-06-19 DIAGNOSIS — D64.9 ANEMIA, UNSPECIFIED TYPE: Primary | ICD-10-CM

## 2018-06-19 LAB — HGB BLD-MCNC: 6.6 G/DL
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
## 2018-06-19 NOTE — PROGRESS NOTES
Results for orders placed or performed in visit on 06/19/18   AMB POC HEMOGLOBIN (HGB)   Result Value Ref Range    Hemoglobin (POC) 6.6

## 2018-06-20 ENCOUNTER — TELEPHONE (OUTPATIENT)
Dept: HEMATOLOGY | Age: 63
End: 2018-06-20

## 2018-06-21 ENCOUNTER — TELEPHONE (OUTPATIENT)
Dept: FAMILY MEDICINE CLINIC | Age: 63
End: 2018-06-21

## 2018-06-21 NOTE — TELEPHONE ENCOUNTER
I called to f/u with pt - I sent her to [facility] 2 d ago and no hospital notes or records produced, suspect pt may have alternate record as inpatient    No answer at pt's #, so left vm asking pt call me when able to get update on her status

## 2018-07-18 ENCOUNTER — TELEPHONE (OUTPATIENT)
Dept: FAMILY MEDICINE CLINIC | Age: 63
End: 2018-07-18

## 2018-07-18 NOTE — TELEPHONE ENCOUNTER
Patient, Brady Banuelos, called fo an appointment. I saw from chart that we had been trying to find out her status. Please call her.     Radha Kim April

## 2018-07-18 NOTE — TELEPHONE ENCOUNTER
Pt had returned phone call. Pt didn't go to the hospital per Dr recommendation. She had to take her spouse to the hospital instead. The pt stated she has had to take back for 2 x since then. She stated she is feeling better but still not well. Pt asking for appt. A message ws routed to the provider.  Antony Chavez RN

## 2018-07-20 DIAGNOSIS — D64.9 ANEMIA, UNSPECIFIED TYPE: Primary | ICD-10-CM

## 2018-07-20 NOTE — TELEPHONE ENCOUNTER
I had actually heard pt had not gone to hospital from [facility]  I have placed an order for CBC, would love for pt to come in next week to have that lab checked  If she is overall feeling better and her Hgb is improving, then I'm ok with scheduling her for f/u in the next month  Pt should know to go to the ER if having rectal bleeding, blood in vomit, dizziness, CP, SOB

## 2018-07-20 NOTE — TELEPHONE ENCOUNTER
Call returned to pt. She reports she did NOT go to ER as requested on 6/19/18 as her  was very ill and she took him to hospital instead. She reports she is feeling a bit better but has bad leg cramps. Reviewed reasons seen in provider notes as why she wanted her to go to ER - labs, cxr, ? transfusion needed. Routing to provider Dr Pavan Townsend for review.

## 2018-07-23 NOTE — TELEPHONE ENCOUNTER
----- Message from Edgar Sawyer MD sent at 7/18/2018 10:39 AM EDT -----  Regarding: RE: hospital f/u  Sure, yes pls make appt as soon as we can in the coming 2 weeks if possible as I'm out of town starting 7/28 for a week    Ok to [employer] from AYANNA Julian      ----- Message -----     From: Tiara Alvarado RN     Sent: 7/18/2018   9:37 AM       To: Edgar Sawyer MD  Subject: hospital f/u                                     The pt called the [facility] office this morning. She is asking for an appt. I see her appt on 08/08/18 has been canceled due to you will not be in the office. I asked her if she had surinder to the hospital since you had sent her there in June. Th ept said she did not go to the hospital because she had to take her  to the hospital and he has been several times since then. She said she is feeling better. Her knee's feel better and she has been taking iron supplement and overall feels better. Did you need to see her?

## 2018-07-23 NOTE — TELEPHONE ENCOUNTER
Int # [id number]. Tc was made to the pt. No answer on the phone. A generic message was left for the pt to contact the [facility] office and ask to speak with the nurse.  Yazmin Stacy RN

## 2018-08-02 ENCOUNTER — TELEPHONE (OUTPATIENT)
Dept: FAMILY MEDICINE CLINIC | Age: 63
End: 2018-08-02

## 2018-08-07 ENCOUNTER — OFFICE VISIT (OUTPATIENT)
Dept: FAMILY MEDICINE CLINIC | Age: 63
End: 2018-08-07

## 2018-08-07 VITALS
DIASTOLIC BLOOD PRESSURE: 67 MMHG | HEIGHT: 61 IN | BODY MASS INDEX: 40.02 KG/M2 | SYSTOLIC BLOOD PRESSURE: 180 MMHG | WEIGHT: 212 LBS | TEMPERATURE: 98.5 F | HEART RATE: 65 BPM

## 2018-08-07 DIAGNOSIS — Z13.9 ENCOUNTER FOR SCREENING: ICD-10-CM

## 2018-08-07 DIAGNOSIS — R25.2 BILATERAL LEG CRAMPS: ICD-10-CM

## 2018-08-07 DIAGNOSIS — I10 ESSENTIAL HYPERTENSION: Primary | ICD-10-CM

## 2018-08-07 DIAGNOSIS — R18.8 CIRRHOSIS OF LIVER WITH ASCITES, UNSPECIFIED HEPATIC CIRRHOSIS TYPE (HCC): ICD-10-CM

## 2018-08-07 DIAGNOSIS — K74.60 CIRRHOSIS OF LIVER WITH ASCITES, UNSPECIFIED HEPATIC CIRRHOSIS TYPE (HCC): ICD-10-CM

## 2018-08-07 DIAGNOSIS — D50.8 OTHER IRON DEFICIENCY ANEMIA: ICD-10-CM

## 2018-08-07 DIAGNOSIS — M17.0 OSTEOARTHRITIS OF BOTH KNEES, UNSPECIFIED OSTEOARTHRITIS TYPE: ICD-10-CM

## 2018-08-07 LAB
GLUCOSE POC: NORMAL MG/DL
HGB BLD-MCNC: 11.3 G/DL

## 2018-08-07 RX ORDER — LISINOPRIL 40 MG/1
40 TABLET ORAL DAILY
Qty: 90 TAB | Refills: 3 | Status: SHIPPED | OUTPATIENT
Start: 2018-08-07 | End: 2018-11-20 | Stop reason: SDUPTHER

## 2018-08-07 NOTE — PROGRESS NOTES
Printed AVS. The  Bryna Severe took the AVS to the provider to give to the pt. The pt's letter from the provider was printed and Marichuy Velasco also took that to the provider to sign.  Pradeep Lerner RN

## 2018-08-07 NOTE — PROGRESS NOTES
Coordination of Care  1. Have you been to the ER, urgent care clinic since your last visit? Hospitalized since your last visit? No    2. Have you seen or consulted any other health care providers outside of the [address] [place] [address] since your last visit? Include any pap smears or colon screening. No    Does the patient need refills? NO    Learning Assessment Complete?  yes  Depression Screening complete in the past 12 months? yes     Results for orders placed or performed in visit on 08/07/18   AMB POC GLUCOSE BLOOD, BY GLUCOSE MONITORING DEVICE   Result Value Ref Range    Glucose POC  mg/dL   AMB POC HEMOGLOBIN (HGB)   Result Value Ref Range    Hemoglobin (POC) 11.3

## 2018-08-07 NOTE — MR AVS SNAPSHOT
[address] [place] [address] 
[phone] Patient: Neel Parsons MRN: [id number] YJN:[id number] Visit Information Lizabeth Hernandez Personal Médico Departamento Teléfono del Dep. Número de visita 8/7/2018  2:15 PM Beatriz Che MD [place] [address] [phone] Follow-up Instructions Return for 6-8 weeks for HTN follow up appt. Your Appointments 8/15/2018  2:00 PM  
Follow Up with VIDHYA Galdamez [facility] Appt Note: Follow up; Follow up [address] [facility] [phone] [facility] [address]  
59 [facility] Eliseo [address] Upcoming Health Maintenance Date Due Pneumococcal 19-64 Medium Risk (1 of 1 - PPSV23) 3/5/1974 DTaP/Tdap/Td series (1 - Tdap) 3/5/1976 PAP AKA CERVICAL CYTOLOGY 3/5/1976 BREAST CANCER SCRN MAMMOGRAM 3/5/2005 ZOSTER VACCINE AGE 60> 1/5/2015 Influenza Age 5 to Adult 8/1/2018 FOBT Q 1 YEAR AGE 50-75 8/17/2018 Alergias  Review Complete El: 6/19/2018 Por: Beatriz Che MD  
 A partir del:  8/7/2018 Intensidad Anotado Tipo de reacción [employer] Morphine  03/11/2011    Nausea and Vomiting, Other (comments) Histamine release? Red streak on arm after IV injection Vacunas actuales Revisadas el:  8/20/2017 Debera Nugent Influenza Vaccine (Quad) PF 11/20/2017, 10/20/2016 No revisadas esta visita You Were Diagnosed With   
  
 Jessica Andrew Essential hypertension    -  Primary ICD-10-CM: I10 
ICD-9-CM: 401.9 Other iron deficiency anemia     ICD-10-CM: D50.8 ICD-9-CM: 280.8 Cirrhosis of liver with ascites, unspecified hepatic cirrhosis type ([facility] [address]     ICD-10-CM: K74.60 ICD-9-CM: 571.5 Encounter for screening     ICD-10-CM: Z13.9 ICD-9-CM: V82.9 Partes vitales PS Pulso Temperatura Byron ( percentil de crecimiento) Peso (percentil de crecimiento) BMI (IMC)  
 180/67 (BP 1 Location: Left arm, BP Patient Position: Sitting) 65 98.5 °F (36.9 °C) (Oral) 5' 1\" (1.549 m) 212 lb (96.2 kg) 40.06 kg/m2 Estado obstétrico Estatus de tabaquísmo Postmenopausal Former Smoker Historial de signos vitales BMI and BSA Data Body Mass Index Body Surface Area 40.06 kg/m 2 2.03 m 2 [facility] Name Phone Tiff Burt [address] [facility],First Floor, [address] [facility] AT [place] [phone] Tam lista de medicamentos actualizada Yunior Cuello actualizada 8/7/18  4:40 PM.  Ella Glad use tam lista de medicamentos más reciente. diclofenac 1 % Gel También conocido ofelia:  VOLTAREN Apply 2 g to affected area four (4) times daily. ferrous sulfate 325 mg (65 mg iron) tablet Take 1 Tab by mouth two (2) times a day. For anemia. [place] 1 tableta dos veces al calvin para anemia  
  
 furosemide 40 mg tablet También conocido ofelia:  LASIX Take 1 Tab by mouth daily. lactulose 20 gram/30 mL Soln solution También conocido ofelia:  [address] Take 15 mL by mouth two (2) times a day. lisinopril 40 mg tablet También conocido ofelia:  Ruthie Reasons Take 1 Tab by mouth daily. For blood pressure. [place] 1 tableta lam vez al calvin para presion  
  
 spironolactone 100 mg tablet También conocido ofelia:  ALDACTONE Take 1 Tab by mouth daily. traMADol 50 mg tablet También conocido ofelia:  ULTRAM  
Take 0.5 Tabs by mouth every six (6) hours as needed (moderate pain). Max Daily Amount: 100 mg. [place] 1/2 tab por boca cada 6 horas si nesecita para dolor moderado. Recetas Enviado a la [place] Refills  
 lisinopril (PRINIVIL, ZESTRIL) 40 mg tablet 3 Sig: Take 1 Tab by mouth daily. For blood pressure. [place] 1 tableta lam vez al calvin para presion Class: Normal  
 Pharmacy: Kristin Blizzard [address] [facility], [address] [facility] AT [address] [id number]  #: [phone] Route: Oral  
  
Hicimos lo siguiente AMB POC GLUCOSE BLOOD, BY GLUCOSE MONITORING DEVICE [78608 CPT(R)] AMB POC HEMOGLOBIN (HGB) [16171 CPT(R)] Instrucciones de seguimiento Return for 6-8 weeks for HTN follow up appt. Instrucciones para el Paciente Dieta DASH: Instrucciones de cuidado - [ DASH Diet: Care Instructions ] Instrucciones de cuidado La dieta DASH es un plan de alimentación que puede ayudar a bajar la presión arterial. DASH es la sigla en inglés de \"Dietary Approaches to Stop Hypertension\" (medidas dietéticas para disminuir la hipertensión). Hipertensión significa presión arterial lucas. La dieta DASH se concentra en el consumo de alimentos con alto contenido de calcio, potasio y [place]. Estos nutrientes pueden disminuir la presión arterial. Los alimentos con el mayor contenido de [place] nutrientes son las frutas, las verduras, los productos lácteos de bajo contenido de [place], las nueces, las semillas y las legumbres. Roman monika suplementos de calcio, potasio y magnesio, en lugar de comer alimentos ricos en estos nutrientes, no tiene el mismo efecto. La dieta DASH también incluye granos integrales, pescado y aves. La dieta DASH es tim de los cambios de estilo de clyde que quizá le recomiende tam médico para reducir la presión arterial lucas. Es posible que tam médico también quiera que usted reduzca la cantidad de sodio en tam Estevan Sauger. Reducir el sodio mientras sigue la dieta DASH puede bajar la presión arterial incluso más que únicamente con la dieta DASH. La atención de seguimiento es lam parte clave de tam tratamiento y seguridad. Asegúrese de hacer y acudir a todas las citas, y llame a tam médico si está teniendo problemas.  También es lam buena idea [employer] resultados de justus exámenes y mantener lam lista de los medicamentos que bony. Cómo puede cuidarse en el hogar? Cómo seguir la dieta DASH 
· Coma entre 4 y 5 porciones de fruta al día. Lam porción incluye 1 fruta de [place], ½ taza de fruta enlatada o cortada en trozos, 1/4 taza de fruta seca o 4 onzas (½ taza) de jugo de frutas. Elija fruta con más frecuencia que jugo. · Consuma entre 4 y 11 porciones de verduras al día. Lam porción incluye 1 taza de Cristiane o de verduras de hojas crudas, ½ taza de verduras cocidas o cortadas en trozos, o 4 onzas (½ taza) de jugo de verduras. Elija comer verduras con más frecuencia que monika tam jugo. · Consuma entre 2 y 3 porciones de lácteos semidescremados y descremados al día. Lam porción incluye 8 onzas de [place], 1 taza de yogur o 1½ onzas de [place]. · Coma entre 6 y 6 porciones de granos todos los [address] Lam porción incluye 1 rebanada de pan, 1 onza de cereal seco, o ½ taza de arroz, pasta o cereal cocido. Trate de elegir productos de grano integral con la mayor frecuencia posible. · Limite la cantidad de [place]), aves y pescado a 2 porciones al día. Toshia Conch porción son 3 onzas, lo que es aproximadamente el tamaño de un raji de naipes. · Coma entre 4 y 5 porciones de nueces, semillas y legumbres (frijoles [habichuelas], lentejas y arvejas [chícharos] secos y cocidos) a la semana. Lam porción incluye 1/3 taza de nueces, 2 cucharadas de semillas o ½ taza de frijoles o arvejas cocidos. · [address] y aceites a 2 o 3 porciones al día. Lam porción es 1 cucharadita de aceite vegetal o 2 cucharadas de aderezo para ensaladas. · Limite los dulces y el azúcar adicional a 5 porciones o menos por semana. Toshia Conch porción es 1 cucharada de [place] o [place], ½ taza de sorbete o 1 taza de limonada. · Consuma menos de 2,300 miligramos (mg) de sodio al día. Si limita tam consumo de sodio a 1,500 mg al día, puede reducir tam presión arterial aún más. Consejos para tener éxito · Inicie con cambios pequeños. No intente hacer cambios radicales en tam dieta de manera repentina. Podría sentir que extraña justus comidas favoritas y, por lo [place], sukhdeep lam mayor probabilidad de que no cumpla el plan. Byron Gan y Neymar. Revonda Carlos que esos cambios se conviertan en un hábito, incorpore algunos [employer]. · Pruebe algo de lo siguiente: 
¨ Fíjese el objetivo de comer lam porción de fruta o de verduras en todas las comidas y los refrigerios. [place] facilitará alcanzar la cantidad diaria recomendada de frutas y verduras. ¨ Pruebe comer yogur cubierto con frutas frescas y nueces ofelia refrigerio o ofelia postre saludable. ¨ Agregue josefina, tomate, pepino y cebolla a justus sándwiches. ¨ Combine lam masa de pizza precocida con queso mozzarella de bajo contenido de grasa (\"low-fat\") y cúbralo con abundantes verduras. Intente utilizar tomates, calabaza, espinaca, brócoli, zanahorias, coliflor y cebollas. ¨ Opte por comer lam variedad de verduras cortadas en trozos con un aderezo de bajo contenido de grasa ofelia refrigerio, en lugar de \"chips\" (tipo gómez fritas) y un \"dip\" (producto untable). ¨ [place] semillas de girasol o almendras picadas [employer]. O intente agregar nueces o almendras picadas a las verduras cocidas. ¨ Pruebe algunas comidas vegetarianas con frijoles y arvejas. Pino Magaly o frijoles rojos a las ensaladas. Prepare burritos y tacos con puré de frijoles pintos o negros. Dónde puede encontrar más información en inglés? Tara Saltkristin a http://constance-brian.info/. Saray C324 en la búsqueda para aprender más acerca de \"Dieta DASH: Instrucciones de cuidado - [ DASH Diet: Care Instructions ]. \" 
Revisado: 6 nayelymbre, 2017 Versión del contenido: 11.7 © 0126-1990 [employer].  Las instrucciones de cuidado fueron adaptadas bajo licencia por [employer] (which disclaims liability or warranty for this information). Si usted tiene [place] afección médica o sobre estas instrucciones, siempre pregunte a tam profesional de sreekanth. [facility], Incorporated niega toda garantía o responsabilidad por tam uso de esta información. Calambres nocturnos en las piernas: Instrucciones de cuidado - [ Nighttime Leg Cramps: Care Instructions ] Instrucciones de cuidado Los calambres nocturnos en las piernas suceden cuando un músculo de la pierna se contrae de repente. Lorena sucede más a menudo en la pantorrilla. Roman los calambres en el muslo o el pie también son comunes. Los calambres a menudo se producen mark cuando se está quedando dormido o se despierta. Los calambres en las piernas pueden ser dolorosos. Pueden durar desde unos segundos a algunos minutos. Aunque son comunes, los entendidos no saben exactamente cuál es la causa. Para tratar los calambres musculares, usted puede estirar y masajear el músculo. Si los calambres se repiten, tam médico puede recetarle medicación que relaja los músculos. La atención de seguimiento es lam parte clave de tam tratamiento y seguridad. Asegúrese de hacer y acudir a todas las citas, y llame a tam médico si está teniendo problemas. También es lam buena idea saber los resultados de justus exámenes y mantener lam lista de los medicamentos que bony. Cómo puede cuidarse en el hogar? · Para detener un calambre en la pierna, siéntese y estire la pierna mientras flexiona el pie hacia arriba en dirección a la rodilla. Es posible que le ayude colocar lam toalla enrollada debajo del antepié y, mientras sujeta la toalla de ambos extremos, tire suavemente de la toalla en dirección a usted mientras mantiene la rodilla estirada. Lorena estira los músculos de la pantorrilla. El calambre suele irse después de unos minutos. · [place] lam ducha o un baño de agua tibia para relajar los músculos. Algunas personas encuentran que lam almohadilla térmica colocada sobre el músculo también puede ayudar. Otras experimentan alivio cuando frotan la pantorrilla con lam compresa de hielo. · Estire justus músculos todos los días, en especial antes y después de hacer ejercicio, y a la hora de WEDG[place]ARRUP. Estirarse regularmente puede [employer] músculos y podría prevenir los calambres. · No aumente repentinamente la cantidad de ejercicio que hace. Aumente justus ejercicios poco a poco, cada semana. · Si tam médico le receta un medicamento, tómelo exactamente según las indicaciones. Llame a tam médico si piensa que está teniendo un problema con tam medicamento. · Pregúntele a tam médico si puede monika un analgésico (medicamento para el dolor) de venta izabella, ofelia acetaminofén (Tylenol), ibuprofeno (Advil, Motrin) o naproxeno (Aleve). Sea oneil con los medicamentos. [place] y siga todas las indicaciones de la [place]. · Tania mucho líquido. Si tiene lam enfermedad renal, cardíaca o hepática y tiene que restringir los líquidos, hable con tam médico antes de aumentar la cantidad de líquido que samara. Cuándo debe pedir ayuda? Preste especial atención a los [facility], y asegúrese de comunicarse con tam médico si: 
  · Tiene calambres musculares con frecuencia que no desaparecen después del tratamiento en el hogar.  
  · Los calambres musculares a menudo lo despiertan por las noches.  
  · No mejora ofelia se esperaba. Dónde puede encontrar más información en inglés? Samantha Yañez a http://kirt.info/. Escriba S910 en la búsqueda para aprender más acerca de \"Calambres nocturnos en las piernas: Instrucciones de cuidado - [ Nighttime Leg Cramps: Care Instructions ]. \" 
Revisado: 9 octubre, 2017 Versión del contenido: 11.7 © 8632-1370 [place].  Las instrucciones de cuidado fueron adaptadas bajo licencia por [employer] (which disclaims liability or warranty for this information). Si usted tiene [place] afección médica o sobre estas instrucciones, siempre pregunte a tam profesional de sreekanth. [facility], Incorporated niega toda garantía o responsabilidad por tam uso de esta información. Por favor proporcione tameka resumen de la documentación de cuidado a tam próximo proveedor. Your primary care clinician is listed as Zana Nelson. If you have any questions after today's visit, please call [phone].
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
## 2018-08-07 NOTE — PATIENT INSTRUCTIONS
Dieta DASH: Instrucciones de cuidado - [ DASH Diet: Care Instructions ]  Instrucciones de cuidado    La dieta DASH es un plan de alimentación que puede ayudar a bajar la presión arterial. DASH es la sigla en inglés de \"Dietary Approaches to Stop Hypertension\" (medidas dietéticas para disminuir la hipertensión). Hipertensión significa presión arterial lucas. La dieta DASH se concentra en el consumo de alimentos con alto contenido de calcio, potasio y [place]. Estos nutrientes pueden disminuir la presión arterial. Los alimentos con el mayor contenido de [place] nutrientes son las frutas, las verduras, los productos lácteos de bajo contenido de [place], las nueces, las semillas y las legumbres. Roman monika suplementos de calcio, potasio y magnesio, en lugar de comer alimentos ricos en estos nutrientes, no tiene el mismo efecto. La dieta DASH también incluye granos integrales, pescado y aves. La dieta DASH es tim de los cambios de estilo de clyde que quizá le recomiende tam médico para reducir la presión arterial lucas. Es posible que tam médico también quiera que usted reduzca la cantidad de sodio en tam Mak Members. Reducir el sodio mientras sigue la dieta DASH puede bajar la presión arterial incluso más que únicamente con la dieta DASH. La atención de seguimiento es lam parte clave de tam tratamiento y seguridad. Asegúrese de hacer y acudir a todas las citas, y llame a tam médico si está teniendo problemas. También es lam buena idea saber los resultados de justus exámenes y mantener lam lista de los medicamentos que bony. ¿Cómo puede cuidarse en el hogar? Cómo seguir la dieta DASH  · Coma entre 4 y 5 porciones de fruta al día. Lam porción incluye 1 fruta de [place], ½ taza de fruta enlatada o cortada en trozos, 1/4 taza de fruta seca o 4 onzas (½ taza) de jugo de frutas. Elija fruta con más frecuencia que jugo. · Consuma entre 4 y 11 porciones de verduras al día.  Lam porción incluye 1 taza de [address] verduras de hojas crudas, ½ taza de verduras cocidas o cortadas en trozos, o 4 onzas (½ taza) de jugo de verduras. Elija comer verduras con más frecuencia que monika tam jugo. · Consuma entre 2 y 3 porciones de lácteos semidescremados y descremados al día. Lam porción incluye 8 onzas de [place], 1 taza de yogur o 1½ onzas de [place]. · Coma entre 6 y 6 porciones de granos todos los [address] Lam porción incluye 1 rebanada de pan, 1 onza de cereal seco, o ½ taza de arroz, pasta o cereal cocido. Trate de elegir productos de grano integral con la mayor frecuencia posible. · Limite la cantidad de [facility] ([facility] [place] aves y pescado a 2 porciones al día. Brandon Durant porción son 3 onzas, lo que es aproximadamente el tamaño de un raji de naipes. · Coma entre 4 y 5 porciones de nueces, semillas y legumbres (frijoles [habichuelas], lentejas y arvejas [chícharos] secos y cocidos) a la semana. Lam porción incluye 1/3 taza de nueces, 2 cucharadas de semillas o ½ taza de frijoles o arvejas cocidos. · [address] y aceites a 2 o 3 porciones al día. Lam porción es 1 cucharadita de aceite vegetal o 2 cucharadas de aderezo para ensaladas. · Limite los dulces y el azúcar adicional a 5 porciones o menos por semana. Brandon Durant porción es 1 cucharada de [place] o [place], ½ taza de sorbete o 1 taza de limonada. · Consuma menos de 2,300 miligramos (mg) de sodio al día. Si limita tam consumo de sodio a 1,500 mg al día, puede reducir tam presión arterial aún más. Consejos para tener éxito  · Inicie con cambios pequeños. No intente hacer cambios radicales en tam dieta de manera repentina. Podría sentir que extraña justus comidas favoritas y, por lo [place], sukhdeep lam mayor probabilidad de que no cumpla el plan. Yessenia Jasso y Fabiola. Jose Luis José Miguel que esos cambios se conviertan en un hábito, incorpore algunos [employer]. · Pruebe algo de lo siguiente:  ¨ Fíjese el objetivo de comer lam porción de fruta o de verduras en todas las comidas y los refrigerios.  [place] facilitará alcanzar la cantidad diaria recomendada de frutas y verduras. ¨ Pruebe comer yogur cubierto con frutas frescas y nueces ofelia refrigerio o ofelia postre saludable. ¨ Agregue josefina, tomate, pepino y cebolla a justus sándwiches. ¨ Combine lam masa de pizza precocida con queso mozzarella de bajo contenido de grasa (\"low-fat\") y cúbralo con abundantes verduras. Intente utilizar tomates, calabaza, espinaca, brócoli, zanahorias, coliflor y cebollas. ¨ Opte por comer lam variedad de verduras cortadas en trozos con un aderezo de bajo contenido de grasa ofelia refrigerio, en lugar de \"chips\" (tipo gómez fritas) y un \"dip\" (producto untable). ¨ [place] semillas de girasol o almendras picadas [employer]. O intente agregar nueces o almendras picadas a las verduras cocidas. ¨ Pruebe algunas comidas vegetarianas con frijoles y arvejas. Donley Blowers o frijoles rojos a las ensaladas. Prepare burritos y tacos con puré de frijoles pintos o negros. ¿Dónde puede encontrar más información en inglés? Freddie Rubalcava a http://constance-brian.info/. [place] R314 en la búsqueda para aprender más acerca de \"Dieta DASH: Instrucciones de cuidado - [ DASH Diet: Care Instructions ]. \"  Revisado: 6 nayelymbre, 2017  Versión del contenido: 11.7  © 3211-7940 [employer]. Las instrucciones de cuidado fueron adaptadas bajo licencia por Good Help [employer]ctions (which disclaims liability or warranty for this information). Si usted tiene [place] afección médica o sobre estas instrucciones, siempre pregunte a tam profesional de sreekanth. Healthwise, Incorporated niega toda garantía o responsabilidad por tam uso de esta información. Calambres nocturnos en las piernas: Instrucciones de cuidado - [ Nighttime Leg Cramps: Care Instructions ]  Instrucciones de cuidado    Los calambres nocturnos en las piernas suceden cuando un músculo de la pierna se contrae de repente.  [place] sucede más a menudo en la pantorrilla. Roman los calambres en el muslo o el pie también son comunes. Los calambres a menudo se producen mark cuando se está quedando dormido o se despierta. Los calambres en las piernas pueden ser dolorosos. Pueden durar desde unos segundos a algunos minutos. Aunque son comunes, los entendidos no saben exactamente cuál es la causa. Para tratar los calambres musculares, usted puede estirar y masajear el músculo. Si los calambres se repiten, tam médico puede recetarle medicación que relaja los músculos. La atención de seguimiento es lam parte clave de tam tratamiento y seguridad. Asegúrese de hacer y acudir a todas las citas, y llame a tam médico si está teniendo problemas. También es lam buena idea saber los resultados de justus exámenes y mantener lam lista de los medicamentos que bony. ¿Cómo puede cuidarse en el hogar? · Para detener un calambre en la pierna, siéntese y estire la pierna mientras flexiona el pie hacia arriba en dirección a la rodilla. Es posible que le ayude colocar lam toalla enrollada debajo del antepié y, mientras sujeta la toalla de ambos extremos, tire suavemente de la toalla en dirección a usted mientras mantiene la rodilla estirada. [place] estira los músculos de la pantorrilla. El calambre suele irse después de unos minutos. · [place] lam ducha o un baño de agua tibia para relajar los músculos. Algunas personas encuentran que lam almohadilla térmica colocada sobre el músculo también puede ayudar. Otras experimentan alivio cuando frotan la pantorrilla con lam compresa de hielo. · Estire justus músculos todos los días, en especial antes y después de hacer ejercicio, y a la hora de [place]. Estirarse regularmente puede [employer] músculos y podría prevenir los calambres. · No aumente repentinamente la cantidad de ejercicio que hace. Aumente justus ejercicios poco a poco, cada semana. · Si tam médico le receta un medicamento, tómelo exactamente según las indicaciones.  Llame a tam médico si piensa que está teniendo un problema con tam medicamento. · Pregúntele a tam médico si puede monika un analgésico (medicamento para el dolor) de venta izabella, ofelia acetaminofén (Tylenol), ibuprofeno (Advil, Motrin) o naproxeno (Aleve). Sea oneil con los medicamentos. Keaar y siga todas las indicaciones de la [place]. · Tania mucho líquido. Si tiene lam enfermedad renal, cardíaca o hepática y tiene que restringir los líquidos, hable con tam médico antes de aumentar la cantidad de líquido que samara. ¿Cuándo debe pedir ayuda? Preste especial atención a los [facility], y asegúrese de comunicarse con tam médico si:    · Tiene calambres musculares con frecuencia que no desaparecen después del tratamiento en el hogar.     · Los calambres musculares a menudo lo despiertan por las noches.     · No mejora ofelia se esperaba. ¿Dónde puede encontrar más información en inglés? Clem Pugh a http://constance-brian.info/. Escriba S910 en la búsqueda para aprender más acerca de \"Calambres nocturnos en las piernas: Instrucciones de cuidado - [ Nighttime Leg Cramps: Care Instructions ]. \"  Revisado: 9 octubre, 2017  Versión del contenido: 11.7  © 9815-5362 Healthwise, Incorporated. Las instrucciones de cuidado fueron adaptadas bajo licencia por Good [employer] Connections (which disclaims liability or warranty for this information). Si usted tiene [place] afección médica o sobre estas instrucciones, siempre pregunte a tam profesional de sreekanth. Healthwise, Incorporated niega toda garantía o responsabilidad por tam uso de esta información.
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
## 2018-08-15 ENCOUNTER — OFFICE VISIT (OUTPATIENT)
Dept: HEMATOLOGY | Age: 63
End: 2018-08-15

## 2018-08-15 VITALS
BODY MASS INDEX: 39.87 KG/M2 | TEMPERATURE: 96 F | OXYGEN SATURATION: 96 % | HEART RATE: 66 BPM | SYSTOLIC BLOOD PRESSURE: 182 MMHG | WEIGHT: 211 LBS | DIASTOLIC BLOOD PRESSURE: 61 MMHG

## 2018-08-15 DIAGNOSIS — K74.60 CIRRHOSIS OF LIVER WITH ASCITES, UNSPECIFIED HEPATIC CIRRHOSIS TYPE (HCC): Primary | ICD-10-CM

## 2018-08-15 DIAGNOSIS — R18.8 CIRRHOSIS OF LIVER WITH ASCITES, UNSPECIFIED HEPATIC CIRRHOSIS TYPE (HCC): Primary | ICD-10-CM

## 2018-08-15 NOTE — MR AVS SNAPSHOT
[address] [facility] [place] [id number] [address] [place] [address] 
[phone] Patient: Isaiah Rincon MRN: [id number] Visit Information Yunior Ruvalcaba Personal Médico Departamento Teléfono del Dep. Número de visita 8/15/2018  2:00 PM Queenie Jane, [address] [place] [address] [phone] Follow-up Instructions Return in about 3 months (around 11/15/2018) for Christiana Barrera. Your Appointments 9/20/2018 11:00 AM  
Follow Up with Rick Arzola MD  
[address] ([facility]) Appt Note: F/u 6-8 wks per EP by DLL  
 [address] [place] [address]  
[phone]  
  
   
 [address] [facility] 7 [address]  
  
    
 10/18/2018  1:30 PM  
PROCEDURE with MD María Elena Pat 75 [facility]) Appt Note: EGD  
 [address] [facility] [place] [phone] [facility] [address]  
[address] [place] [address] [facility] [address]  
  
    
 11/14/2018  2:00 PM  
Follow Up with VIDHYA Omer 75 ([facility]) Appt Note: Follow up [address] [facility] [place] [phone] [facility] [address]  
[address] [place] [address] Upcoming Health Maintenance Date Due Pneumococcal 19-64 Medium Risk (1 of 1 - PPSV23) 3/5/1974 DTaP/Tdap/Td series (1 - Tdap) 3/5/1976 PAP AKA CERVICAL CYTOLOGY 3/5/1976 BREAST CANCER SCRN MAMMOGRAM 3/5/2005 ZOSTER VACCINE AGE 60> 1/5/2015 Influenza Age 5 to Adult 8/1/2018 FOBT Q 1 YEAR AGE 50-75 8/17/2018 Alergias  Review Complete El: 8/15/2018 Por: Nadir Gerber A partir del:  8/15/2018 Intensidad Anotado Tipo de reacción [employer] Morphine  03/11/2011    Nausea and Vomiting, Other (comments) Histamine release? Red streak on arm after IV injection Vacunas actuales Revisadas el:  8/20/2017 Dilcia Ice Influenza Vaccine (Quad) PF 11/20/2017, 10/20/2016 No revisadas esta visita You Were Diagnosed With   
  
 Severo Arenas Cirrhosis of liver with ascites, unspecified hepatic cirrhosis type ([facility].)    -  Primary ICD-10-CM: K74.60 ICD-9-CM: 571.5 Partes vitales PS Pulso Temperatura Peso (percentil de crecimiento) SpO2 BMI (IMC)  
 182/61 (BP 1 Location: Left arm, BP Patient Position: Sitting) 66 96 °F (35.6 °C) (Oral) 211 lb (95.7 kg) 96% 39.87 kg/m2 Estado obstétrico Estatus de tabaquísmo Postmenopausal Former Smoker BMI and BSA Data Body Mass Index Body Surface Area  
 39.87 kg/m 2 2.03 m 2 [facility] Name Phone Son Joseph [address] [facility],First Floor, [address] [facility] AT [place] [address] [phone] Hinojosa lista de medicamentos actualizada Jessica Hail actualizada 8/15/18  2:46 PM.  Alverto Gomes use hinojosa lista de medicamentos más reciente. diclofenac 1 % Gel También conocido ofelia:  VOLTAREN Apply 2 g to affected area four (4) times daily. ferrous sulfate 325 mg (65 mg iron) tablet Take 1 Tab by mouth two (2) times a day. For anemia. [place] 1 tableta dos veces al calvin para anemia  
  
 furosemide 40 mg tablet También conocido ofelia:  LASIX Take 1 Tab by mouth daily. lactulose 20 gram/30 mL Soln solution También conocido ofelia:  Kandis Kitten Take 15 mL by mouth two (2) times a day. lisinopril 40 mg tablet También conocido ofelia:  Robertha Roup Take 1 Tab by mouth daily. For blood pressure. [place] 1 tableta lam vez al calvin para presion  
  
 spironolactone 100 mg tablet También conocido ofelia:  ALDACTONE Take 1 Tab by mouth daily. traMADol 50 mg tablet También conocido ofelia:  ULTRAM  
Take 0.5 Tabs by mouth every six (6) hours as needed (moderate pain). Max Daily Amount: 100 mg.  [place] 1/2 tab por boca cada 6 horas si nesecita para dolor moderado. Hicimos lo siguiente AFP WITH AFP-L3% [[id number] Custom] CBC W/O DIFF [55381 CPT(R)] FERRITIN [14511 CPT(R)] HCV RNA BY JOHNATHAN QL,RFLX TO QT [42354 CPT(R)] HEPATIC FUNCTION PANEL (6) [[id number] Custom] IRON PROFILE [id number] CPT(R)] METABOLIC PANEL, BASIC [63330 CPT(R)] PROTHROMBIN TIME + INR [86179 CPT(R)] UPPER GI ENDOSCOPY,DIAGNOSIS [63526 CPT(R)] Comentarios:  
 Schedule with Dr. Peace Olivier Instrucciones de seguimiento Return in about 3 months (around 11/15/2018) for Jeffrey Wells. Por hacer 11/15/2018 Imaging:  US ABD COMP Por favor proporcione tameka resumen de la documentación de cuidado a tam próximo proveedor. Your primary care clinician is listed as Anders Mace. If you have any questions after today's visit, please call [phone].
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
## 2018-08-15 NOTE — PROGRESS NOTES
1. Have you been to the ER, urgent care clinic since your last visit? Hospitalized since your last visit? No    2. Have you seen or consulted any other health care providers outside of the [address] [place] [address] since your last visit? Include any pap smears or colon screening. No   Chief Complaint   Patient presents with    Follow-up     Visit Vitals    /61 (BP 1 Location: Left arm, BP Patient Position: Sitting)    Pulse 66    Temp 96 °F (35.6 °C) (Oral)    Wt 211 lb (95.7 kg)    SpO2 96%    BMI 39.87 kg/m2     PHQ over the last two weeks 8/15/2018   Little interest or pleasure in doing things Not at all   Feeling down, depressed, irritable, or hopeless Not at all   Total Score PHQ 2 0     Learning Assessment 8/15/2018   PRIMARY LEARNER Patient   HIGHEST LEVEL OF EDUCATION - PRIMARY LEARNER  -   BARRIERS PRIMARY LEARNER NONE   CO-LEARNER CAREGIVER No   CO-LEARNER NAME -   Omid Howe -    NEED -   LEARNER PREFERENCE PRIMARY LISTENING   LEARNER [address] -   ANSWERED BY patient   RELATIONSHIP SELF     Abuse Screening Questionnaire 8/15/2018   Do you ever feel afraid of your partner? N   Are you in a relationship with someone who physically or mentally threatens you? N   Is it safe for you to go home?  Ceci Crump

## 2018-08-16 LAB
AFP L3 MFR SERPL: 6.7 % (ref 0–9.9)
AFP SERPL-MCNC: 6 NG/ML (ref 0–8)
ALBUMIN SERPL-MCNC: 3.8 G/DL (ref 3.6–4.8)
ALP SERPL-CCNC: 102 IU/L (ref 39–117)
ALT SERPL-CCNC: 44 IU/L (ref 0–32)
AST SERPL-CCNC: 34 IU/L (ref 0–40)
BILIRUB DIRECT SERPL-MCNC: 0.27 MG/DL (ref 0–0.4)
BILIRUB SERPL-MCNC: 0.9 MG/DL (ref 0–1.2)
BUN SERPL-MCNC: 16 MG/DL (ref 8–27)
BUN/CREAT SERPL: 29 (ref 12–28)
CALCIUM SERPL-MCNC: 9.1 MG/DL (ref 8.7–10.3)
CHLORIDE SERPL-SCNC: 110 MMOL/L (ref 96–106)
CO2 SERPL-SCNC: 23 MMOL/L (ref 20–29)
CREAT SERPL-MCNC: 0.56 MG/DL (ref 0.57–1)
ERYTHROCYTE [DISTWIDTH] IN BLOOD BY AUTOMATED COUNT: 15.8 % (ref 12.3–15.4)
FERRITIN SERPL-MCNC: 56 NG/ML (ref 15–150)
GLUCOSE SERPL-MCNC: 87 MG/DL (ref 65–99)
HCT VFR BLD AUTO: 39.5 % (ref 34–46.6)
HCV RNA SERPL QL NAA+PROBE: NEGATIVE
HGB BLD-MCNC: 12.9 G/DL (ref 11.1–15.9)
INR PPP: 1.1 (ref 0.8–1.2)
IRON SATN MFR SERPL: 67 % (ref 15–55)
IRON SERPL-MCNC: 274 UG/DL (ref 27–139)
MCH RBC QN AUTO: 31.8 PG (ref 26.6–33)
MCHC RBC AUTO-ENTMCNC: 32.7 G/DL (ref 31.5–35.7)
MCV RBC AUTO: 97 FL (ref 79–97)
MORPHOLOGY BLD-IMP: ABNORMAL
PLATELET # BLD AUTO: 54 X10E3/UL (ref 150–379)
POTASSIUM SERPL-SCNC: 4.6 MMOL/L (ref 3.5–5.2)
PROTHROMBIN TIME: 11.4 SEC (ref 9.1–12)
RBC # BLD AUTO: 4.06 X10E6/UL (ref 3.77–5.28)
SODIUM SERPL-SCNC: 145 MMOL/L (ref 134–144)
TIBC SERPL-MCNC: 411 UG/DL (ref 250–450)
UIBC SERPL-MCNC: 137 UG/DL (ref 118–369)
WBC # BLD AUTO: 4.1 X10E3/UL (ref 3.4–10.8)

## 2018-08-17 NOTE — PROGRESS NOTES
Pt notified of negative HCV RNA and normal liver enzymes. This represents a sustained response. Will continue to follow for cirrhosis every 3-4 months, follow-up as scheduled.

## 2018-08-27 ENCOUNTER — TELEPHONE (OUTPATIENT)
Dept: FAMILY MEDICINE CLINIC | Age: 63
End: 2018-08-27

## 2018-08-27 NOTE — TELEPHONE ENCOUNTER
Patient called about some continued headaches that she continues to have, and she would like to speak to doctor or nurse  about whether or not she should continue some medication. Please triage.

## 2018-08-27 NOTE — TELEPHONE ENCOUNTER
Telephone patient to home/cell number no answer, left generic message to return call to [facility] main office.  Madison Howell RN

## 2018-09-10 DIAGNOSIS — M25.562 CHRONIC PAIN OF BOTH KNEES: ICD-10-CM

## 2018-09-10 DIAGNOSIS — G89.29 CHRONIC PAIN OF BOTH KNEES: ICD-10-CM

## 2018-09-10 DIAGNOSIS — M25.561 CHRONIC PAIN OF BOTH KNEES: ICD-10-CM

## 2018-09-10 RX ORDER — TRAMADOL HYDROCHLORIDE 50 MG/1
25 TABLET ORAL
Qty: 30 TAB | Refills: 0 | OUTPATIENT
Start: 2018-09-10 | End: 2018-11-20 | Stop reason: SDUPTHER

## 2018-09-10 RX ORDER — MECLIZINE HCL 12.5 MG 12.5 MG/1
12.5 TABLET ORAL
Qty: 30 TAB | Refills: 2 | Status: SHIPPED | OUTPATIENT
Start: 2018-09-10 | End: 2019-06-18

## 2018-09-10 NOTE — PROGRESS NOTES
I phoned in tramadol rx to [address] in [place] (pt's preferred pharmacy)   reviewed and appropriate  Pharmacist confirmed pt had goodrx on file and would apply

## 2018-09-10 NOTE — TELEPHONE ENCOUNTER
I called patient and discussed her sxs    She says she is no longer having headaches, but has had ongoing dizziness that seems more persistent. She experiences the dizziness, like imbalance and sometimes room spinning, about 2-3 times weekly. She has fallen about 2 times per month for the last few months. No head trauma. Dizziness is worse with walking, not with standing up or turning head. Denies nausea or dyspnea. + exertional CP at times. On questioning, she says she has had a cardiac stress test and echo in the past few yrs (but I do not see in EMR). On chart review, last Hgb 8/15/18 was 12.9    Pt also c/o worsening leg cramps, tylenol is not helping. Cannot recall if tramadol helped in the past (last rx May 2018), is willing to try. I will send rx for meclizine and tramadol prn for dizziness and pain. I have upcoming appt on 9/20/18 and we will discuss further testing and w/u needed then. I advised her to go to the ER if she experiences unremitting CP, SOB, sweating, dizziness. She verbalized understanding. No further needs for nursing to call at this time.

## 2018-09-20 ENCOUNTER — OFFICE VISIT (OUTPATIENT)
Dept: FAMILY MEDICINE CLINIC | Age: 63
End: 2018-09-20

## 2018-09-20 ENCOUNTER — HOSPITAL ENCOUNTER (OUTPATIENT)
Dept: LAB | Age: 63
Discharge: HOME | End: 2018-09-20

## 2018-09-20 ENCOUNTER — TELEPHONE (OUTPATIENT)
Dept: FAMILY MEDICINE CLINIC | Age: 63
End: 2018-09-20

## 2018-09-20 VITALS
HEART RATE: 68 BPM | DIASTOLIC BLOOD PRESSURE: 89 MMHG | TEMPERATURE: 97.8 F | WEIGHT: 207 LBS | BODY MASS INDEX: 39.11 KG/M2 | SYSTOLIC BLOOD PRESSURE: 205 MMHG

## 2018-09-20 DIAGNOSIS — I10 ESSENTIAL HYPERTENSION: Primary | ICD-10-CM

## 2018-09-20 DIAGNOSIS — R07.9 CHEST PAIN, UNSPECIFIED TYPE: ICD-10-CM

## 2018-09-20 DIAGNOSIS — K74.60 CIRRHOSIS OF LIVER WITHOUT ASCITES, UNSPECIFIED HEPATIC CIRRHOSIS TYPE (HCC): ICD-10-CM

## 2018-09-20 DIAGNOSIS — Z13.9 ENCOUNTER FOR SCREENING: ICD-10-CM

## 2018-09-20 DIAGNOSIS — M79.672 PAIN IN BOTH FEET: ICD-10-CM

## 2018-09-20 DIAGNOSIS — M79.671 PAIN IN BOTH FEET: ICD-10-CM

## 2018-09-20 LAB
ANION GAP SERPL CALC-SCNC: 6 MMOL/L (ref 5–15)
BASOPHILS # BLD: 0 K/UL (ref 0–0.1)
BASOPHILS NFR BLD: 0 % (ref 0–1)
BUN SERPL-MCNC: 13 MG/DL (ref 6–20)
BUN/CREAT SERPL: 25 (ref 12–20)
CALCIUM SERPL-MCNC: 8.2 MG/DL (ref 8.5–10.1)
CHLORIDE SERPL-SCNC: 111 MMOL/L (ref 97–108)
CO2 SERPL-SCNC: 26 MMOL/L (ref 21–32)
CREAT SERPL-MCNC: 0.53 MG/DL (ref 0.55–1.02)
DIFFERENTIAL METHOD BLD: ABNORMAL
EOSINOPHIL # BLD: 0 K/UL (ref 0–0.4)
EOSINOPHIL NFR BLD: 1 % (ref 0–7)
ERYTHROCYTE [DISTWIDTH] IN BLOOD BY AUTOMATED COUNT: 15.1 % (ref 11.5–14.5)
GLUCOSE SERPL-MCNC: 95 MG/DL (ref 65–100)
HCT VFR BLD AUTO: 43.3 % (ref 35–47)
HGB BLD-MCNC: 13.9 G/DL
HGB BLD-MCNC: 13.9 G/DL (ref 11.5–16)
IMM GRANULOCYTES # BLD: 0 K/UL (ref 0–0.04)
IMM GRANULOCYTES NFR BLD AUTO: 0 % (ref 0–0.5)
LYMPHOCYTES # BLD: 0.6 K/UL (ref 0.8–3.5)
LYMPHOCYTES NFR BLD: 16 % (ref 12–49)
MCH RBC QN AUTO: 33.3 PG (ref 26–34)
MCHC RBC AUTO-ENTMCNC: 32.1 G/DL (ref 30–36.5)
MCV RBC AUTO: 103.8 FL (ref 80–99)
MONOCYTES # BLD: 0.2 K/UL (ref 0–1)
MONOCYTES NFR BLD: 6 % (ref 5–13)
NEUTS SEG # BLD: 3 K/UL (ref 1.8–8)
NEUTS SEG NFR BLD: 77 % (ref 32–75)
NRBC # BLD: 0 K/UL (ref 0–0.01)
NRBC BLD-RTO: 0 PER 100 WBC
PLATELET # BLD AUTO: 53 K/UL (ref 150–400)
PMV BLD AUTO: 12.4 FL (ref 8.9–12.9)
POTASSIUM SERPL-SCNC: 4.4 MMOL/L (ref 3.5–5.1)
RBC # BLD AUTO: 4.17 M/UL (ref 3.8–5.2)
RBC MORPH BLD: ABNORMAL
SODIUM SERPL-SCNC: 143 MMOL/L (ref 136–145)
WBC # BLD AUTO: 3.8 K/UL (ref 3.6–11)

## 2018-09-20 PROCEDURE — 80048 BASIC METABOLIC PNL TOTAL CA: CPT | Performed by: FAMILY MEDICINE

## 2018-09-20 PROCEDURE — 85025 COMPLETE CBC W/AUTO DIFF WBC: CPT | Performed by: FAMILY MEDICINE

## 2018-09-20 PROCEDURE — 82306 VITAMIN D 25 HYDROXY: CPT | Performed by: FAMILY MEDICINE

## 2018-09-20 PROCEDURE — 83735 ASSAY OF MAGNESIUM: CPT | Performed by: FAMILY MEDICINE

## 2018-09-20 RX ORDER — PROPRANOLOL HYDROCHLORIDE 80 MG/1
40 TABLET ORAL 2 TIMES DAILY
Qty: 60 TAB | Refills: 5 | Status: SHIPPED | OUTPATIENT
Start: 2018-09-20 | End: 2018-11-20 | Stop reason: SDUPTHER

## 2018-09-20 RX ORDER — GABAPENTIN 100 MG/1
100 CAPSULE ORAL 3 TIMES DAILY
Qty: 90 CAP | Refills: 5 | Status: SHIPPED | OUTPATIENT
Start: 2018-09-20 | End: 2019-01-31 | Stop reason: SDUPTHER

## 2018-09-20 NOTE — TELEPHONE ENCOUNTER
The pt was here in clinic today. Saw provider. The pt in discharge declined waiting to have order put in and have stress test scheduled today in clinic . Will  send a message to [facility] to schedule this test for the pt. The pt would like the test scheduled at [facility].  Will try later to schedule this for the pt while here in clinic and call her with the dtae and time of the stress test.  Lanny Canela RN

## 2018-09-20 NOTE — TELEPHONE ENCOUNTER
Pt scheduled for stress test at [facility] [place] on 10/02/18 at 1:30 pm. The pt will need to be at the outpatient registration 30 min early. The pt needs to be called to be told of this appt if not able to contact by the day before appt  Please call central Scheduling and cancel the appt. for the Stress test.  Minerva Ramirez RN

## 2018-09-20 NOTE — LETTER
9/26/2018 2:19 PM 
 
Ms. Alisha Marvin [address] [facility] [address] Dear Ms. Nova Sands, Por favor llame a la oficina: [phone]. Your appointment for: Stress test appointment: 10/2/18 @ 1:00 @ [facility] \"outpatient registration\". Medical office building [place].   
 
The [facility] clinic 
 
Jaime Campos RN

## 2018-09-20 NOTE — PROGRESS NOTES
Coordination of Care  1. Have you been to the ER, urgent care clinic since your last visit? Hospitalized since your last visit? No    2. Have you seen or consulted any other health care providers outside of the [address] [place] [address] since your last visit? Include any pap smears or colon screening. No    Does the patient need refills? NO    Learning Assessment Complete?  yes  Depression Screening complete in the past 12 months? yes     Results for orders placed or performed in visit on 09/20/18   AMB POC HEMOGLOBIN (HGB)   Result Value Ref Range    Hemoglobin (POC) 13.9

## 2018-09-20 NOTE — PROGRESS NOTES
Lazarus Lightning was the . Printed AVS, provided to pt and reviewed. Pt indicated understanding and had no questions. Told pt that rx's have been sent to pharmacy and they should be ready for  in approximately 2 hrs. The pt medication's were reviewed with her the ones that were ordered today. The pt wanted to schedule appt for the Stress test at a later date. A message was sent to [facility] box. Pt was suggested, per provider, to get the [employer] which is $36 annually to receive an overall discount on the medication's. Vikas Blankenship RN    The pt was called to inform her about the Stress test that was scheduled at [facility] [place] on 10/02/18 at 1:30 pm for her. No answer on phone. A message was left to contact the [facility] office.  Vikas Blankenship RN
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
## 2018-09-21 DIAGNOSIS — E83.51 HYPOCALCEMIA: Primary | ICD-10-CM

## 2018-09-21 NOTE — PROGRESS NOTES
CBC - stable hgb 13.9 and PLTs 53  CMP - kidney function normal, but calcium low as it was in earlier part of the year  ** Will see if I can add on Magnesium and vit D to these labs to r/o etiology of hypocalcemia - pt is from [place] and may be hard for her to come back for labs

## 2018-09-22 LAB
25(OH)D3 SERPL-MCNC: 16.3 NG/ML (ref 30–100)
MAGNESIUM SERPL-MCNC: 2.4 MG/DL (ref 1.6–2.4)

## 2018-09-24 DIAGNOSIS — E83.51 HYPOCALCEMIA: Primary | ICD-10-CM

## 2018-09-24 DIAGNOSIS — E55.9 VITAMIN D DEFICIENCY: ICD-10-CM

## 2018-09-24 RX ORDER — ERGOCALCIFEROL 1.25 MG/1
50000 CAPSULE ORAL
Qty: 8 CAP | Refills: 0 | Status: SHIPPED | OUTPATIENT
Start: 2018-09-24 | End: 2018-11-13

## 2018-09-24 NOTE — PROGRESS NOTES
Nurse to please call and inform patient of the following (** represent recommendations for patient) - thank you:  Vitamin D level - low < 20  ** I recommend vitamin D2 50,000u weekly x 8 weeks (rx sent).  Pt will need repeat lab at end of 8 weeks - can do on 11/15 appt  CBC - stable hgb 13.9 and PLTs 53  CMP - kidney function normal, but calcium low, possible 2/2 vit D deficiency, will monitor  Magnesium normal

## 2018-09-26 NOTE — TELEPHONE ENCOUNTER
I called pt today with [employer]  # [id number] and left a generic message asking the pt to call back to speak with a nurse to get a message from the provider. Aazm Farfan RN    I also mailed pt a letter to try to get her to call us and typed out appointment for stress test information in the letter.  Azam Farfan RN

## 2018-10-04 NOTE — TELEPHONE ENCOUNTER
Pt has 11/15 return appointment with Dr. Milad Almaraz. It looks like she did not return phone call nor did she respond to mailed letter to get her ECHO stress test done that we had scheduled for her on 10/2/18. I tried to call pt again today and left another message for her to call clinic.  Francisca Arias RN

## 2018-10-04 NOTE — PROGRESS NOTES
I called pt's spouse on [address] paperwork today with [employer]  # [phone] and gave message from provider.  Kat Lo RN

## 2018-11-20 ENCOUNTER — OFFICE VISIT (OUTPATIENT)
Dept: FAMILY MEDICINE CLINIC | Age: 63
End: 2018-11-20

## 2018-11-20 VITALS
HEIGHT: 61 IN | SYSTOLIC BLOOD PRESSURE: 185 MMHG | DIASTOLIC BLOOD PRESSURE: 66 MMHG | BODY MASS INDEX: 37.76 KG/M2 | WEIGHT: 200 LBS | HEART RATE: 74 BPM | TEMPERATURE: 97.8 F

## 2018-11-20 DIAGNOSIS — I10 ESSENTIAL HYPERTENSION: ICD-10-CM

## 2018-11-20 DIAGNOSIS — K76.6 PORTAL HYPERTENSION (HCC): ICD-10-CM

## 2018-11-20 DIAGNOSIS — M25.561 CHRONIC PAIN OF BOTH KNEES: ICD-10-CM

## 2018-11-20 DIAGNOSIS — K74.60 CIRRHOSIS OF LIVER WITHOUT ASCITES, UNSPECIFIED HEPATIC CIRRHOSIS TYPE (HCC): ICD-10-CM

## 2018-11-20 DIAGNOSIS — G89.29 CHRONIC PAIN OF BOTH KNEES: ICD-10-CM

## 2018-11-20 DIAGNOSIS — M25.562 CHRONIC PAIN OF BOTH KNEES: ICD-10-CM

## 2018-11-20 DIAGNOSIS — B18.2 CHRONIC HEPATITIS C WITHOUT HEPATIC COMA (HCC): ICD-10-CM

## 2018-11-20 RX ORDER — SPIRONOLACTONE 100 MG/1
100 TABLET, FILM COATED ORAL DAILY
Qty: 90 TAB | Refills: 3 | Status: SHIPPED | OUTPATIENT
Start: 2018-11-20 | End: 2019-01-31 | Stop reason: SDUPTHER

## 2018-11-20 RX ORDER — FUROSEMIDE 40 MG/1
40 TABLET ORAL DAILY
Qty: 90 TAB | Refills: 3 | Status: SHIPPED | OUTPATIENT
Start: 2018-11-20 | End: 2019-01-31 | Stop reason: SDUPTHER

## 2018-11-20 RX ORDER — TRAMADOL HYDROCHLORIDE 50 MG/1
25 TABLET ORAL
Qty: 60 TAB | Refills: 1 | Status: SHIPPED | OUTPATIENT
Start: 2018-11-20 | End: 2019-01-31 | Stop reason: SDUPTHER

## 2018-11-20 RX ORDER — PROPRANOLOL HYDROCHLORIDE 80 MG/1
40 TABLET ORAL 2 TIMES DAILY
Qty: 60 TAB | Refills: 5 | Status: SHIPPED | OUTPATIENT
Start: 2018-11-20 | End: 2019-01-31 | Stop reason: SDUPTHER

## 2018-11-20 RX ORDER — LISINOPRIL 40 MG/1
40 TABLET ORAL DAILY
Qty: 90 TAB | Refills: 3 | Status: SHIPPED | OUTPATIENT
Start: 2018-11-20 | End: 2019-01-31 | Stop reason: SDUPTHER
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
## 2018-11-20 NOTE — PROGRESS NOTES
Printed AVS, provided to pt and reviewed. Pt indicated understanding and had no questions. Told pt that rx's have been sent to pharmacy and they should be ready for  in approximately 2 hrs. Pt told to please present GoodRx. com coupon which we provide to your pharmacy to receive discounted price. The pt stated her [facility] did not take the good rx coupons. The pt stated she has to pay over $100 every time. The pt's Pharmacy was called. Lisa Zuniga at the [facility] on [address] was spoken to regarding the goodrx coupons and if they took them because the pt had stated they did not take them when she presented them to them before. Lisa Zuniga in the Pharmacy stated they did honor the good rx coupons. The Pharmacist asked if the pt could bring in 4 coupons she was told there would be 5 because the pt had one printed rx.  Elizabeth Valderrama RN

## 2018-11-30 NOTE — TELEPHONE ENCOUNTER
Int # [id number]. Tc to the pt regarding the providers message for PRN Tramadol. No answer on the phone. Left a message for the pt to return the call to the [facility] office and ask to speak with the nurse.  Delmi Moore RN Spontaneous, unlabored and symmetrical

## 2018-12-24 PROBLEM — M25.562 CHRONIC PAIN OF BOTH KNEES: Status: ACTIVE | Noted: 2018-12-24

## 2018-12-24 PROBLEM — G89.29 CHRONIC PAIN OF BOTH KNEES: Status: ACTIVE | Noted: 2018-12-24

## 2018-12-24 PROBLEM — M25.561 CHRONIC PAIN OF BOTH KNEES: Status: ACTIVE | Noted: 2018-12-24

## 2019-01-03 ENCOUNTER — OFFICE VISIT (OUTPATIENT)
Dept: FAMILY MEDICINE CLINIC | Age: 64
End: 2019-01-03

## 2019-01-03 DIAGNOSIS — Z23 ENCOUNTER FOR IMMUNIZATION: ICD-10-CM

## 2019-01-03 DIAGNOSIS — Z71.89 COUNSELING AND COORDINATION OF CARE: Primary | ICD-10-CM

## 2019-01-03 NOTE — PROGRESS NOTES
[facility] Peña Hansen LPN. Patient denied fever and tolerated vaccine well. Patient verbalized understanding of possible side effects from vaccine and when to seek emergency medical treatment. [facility] information sheet given to patient. Patient had no adverse reaction at time of discharge.  Robby Marcelino RN

## 2019-01-03 NOTE — PROGRESS NOTES
Patient's  is going through immigration process and she wants to know if she can apply for Medicaid for herself. I explained that she needs to ask their  if that is something they can do. Iris Haro her information on [employer], [place] and Carlos Barillas.

## 2019-01-31 ENCOUNTER — OFFICE VISIT (OUTPATIENT)
Dept: FAMILY MEDICINE CLINIC | Age: 64
End: 2019-01-31

## 2019-01-31 ENCOUNTER — HOSPITAL ENCOUNTER (OUTPATIENT)
Dept: LAB | Age: 64
Discharge: HOME | End: 2019-01-31

## 2019-01-31 VITALS
WEIGHT: 213.8 LBS | HEART RATE: 67 BPM | TEMPERATURE: 98.1 F | BODY MASS INDEX: 40.37 KG/M2 | DIASTOLIC BLOOD PRESSURE: 77 MMHG | SYSTOLIC BLOOD PRESSURE: 194 MMHG | HEIGHT: 61 IN

## 2019-01-31 DIAGNOSIS — E55.9 VITAMIN D DEFICIENCY: ICD-10-CM

## 2019-01-31 DIAGNOSIS — K74.60 CIRRHOSIS OF LIVER WITHOUT ASCITES, UNSPECIFIED HEPATIC CIRRHOSIS TYPE (HCC): ICD-10-CM

## 2019-01-31 DIAGNOSIS — M25.561 CHRONIC PAIN OF BOTH KNEES: ICD-10-CM

## 2019-01-31 DIAGNOSIS — K76.6 PORTAL HYPERTENSION (HCC): ICD-10-CM

## 2019-01-31 DIAGNOSIS — M79.671 PAIN IN BOTH FEET: ICD-10-CM

## 2019-01-31 DIAGNOSIS — M79.672 PAIN IN BOTH FEET: ICD-10-CM

## 2019-01-31 DIAGNOSIS — B18.2 CHRONIC HEPATITIS C WITHOUT HEPATIC COMA (HCC): ICD-10-CM

## 2019-01-31 DIAGNOSIS — E83.51 HYPOCALCEMIA: ICD-10-CM

## 2019-01-31 DIAGNOSIS — M25.562 CHRONIC PAIN OF BOTH KNEES: ICD-10-CM

## 2019-01-31 DIAGNOSIS — M25.50 ARTHRALGIA, UNSPECIFIED JOINT: ICD-10-CM

## 2019-01-31 DIAGNOSIS — G89.29 CHRONIC PAIN OF BOTH KNEES: ICD-10-CM

## 2019-01-31 DIAGNOSIS — I10 ESSENTIAL HYPERTENSION: ICD-10-CM

## 2019-01-31 LAB
25(OH)D3 SERPL-MCNC: 39 NG/ML (ref 30–100)
ANION GAP SERPL CALC-SCNC: 8 MMOL/L (ref 5–15)
BASOPHILS # BLD: 0 K/UL (ref 0–0.1)
BASOPHILS NFR BLD: 0 % (ref 0–1)
BUN SERPL-MCNC: 13 MG/DL (ref 6–20)
BUN/CREAT SERPL: 28 (ref 12–20)
CALCIUM SERPL-MCNC: 8.7 MG/DL (ref 8.5–10.1)
CHLORIDE SERPL-SCNC: 113 MMOL/L (ref 97–108)
CO2 SERPL-SCNC: 24 MMOL/L (ref 21–32)
CREAT SERPL-MCNC: 0.46 MG/DL (ref 0.55–1.02)
DIFFERENTIAL METHOD BLD: ABNORMAL
EOSINOPHIL # BLD: 0 K/UL (ref 0–0.4)
EOSINOPHIL NFR BLD: 1 % (ref 0–7)
ERYTHROCYTE [DISTWIDTH] IN BLOOD BY AUTOMATED COUNT: 15.5 % (ref 11.5–14.5)
GLUCOSE SERPL-MCNC: 92 MG/DL (ref 65–100)
HCT VFR BLD AUTO: 41.3 % (ref 35–47)
HGB BLD-MCNC: 13.1 G/DL (ref 11.5–16)
IMM GRANULOCYTES # BLD AUTO: 0 K/UL (ref 0–0.04)
IMM GRANULOCYTES NFR BLD AUTO: 0 % (ref 0–0.5)
LYMPHOCYTES # BLD: 0.8 K/UL (ref 0.8–3.5)
LYMPHOCYTES NFR BLD: 21 % (ref 12–49)
MCH RBC QN AUTO: 34.2 PG (ref 26–34)
MCHC RBC AUTO-ENTMCNC: 31.7 G/DL (ref 30–36.5)
MCV RBC AUTO: 107.8 FL (ref 80–99)
MONOCYTES # BLD: 0.3 K/UL (ref 0–1)
MONOCYTES NFR BLD: 8 % (ref 5–13)
NEUTS SEG # BLD: 2.8 K/UL (ref 1.8–8)
NEUTS SEG NFR BLD: 70 % (ref 32–75)
NRBC # BLD: 0 K/UL (ref 0–0.01)
NRBC BLD-RTO: 0 PER 100 WBC
PLATELET # BLD AUTO: 53 K/UL (ref 150–400)
PMV BLD AUTO: 12.5 FL (ref 8.9–12.9)
POTASSIUM SERPL-SCNC: 3.9 MMOL/L (ref 3.5–5.1)
RBC # BLD AUTO: 3.83 M/UL (ref 3.8–5.2)
RBC MORPH BLD: ABNORMAL
SODIUM SERPL-SCNC: 145 MMOL/L (ref 136–145)
WBC # BLD AUTO: 3.9 K/UL (ref 3.6–11)

## 2019-01-31 PROCEDURE — 85025 COMPLETE CBC W/AUTO DIFF WBC: CPT

## 2019-01-31 PROCEDURE — 82306 VITAMIN D 25 HYDROXY: CPT

## 2019-01-31 PROCEDURE — 80048 BASIC METABOLIC PNL TOTAL CA: CPT

## 2019-01-31 RX ORDER — SPIRONOLACTONE 100 MG/1
100 TABLET, FILM COATED ORAL DAILY
Qty: 30 TAB | Refills: 11 | Status: SHIPPED | OUTPATIENT
Start: 2019-01-31 | End: 2019-06-18 | Stop reason: SDUPTHER

## 2019-01-31 RX ORDER — TRAMADOL HYDROCHLORIDE 50 MG/1
TABLET ORAL
Qty: 60 TAB | Refills: 1 | Status: SHIPPED | OUTPATIENT
Start: 2019-01-31 | End: 2019-06-18 | Stop reason: SDUPTHER

## 2019-01-31 RX ORDER — DICLOFENAC SODIUM 10 MG/G
2 GEL TOPICAL 4 TIMES DAILY
Qty: 100 G | Refills: 3 | Status: SHIPPED | OUTPATIENT
Start: 2019-01-31 | End: 2020-06-01 | Stop reason: DRUGHIGH

## 2019-01-31 RX ORDER — LACTULOSE 10 G/15ML
10 SOLUTION ORAL 2 TIMES DAILY
Qty: 1000 ML | Refills: 11 | Status: SHIPPED | OUTPATIENT
Start: 2019-01-31 | End: 2019-06-18

## 2019-01-31 RX ORDER — FUROSEMIDE 40 MG/1
40 TABLET ORAL DAILY
Qty: 30 TAB | Refills: 11 | Status: SHIPPED | OUTPATIENT
Start: 2019-01-31 | End: 2019-06-18 | Stop reason: SDUPTHER

## 2019-01-31 RX ORDER — LISINOPRIL 40 MG/1
40 TABLET ORAL DAILY
Qty: 30 TAB | Refills: 11 | Status: SHIPPED | OUTPATIENT
Start: 2019-01-31 | End: 2019-06-18 | Stop reason: SDUPTHER

## 2019-01-31 RX ORDER — GABAPENTIN 100 MG/1
100 CAPSULE ORAL 3 TIMES DAILY
Qty: 90 CAP | Refills: 11 | Status: SHIPPED | OUTPATIENT
Start: 2019-01-31 | End: 2019-06-18 | Stop reason: SDUPTHER

## 2019-01-31 RX ORDER — PROPRANOLOL HYDROCHLORIDE 80 MG/1
40 TABLET ORAL 2 TIMES DAILY
Qty: 30 TAB | Refills: 11 | Status: SHIPPED | OUTPATIENT
Start: 2019-01-31 | End: 2019-06-18 | Stop reason: SDUPTHER

## 2019-01-31 NOTE — PROGRESS NOTES
Coordination of Care  1. Have you been to the ER, urgent care clinic since your last visit? Hospitalized since your last visit? No    2. Have you seen or consulted any other health care providers outside of the [address] [place] [address] since your last visit? Include any pap smears or colon screening. no    Does the patient need refills? YES    Learning Assessment Complete?  yes
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
## 2019-01-31 NOTE — PROGRESS NOTES
Patient seen for discharge with assistance from [facility]. We reviewed the AVS, prescriptions, pharmacy location and the walk-in process for xrays. The patient was given the [address] [facility] centers list and understands to ask for a Care Card application when she has the xrays done. She also understands that she will likely receive a bill for the xrays prior to receiving an answer to her Care Card application. If this happens, the patient understands to call the phone number on the bill to let them know that she has applied for the Care Card program. The patient has been to the lab today. Francisco Bose escorted the patient to registration to schedule a 4 week overbook appointment with Dr. Kostas Rice per her instruction.  Ti Grover RN

## 2019-02-01 NOTE — PROGRESS NOTES
Vit D level normal, continue daily supplementation  CBC - hgb normal and stable > 13, no need to resume iron therapy for now as she has been off iron for some months now  BMP - normal renal function, electrolytes  Will send letter to inform pt of results

## 2019-02-17 ENCOUNTER — HOSPITAL ENCOUNTER (EMERGENCY)
Age: 64
Discharge: HOME | End: 2019-02-17
Attending: EMERGENCY MEDICINE
Payer: SUBSIDIZED

## 2019-02-17 ENCOUNTER — APPOINTMENT (OUTPATIENT)
Dept: GENERAL RADIOLOGY | Age: 64
End: 2019-02-17
Attending: EMERGENCY MEDICINE
Payer: SUBSIDIZED

## 2019-02-17 VITALS
DIASTOLIC BLOOD PRESSURE: 87 MMHG | HEIGHT: 60 IN | TEMPERATURE: 98.8 F | SYSTOLIC BLOOD PRESSURE: 192 MMHG | BODY MASS INDEX: 40.44 KG/M2 | HEART RATE: 76 BPM | OXYGEN SATURATION: 97 % | RESPIRATION RATE: 18 BRPM | WEIGHT: 206 LBS

## 2019-02-17 DIAGNOSIS — M25.562 CHRONIC PAIN OF BOTH KNEES: ICD-10-CM

## 2019-02-17 DIAGNOSIS — M19.90 ARTHRITIS: Primary | ICD-10-CM

## 2019-02-17 DIAGNOSIS — M25.561 CHRONIC PAIN OF BOTH KNEES: ICD-10-CM

## 2019-02-17 DIAGNOSIS — G89.29 CHRONIC PAIN OF BOTH KNEES: ICD-10-CM

## 2019-02-17 DIAGNOSIS — M25.50 ARTHRALGIA, UNSPECIFIED JOINT: ICD-10-CM

## 2019-02-17 PROCEDURE — 74011250636 HC RX REV CODE- 250/636: Performed by: EMERGENCY MEDICINE

## 2019-02-17 PROCEDURE — 72170 X-RAY EXAM OF PELVIS: CPT

## 2019-02-17 PROCEDURE — 99282 EMERGENCY DEPT VISIT SF MDM: CPT

## 2019-02-17 PROCEDURE — 96372 THER/PROPH/DIAG INJ SC/IM: CPT

## 2019-02-17 PROCEDURE — 72100 X-RAY EXAM L-S SPINE 2/3 VWS: CPT

## 2019-02-17 RX ORDER — PREDNISONE 20 MG/1
60 TABLET ORAL DAILY
Qty: 15 TAB | Refills: 0 | Status: SHIPPED | OUTPATIENT
Start: 2019-02-17 | End: 2019-02-22

## 2019-02-17 RX ORDER — KETOROLAC TROMETHAMINE 30 MG/ML
30 INJECTION, SOLUTION INTRAMUSCULAR; INTRAVENOUS
Status: COMPLETED | OUTPATIENT
Start: 2019-02-17 | End: 2019-02-17

## 2019-02-17 RX ADMIN — KETOROLAC TROMETHAMINE 30 MG: 30 INJECTION, SOLUTION INTRAMUSCULAR; INTRAVENOUS at 16:04

## 2019-02-17 NOTE — ED PROVIDER NOTES
61 y.o. female with past medical history significant for neurological disorder, liver disease and arthritis who presents from home via personal vehicle with chief complaint of arthralgias. Pt states she has arthritis and has been having body aches for the past 2 weeks. Pt states she is having trouble ambulating. Pt states her left leg is more painful than the right side. Pt states her left leg is shorter than her right. Pt denies taking any medication for her pain. Pt denies nausea, vomiting and constipation. There are no other acute medical concerns at this time. Social hx: Former tobacco smoker (Quit 23 years ago), No EtOH use PCP: Lizett Little MD 
 
Note written by Amuary Mojica. Alla Chris, as dictated by James Bermeo MD 3:25 PM 
 
 
 
 
 
The history is provided by the patient. No  was used. Past Medical History:  
Diagnosis Date Vijay.Derrick Arthritis 2014 Shoulders, Hips, Knees, Ankles, Back  Esophageal varices ([address]  Liver disease 2017 Cirrohis  Neurological disorder Past Surgical History:  
Procedure Laterality Date  COLONOSCOPY Left 1/10/2018 COLONOSCOPY performed by Richie Sheriffutant. Jaimie Negron MD at [facility]  HX GI    
 upper GI  
 HX GYN    
  No family history on file. Social History Socioeconomic History  Marital status:  Spouse name: Not on file  Number of children: Not on file  Years of education: Not on file  Highest education level: Not on file Social Needs  Financial resource strain: Not on file  Food insecurity - worry: Not on file  Food insecurity - inability: Not on file  Transportation needs - medical: Not on file  Transportation needs - non-medical: Not on file Occupational History  Not on file Tobacco Use  Smoking status: Former Smoker Last attempt to quit: 1996 Years since quittin.5  Smokeless tobacco: Never Used Substance and Sexual Activity  Alcohol use: No  
 Drug use: No  
 Sexual activity: Not on file Other Topics Concern  Not on file Social History Narrative  Not on file ALLERGIES: Morphine Review of Systems There were no vitals filed for this visit. Physical Exam  
Constitutional: She is oriented to person, place, and time. She appears well-developed and well-nourished. No distress. HENT:  
Head: Normocephalic and atraumatic. Eyes: Pupils are equal, round, and reactive to light. Neck: Normal range of motion. Neck supple. Cardiovascular: Normal rate, regular rhythm and normal heart sounds. Exam reveals no gallop and no friction rub. No murmur heard. Pulmonary/Chest: Effort normal and breath sounds normal. No respiratory distress. She has no wheezes. Abdominal: Soft. Bowel sounds are normal. She exhibits no distension. There is no tenderness. There is no rebound and no guarding. Musculoskeletal:  
     Right knee: She exhibits decreased range of motion. She exhibits no swelling, no effusion, no ecchymosis and no deformity. Tenderness found. Left knee: She exhibits decreased range of motion. Tenderness found. Neurological: She is alert and oriented to person, place, and time. Skin: Skin is warm. No rash noted. She is not diaphoretic. Psychiatric: She has a normal mood and affect. Her behavior is normal. Judgment and thought content normal.  
Nursing note and vitals reviewed. MDM This is a 77-year-old female with past medical history, review of systems, physical exam as above, presenting with complaints of acute on chronic bilateral knee pain. Patient states the pain is acute, however chart review indicates the pain bilateral nature, ongoing for several years, and a history of plain films showing severe osteoarthritis.   Patient denies new injuries, states her primary care physician has suggested she may require referral to orthopedics. Physical exam remarkable for chronically ill-appearing elderly female, ambulating with the use of a walker, with global pain to palpation of the bilateral knees, with crepitus, however without instability. Suspect chronic arthritis, plan to obtain plain films of the lumbar spine and pelvis, as patient also reports pain in those areas. We'll offer her steroid burst, for arthritis flare, and referral to orthopedics. Procedures 5:10 PM 
No acute finding on plain films, will provide steroid bust for arthritis flare and recommend f/u with PCP and Orthopedics.

## 2019-02-17 NOTE — DISCHARGE INSTRUCTIONS
Patient Education        Artritis: Instrucciones de cuidado - [ Arthritis: Care Instructions ]  Instrucciones de cuidado  La artritis, también llamada osteoartritis, es un desgaste del cartílago que amortigua las articulaciones. Cuando el cartílago se desgasta, los huesos se frotan entre sí. [place] causa dolor y rigidez. Muchas personas tienen algo de artritis a medida que envejecen. Con mayor frecuencia, la artritis afecta las articulaciones de la columna vertebral, las em, las [address] pies. Usted puede monika [employer] sencillas para proteger justus articulaciones, aliviar el dolor y ayudarle a permanecer activo. La atención de seguimiento es lam parte clave de tam tratamiento y seguridad. Asegúrese de hacer y acudir a todas las citas, y llame a tam médico si está teniendo problemas. También es lam buena idea saber los resultados de justus exámenes y mantener lma lista de los medicamentos que bony. ¿Cómo puede cuidarse en el hogar? · Mantenga un peso saludable. Tener sobrepeso significa un esfuerzo adicional para las articulaciones. · Hable con tam médico o fisioterapeuta acerca de los ejercicios que le ayudarán a aliviar el dolor de las articulaciones. ? Estírese. Es posible que pueda disfrutar de formas suaves de yoga para ayudar a mantener flexibles las articulaciones y los músculos. ? Camine en lugar de correr. Otros tipos de ejercicio que sobrecargan menos las articulaciones incluyen montar en torin Ced[place], rosa chi o hacer ejercicios en el agua. ? Levante pesas. Tener los músculos olivier ayuda a reducir la carga en las articulaciones. Por ejemplo, los músculos más olivier en los muslos reducen parte de la carga sobre las rodillas y las caderas. Aprenda la manera correcta de levantar pesas para no empeorar el dolor de las articulaciones. · Cristopher Cantrell medicamentos exactamente ofelia le fueron recetados. Llame a tam médico si vi estar teniendo problemas con tam medicamento.   · [employer] analgésicos (medicamentos para el dolor) exactamente según las indicaciones. ? Si el médico le recetó un analgésico, tómelo según las indicaciones. ? Si no está tomando un analgésico recetado, pregúntele a tam médico si puede monika tim de [employer]. · Si necesita ayuda para desplazarse, use un bastón, lam Vouni, un andador u otro aparato. Pueden ayudar a descansar las articulaciones. [employer] usar otras cosas para facilitarse la clyde, ofelia un asiento de inodoro más alto y asas acolchadas en los utensilios de cocina. · No se siente en cheyenne bajas, ya que puede resultarle difícil ponerse de pie. · Ponga calor o frío sobre las articulaciones adoloridas según sea necesario. Use lo que [place]. También puede alternar las compresas calientes y frías. ? Aplíquese calor 2 o 3 veces al día por entre 20 y 27 minutos, usando lam almohadilla térmica, [facility] ducha caliente o lam compresa caliente, para aliviar el dolor y la rigidez. ? Colóquese hielo o lam compresa fría en la articulación adolorida por entre 10 y 21 minutos cada vez. Póngase un paño oquendo entre el hielo y la piel. ¿Cuándo debe pedir ayuda? Llame a tam médico ahora mismo o busque atención médica inmediata si:    · Tiene hinchazón, calor o dolor repentinos en alguna articulación.     · Cherylene Fort lam articulación y tiene fiebre o salpullido.     · El dolor es tan carlos que no puede usar lam articulación.    Preste especial atención a los cambios en tam sreekanth y asegúrese de comunicarse con tam médico si:    · Tiene síntomas articulares leves que continúan aun con más de 6 semanas de cuidado en el hogar.     · Tiene dolor de estómago u otros problemas con justus medicamentos. ¿Dónde puede encontrar más información en inglés? Rashmi Deleon a http://constance-brian.info/. Rossy [id number] en la búsqueda para aprender más acerca de \"Artritis: Instrucciones de cuidado - [ Arthritis: Care Instructions ]. \"  Revisado: 10 matthieu, 2018  Versión del contenido: 11.9  © 2842-2512 Healthwise, Incorporated. Las instrucciones de cuidado fueron adaptadas bajo licencia por [employer] (which disclaims liability or warranty for this information). Si usted tiene [place] afección médica o sobre estas instrucciones, siempre pregunte a tam profesional de sreekanth. Healthwise, Incorporated niega toda garantía o responsabilidad por tam uso de esta información. Patient Education        Dolor crónico: Instrucciones de cuidado - [ Chronic Pain: Care Instructions ]  Instrucciones de cuidado    El dolor crónico es un dolor que dura mucho tiempo (meses o incluso años) y podría tener o no tener lam causa vladislav. Es distinto del dolor chip, que suele tener lam causa vladislav, ofelia lam lesión o lam enfermedad, y mejora con el tiempo. El dolor crónico:  · Es de duración prolongada, jaime puede ser distinto cada día. · No desaparece a pesar de los esfuerzos para eliminarlo. · Puede interrumpir el sueño y causar fatiga. · Puede causar depresión o ansiedad. · Puede causar tensión muscular y provocar más dolor. · Puede perturbar tam trabajo, justus pasatiempos, tam clyde doméstica y justus relaciones con familiares y amigos. El dolor crónico es lam afección médica muy real. No está sólo en tam felicitas. El tratamiento puede ayudarle y normalmente incluye métodos utilizados en conjunto, ofelia medicamentos, fisioterapia, ejercicio y otros tratamientos. Filipe Cirilo a relajarse y a [address] las tendencias de pensamientos negativos también pueden ayudarle a sobrellevarlo. El dolor crónico es complejo. Tener lam participación Sanjay Rene en tam propio tratamiento le ayudará a manejar mejor el dolor. Infórmele a tam médico si tiene problemas para controlar tam dolor. Es posible que tenga que intentar varias cosas antes de encontrar lo que funcione mejor para usted. La atención de seguimiento es lam parte clave de tam tratamiento y seguridad.  Asegúrese de hacer y acudir a todas las citas, y llame a tam médico si está teniendo problemas. También es lam buena idea saber los resultados de destiney exámenes y mantener lam lista de los medicamentos que bony. ¿Cómo puede cuidarse en el hogar? · Siga tam propio ritmo. Divida las tareas grandes en tareas más pequeñas. Deje las tareas más difíciles para los días en que tenga menos dolor o alterne las tareas difíciles con otras más fáciles. Tómese descansos. · Relájese y reduzca el estrés. Las técnicas de relajación, ofelia la respiración profunda o la meditación, pueden ayudar. · Manténgase en movimiento. El ejercicio suave a diario puede con el tiempo ayudar a reducir el dolor. Pruebe con ejercicios de bajo o ningún impacto, ofelia caminar, nadar y usar lam bicicleta estática. Sheeba estiramientos para mantenerse flexible. · Pruebe con calor, compresas frías y masajes. · Duerma lo suficiente. El dolor crónico puede hacerle sentir cansado y agotar tam energía. Hable con tam médico si le tal dormir debido al Alem Lyons. · Piense de manera positiva. Destiney pensamientos pueden afectar el nivel de dolor. Sheeba cosas que disfrute para distraerse cuando sienta dolor en lugar de concentrarse en él. Juan José lam película, tara un libro, escuche música o pase tiempo con un amigo. · Si le parece que está deprimido, hable con tam médico acerca del tratamiento. · Mantenga un registro diario de tam dolor. Registre de [place] destiney estados de ánimo, pensamientos, patrones de [place] y medicamentos afectan el dolor. Puede descubrir que el dolor empeora wesly o después de ciertas actividades o cuando siente lam emoción determinada. Llevar un registro de tam dolor les puede ayudar a usted y tam médico a encontrar las mejores maneras de tratar tam dolor. · [employer] analgésicos (medicamentos para el dolor) exactamente según las indicaciones. ? Si el médico le recetó un analgésico, tómelo según las indicaciones.   ? Si no está tomando un analgésico recetado, pregúntele a tam médico si puede monika un medicamento de [employer]. Cómo reducir el estreñimiento causado por los analgésicos  · Incluya en tam alimentación diaria frutas, vegetales, frijoles (habichuelas) y granos integrales. Estos alimentos son ricos en fibra. · Tania abundantes líquidos, suficientes para que tam orina sea de color amarillo kia o transparente ofelia el agua. Si tiene [facility], el corazón o el hígado y tiene que Monroe's líquidos, hable con tam médico antes de aumentar tam consumo. · Si tam médico lo recomienda, harjinder más ejercicio. Caminar es lam buena opción. Poco a poco, aumente la distancia que [employer]. Trate de hacer al menos 30 minutos la mayoría de los días de la [place]. · Programe tiempo todos los días para evacuar el intestino. Flavia Pickens rutina diaria podría ayudar. Tómese tam tiempo y no se esfuerce cuando esté evacuando. ¿Cuándo debe pedir ayuda? Llame a tam médico ahora mismo o busque atención médica inmediata si:    · El dolor empeora o está fuera de control.     · Se siente juan ramon o deprimido, o no disfruta de las cosas que solía [place]. Puede estar deprimido, lo que es común [employer] personas que tienen dolor crónico. La depresión se puede tratar.     · Tiene vómito o retortijones por más de 2 horas.    Preste especial atención a los cambios en tam sreekanth y asegúrese de comunicarse con tam médico si:    · No puede dormir por causa del dolor.     · Se siente muy preocupado o ansioso respecto a tam dolor.     · Tiene problemas para monika justus analgésicos.     · Tiene inquietudes con respecto al analgésico.     · Tiene problemas con la evacuación intestinal, tales ofelia:  ? No ha evacuado en 3 días. ? Hay fernanda en la amada anal, en las heces o en el papel higiénico.  ? Tiene diarrea por más de 24 horas. ¿Dónde puede encontrar más información en inglés? Reinaldo Huynh a http://constance-brian.info/.   [place] Spotted [id number] en la búsqueda para aprender más acerca de \"Dolor crónico: Instrucciones de cuidado - [ Chronic Pain: Care Instructions ]. \"  Revisado: 3 matthieu, 2018  Versión del contenido: 11.9  © 2795-2926 [employer]. Las instrucciones de cuidado fueron adaptadas bajo licencia por [employer] (which disclaims liability or warranty for this information). Si usted tiene Flora King[place] afección médica o sobre estas instrucciones, siempre pregunte a tam profesional de sreekanth. [employer] niega toda garantía o responsabilidad por tam uso de esta información.

## 2019-03-04 NOTE — PROGRESS NOTES
Coordination of Care  1. Have you been to the ER, urgent care clinic since your last visit? Hospitalized since your last visit? Yes When: [facility]    2. Have you seen or consulted any other health care providers outside of the [facility] since your last visit? Include any pap smears or colon screening. No    Does the patient need refills? NO    Learning Assessment Complete?  yes Detail Level: Detailed Comment: Resulting from cryotherapy

## 2019-06-18 ENCOUNTER — OFFICE VISIT (OUTPATIENT)
Dept: FAMILY MEDICINE CLINIC | Age: 64
End: 2019-06-18

## 2019-06-18 VITALS
HEART RATE: 63 BPM | OXYGEN SATURATION: 97 % | HEIGHT: 60 IN | DIASTOLIC BLOOD PRESSURE: 80 MMHG | TEMPERATURE: 98.1 F | WEIGHT: 202 LBS | SYSTOLIC BLOOD PRESSURE: 240 MMHG | BODY MASS INDEX: 39.66 KG/M2

## 2019-06-18 DIAGNOSIS — M79.672 PAIN IN BOTH FEET: ICD-10-CM

## 2019-06-18 DIAGNOSIS — K76.6 PORTAL HYPERTENSION (HCC): ICD-10-CM

## 2019-06-18 DIAGNOSIS — M25.562 CHRONIC PAIN OF BOTH KNEES: ICD-10-CM

## 2019-06-18 DIAGNOSIS — I10 ESSENTIAL HYPERTENSION: Primary | ICD-10-CM

## 2019-06-18 DIAGNOSIS — M79.671 PAIN IN BOTH FEET: ICD-10-CM

## 2019-06-18 DIAGNOSIS — M25.561 CHRONIC PAIN OF BOTH KNEES: ICD-10-CM

## 2019-06-18 DIAGNOSIS — K74.60 CIRRHOSIS OF LIVER WITHOUT ASCITES, UNSPECIFIED HEPATIC CIRRHOSIS TYPE (HCC): ICD-10-CM

## 2019-06-18 DIAGNOSIS — G89.29 CHRONIC PAIN OF BOTH KNEES: ICD-10-CM

## 2019-06-18 LAB — HGB BLD-MCNC: 10.9 G/DL

## 2019-06-18 RX ORDER — FUROSEMIDE 40 MG/1
40 TABLET ORAL DAILY
Qty: 30 TAB | Refills: 11 | Status: SHIPPED | OUTPATIENT
Start: 2019-06-18 | End: 2020-01-16 | Stop reason: SDUPTHER

## 2019-06-18 RX ORDER — GABAPENTIN 100 MG/1
100 CAPSULE ORAL 3 TIMES DAILY
Qty: 90 CAP | Refills: 11 | Status: SHIPPED | OUTPATIENT
Start: 2019-06-18 | End: 2020-01-16

## 2019-06-18 RX ORDER — TRAMADOL HYDROCHLORIDE 50 MG/1
50 TABLET ORAL
Qty: 60 TAB | Refills: 2 | Status: SHIPPED | OUTPATIENT
Start: 2019-06-18 | End: 2019-07-18

## 2019-06-18 RX ORDER — PROPRANOLOL HYDROCHLORIDE 80 MG/1
40 TABLET ORAL 2 TIMES DAILY
Qty: 30 TAB | Refills: 11 | Status: SHIPPED | OUTPATIENT
Start: 2019-06-18 | End: 2020-01-16

## 2019-06-18 RX ORDER — LISINOPRIL 40 MG/1
40 TABLET ORAL DAILY
Qty: 30 TAB | Refills: 11 | Status: SHIPPED | OUTPATIENT
Start: 2019-06-18 | End: 2020-01-16

## 2019-06-18 RX ORDER — SPIRONOLACTONE 100 MG/1
100 TABLET, FILM COATED ORAL DAILY
Qty: 30 TAB | Refills: 11 | Status: SHIPPED | OUTPATIENT
Start: 2019-06-18 | End: 2020-01-16 | Stop reason: SDUPTHER

## 2019-06-18 NOTE — PROGRESS NOTES
Coordination of Care  1. Have you been to the ER, urgent care clinic since your last visit? Hospitalized since your last visit? No    2. Have you seen or consulted any other health care providers outside of the [address] [place] [address] since your last visit? Include any pap smears or colon screening. No    Does the patient need refills?  YES    Learning Assessment Complete? yes    Results for orders placed or performed in visit on 06/18/19   AMB POC HEMOGLOBIN (HGB)   Result Value Ref Range    Hemoglobin (POC) 10.9
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
## 2019-06-18 NOTE — PATIENT INSTRUCTIONS
Dolor crónico: Instrucciones de cuidado - [ Chronic Pain: Care Instructions ]  Instrucciones de cuidado    El dolor crónico es un dolor que dura mucho tiempo (meses o incluso años) y podría tener o no tener lam causa vladislav. Es distinto del dolor chip, que suele tener lam causa vladislav, ofelia lam lesión o lam enfermedad, y mejora con el tiempo. El dolor crónico:  · Es de duración prolongada, jaime puede ser distinto cada día. · No desaparece a pesar de los esfuerzos para eliminarlo. · Puede interrumpir el sueño y causar fatiga. · Puede causar depresión o ansiedad. · Puede causar tensión muscular y provocar más dolor. · Puede perturbar tam trabajo, justus pasatiempos, tam clyde doméstica y justus relaciones con familiares y amigos. El dolor crónico es lam afección médica muy real. No está sólo en tam felicitas. El tratamiento puede ayudarle y normalmente incluye métodos utilizados en conjunto, ofelia medicamentos, fisioterapia, ejercicio y otros tratamientos. Namon Leopard a relajarse y a [address] las tendencias de pensamientos negativos también pueden ayudarle a sobrellevarlo. El dolor crónico es complejo. Tener lam participación Sanjay Rene en tam propio tratamiento le ayudará a manejar mejor el dolor. Infórmele a tam médico si tiene problemas para controlar tam dolor. Es posible que tenga que intentar varias cosas antes de encontrar lo que funcione mejor para usted. La atención de seguimiento es lam parte clave de tam tratamiento y seguridad. Asegúrese de hacer y acudir a todas las citas, y llame a tam médico si está teniendo problemas. También es lam buena idea saber los resultados de justus exámenes y mantener lam lista de los medicamentos que bony. ¿Cómo puede cuidarse en el hogar? · Siga tam propio ritmo. Divida las tareas grandes en tareas más pequeñas. Deje las tareas más difíciles para los días en que tenga menos dolor o alterne las tareas difíciles con otras más fáciles. Tómese descansos. · Relájese y reduzca el estrés.  Paulo Kim técnicas de relajación, ofelia la respiración profunda o la meditación, pueden ayudar. · Manténgase en movimiento. El ejercicio suave a diario puede con el tiempo ayudar a reducir el dolor. Pruebe con ejercicios de bajo o ningún impacto, ofelia caminar, nadar y usar lam bicicleta estática. Sheeba estiramientos para mantenerse flexible. · Pruebe con calor, compresas frías y masajes. · Duerma lo suficiente. El dolor crónico puede hacerle sentir cansado y agotar tam energía. Hable con tam médico si le tal dormir debido al Alem Lyons. · Piense de manera positiva. Justus pensamientos pueden afectar el nivel de dolor. Sheeba cosas que disfrute para distraerse cuando sienta dolor en lugar de concentrarse en él. Juan José lam película, tara un libro, escuche música o pase tiempo con un amigo. · Si le parece que está deprimido, hable con tam médico acerca del tratamiento. · Mantenga un registro diario de tam dolor. Registre de [place] justus estados de ánimo, pensamientos, patrones de [place], [place] y medicamentos afectan el dolor. Puede descubrir que el dolor empeora wesly o después de ciertas actividades o cuando siente lam emoción determinada. Llevar un registro de tam dolor les puede ayudar a usted y tam médico a encontrar las mejores maneras de tratar tam dolor. · [employer] analgésicos (medicamentos para el dolor) exactamente según las indicaciones. ? Si el médico le recetó un analgésico, tómelo según las indicaciones. ? Si no está tomando un analgésico recetado, pregúntele a tam médico si puede monika un medicamento de [employer]. Cómo reducir el estreñimiento causado por los analgésicos  · Incluya en tam alimentación diaria frutas, vegetales, frijoles (habichuelas) y granos integrales. Estos alimentos son ricos en fibra. · Tania abundantes líquidos, suficientes para que tam orina sea de color amarillo kia o transparente ofelia el agua.  Si tiene [place] [employer] [facility] [employer], el corazón o el hígado y tiene que Samantha's líquidos, hable con tam médico antes de aumentar tam consumo. · Si tam médico lo recomienda, harjinder más ejercicio. Caminar es lam buena opción. Poco a poco, aumente la distancia que [employer]. Trate de hacer al menos 30 minutos la mayoría de los días de la [place]. · Programe tiempo todos los días para evacuar el intestino. Rosia Crest rutina diaria podría ayudar. Tómese tam tiempo y no se esfuerce cuando esté evacuando. ¿Cuándo debe pedir ayuda? Llame a tam médico ahora mismo o busque atención médica inmediata si:    · El dolor empeora o está fuera de control.     · Se siente juan ramon o deprimido, o no disfruta de las cosas que solía [place]. Puede estar deprimido, lo que es común [employer] personas que tienen dolor crónico. La depresión se puede tratar.     · Tiene vómito o retortijones por más de 2 horas.    Preste especial atención a los cambios en tam sreekanth y asegúrese de comunicarse con tam médico si:    · No puede dormir por causa del dolor.     · Se siente muy preocupado o ansioso respecto a tam dolor.     · Tiene problemas para monika justus analgésicos.     · Tiene inquietudes con respecto al analgésico.     · Tiene problemas con la evacuación intestinal, tales ofelia:  ? No ha evacuado en 3 días. ? Hay fernanda en la amada anal, en las heces o en el papel higiénico.  ? Tiene diarrea por más de 24 horas. ¿Dónde puede encontrar más información en inglés? Ronald Mccollum a http://constance-brian.info/. Terryann Les E655 en la búsqueda para aprender más acerca de \"Dolor crónico: Instrucciones de cuidado - [ Chronic Pain: Care Instructions ]. \"  Revisado: 3 matthieu, 2018  Versión del contenido: 11.9  © 5222-9626 [employer]. Las instrucciones de cuidado fueron adaptadas bajo licencia por [employer] (which disclaims liability or warranty for this information). Si usted tiene [place] afección médica o sobre estas instrucciones, siempre pregunte a tam profesional de sreekanth.  [employer] Incorporated niega toda garantía o responsabilidad por tam uso de esta información. Dolor de pecho: Instrucciones de cuidado - [ Chest Pain: Care Instructions ]  Instrucciones de cuidado    El dolor de pecho puede tener muchas causas. Algunas no son graves y mejorarán por sí solas en pocos días. Roman algunos tipos de dolor de pecho requieren más pruebas y [place]. Es posible que tam médico le haya recomendado lam visita de seguimiento dentro de las 8 a 12 horas siguientes. Si no mejora, es posible que necesite [address] pruebas o [place]. Aunque tam médico le haya dado de lucas, es necesario que esté atento a cualquier problema que se presente. El médico le hizo un cuidadoso chequeo, roman a veces los problemas pueden aparecer posteriormente. Si tiene nuevos síntomas o éstos no mejoran, obtenga atención médica de inmediato. Si tiene dolor o presión en el pecho que empeora o es diferente y que dura más de 5 minutos, o se desmayó (perdió el conocimiento), llame al 911 o busque otra ayuda de emergencia de inmediato. Acudir a lam consulta médica es sólo un paso en tam tratamiento. Aunque se sienta mejor, todavía deberá hacer lo que tam médico le recomiende, ofelia asistir a todas las visitas de seguimiento sugeridas y monika los medicamentos exactamente [employer]	[place] fueron indicados. [place] le ayudará a recuperarse y prevenir problemas futuros. ¿Cómo puede cuidarse en el hogar? · Descanse hasta que se sienta mejor. · [place] justus medicamentos exactamente ofelia le fueron recetados. Llame a tam médico si vi estar teniendo problemas con tam medicamento. · No conduzca después de monika un analgésico (medicamento para el dolor) recetado. ¿Cuándo debe pedir ayuda? Llame al 911 si:    · Se desmayó (perdió el conocimiento).     · Tiene graves dificultades para respirar.     · Tiene síntomas de un ataque al corazón. Estos podrían incluir:  ? Dene [place] en el pecho, o lam sensación extraña en el pecho.  ? Sudoración.   ? Falta de aire.  ? Náuseas o vómito. ? Dolor, presión o lam sensación extraña en la espalda, el wayne, la mandíbula, la parte superior del abdomen o en tim o ambos hombros o brazos. ? Aturdimiento o debilidad repentina. ? Latidos del corazón rápidos o irregulares. Después de llamar al 911, es posible que el operador le diga que mastique 1 aspirina para adultos o de 2 a 4 aspirinas de dosis baja. Espere a lam ambulancia. No intente conducir usted mismo.    Llame hoy a tam médico si:    · Tiene cualquier dificultad para respirar.     · El dolor en el pecho empeora.     · Siente mareos o aturdimiento, o que está a punto de desmayarse.     · No mejora ofelia se esperaba.     · Tiene dolor de pecho nuevo o diferente. ¿Dónde puede encontrar más información en inglés? [place] a http://constance-brian.info/. Escriba A120 en la búsqueda para aprender más acerca de \"Dolor de pecho: Instrucciones de cuidado - [ Chest Pain: Care Instructions ]. \"  Revisado: 23 septiembre, 2018  Versión del contenido: 11.9  © 6153-3555 Healthwise, Incorporated. Las instrucciones de cuidado fueron adaptadas bajo licencia por [employer] (which disclaims liability or warranty for this information). Si usted tiene [place] afección médica o sobre estas instrucciones, siempre pregunte a tam profesional de sreekanth. Healthwise, Incorporated niega toda garantía o responsabilidad por tam uso de esta información.

## 2019-06-18 NOTE — PROGRESS NOTES
Reviewed AVS, prescription and pharmacy location with patient. AVS printed and given along with goodrx coupons. Patient aware that provider would like to follow up about today's visit in 2 weeks with an appointment. Patient plans to return for pre-op labs later. Dr. Keyonna Hampton will call  ortho today/tomorrow to see what pt needs for pre-op paperwork/labs and once this has been establish patient will RTC for pre-op labs. This has been fully explained to the patient, who indicates understanding and agrees with plan. No further questions at this time.  Bautista Shah RN

## 2019-07-01 ENCOUNTER — HOSPITAL ENCOUNTER (OUTPATIENT)
Dept: LAB | Age: 64
Discharge: HOME | End: 2019-07-01

## 2019-07-01 ENCOUNTER — HOSPITAL ENCOUNTER (OUTPATIENT)
Dept: CT IMAGING | Age: 64
Discharge: HOME | End: 2019-07-01
Attending: ORTHOPAEDIC SURGERY
Payer: SUBSIDIZED

## 2019-07-01 ENCOUNTER — OFFICE VISIT (OUTPATIENT)
Dept: FAMILY MEDICINE CLINIC | Age: 64
End: 2019-07-01

## 2019-07-01 VITALS
WEIGHT: 201 LBS | HEART RATE: 64 BPM | HEIGHT: 60 IN | BODY MASS INDEX: 39.46 KG/M2 | SYSTOLIC BLOOD PRESSURE: 139 MMHG | DIASTOLIC BLOOD PRESSURE: 67 MMHG | TEMPERATURE: 98 F | OXYGEN SATURATION: 97 %

## 2019-07-01 DIAGNOSIS — Z01.818 PREOPERATIVE EXAMINATION: Primary | ICD-10-CM

## 2019-07-01 DIAGNOSIS — I10 ESSENTIAL HYPERTENSION: ICD-10-CM

## 2019-07-01 DIAGNOSIS — M17.0 BILATERAL PRIMARY OSTEOARTHRITIS OF KNEE: ICD-10-CM

## 2019-07-01 DIAGNOSIS — M17.0 PRIMARY OSTEOARTHRITIS OF BOTH KNEES: ICD-10-CM

## 2019-07-01 LAB
ALBUMIN SERPL-MCNC: 3.4 G/DL (ref 3.5–5)
ALBUMIN/GLOB SERPL: 1.2 {RATIO} (ref 1.1–2.2)
ALP SERPL-CCNC: 177 U/L (ref 45–117)
ALT SERPL-CCNC: 60 U/L (ref 12–78)
ANION GAP SERPL CALC-SCNC: 7 MMOL/L (ref 5–15)
AST SERPL-CCNC: 49 U/L (ref 15–37)
BASOPHILS # BLD: 0 K/UL (ref 0–0.1)
BASOPHILS NFR BLD: 0 % (ref 0–1)
BILIRUB SERPL-MCNC: 1.4 MG/DL (ref 0.2–1)
BUN SERPL-MCNC: 15 MG/DL (ref 6–20)
BUN/CREAT SERPL: 30 (ref 12–20)
CALCIUM SERPL-MCNC: 8.4 MG/DL (ref 8.5–10.1)
CHLORIDE SERPL-SCNC: 112 MMOL/L (ref 97–108)
CO2 SERPL-SCNC: 25 MMOL/L (ref 21–32)
CREAT SERPL-MCNC: 0.5 MG/DL (ref 0.55–1.02)
DIFFERENTIAL METHOD BLD: ABNORMAL
EOSINOPHIL # BLD: 0 K/UL (ref 0–0.4)
EOSINOPHIL NFR BLD: 1 % (ref 0–7)
ERYTHROCYTE [DISTWIDTH] IN BLOOD BY AUTOMATED COUNT: 14.5 % (ref 11.5–14.5)
EST. AVERAGE GLUCOSE BLD GHB EST-MCNC: ABNORMAL MG/DL
GLOBULIN SER CALC-MCNC: 2.8 G/DL (ref 2–4)
GLUCOSE SERPL-MCNC: 103 MG/DL (ref 65–100)
HBA1C MFR BLD: 4 % (ref 4.2–6.3)
HCT VFR BLD AUTO: 38.1 % (ref 35–47)
HGB BLD-MCNC: 11.9 G/DL (ref 11.5–16)
IMM GRANULOCYTES # BLD AUTO: 0 K/UL (ref 0–0.04)
IMM GRANULOCYTES NFR BLD AUTO: 0 % (ref 0–0.5)
LYMPHOCYTES # BLD: 0.5 K/UL (ref 0.8–3.5)
LYMPHOCYTES NFR BLD: 16 % (ref 12–49)
MCH RBC QN AUTO: 33.5 PG (ref 26–34)
MCHC RBC AUTO-ENTMCNC: 31.2 G/DL (ref 30–36.5)
MCV RBC AUTO: 107.3 FL (ref 80–99)
MONOCYTES # BLD: 0.2 K/UL (ref 0–1)
MONOCYTES NFR BLD: 8 % (ref 5–13)
NEUTS SEG # BLD: 2.3 K/UL (ref 1.8–8)
NEUTS SEG NFR BLD: 75 % (ref 32–75)
NRBC # BLD: 0 K/UL (ref 0–0.01)
NRBC BLD-RTO: 0 PER 100 WBC
PLATELET # BLD AUTO: 55 K/UL (ref 150–400)
PMV BLD AUTO: 12.4 FL (ref 8.9–12.9)
POTASSIUM SERPL-SCNC: 4.1 MMOL/L (ref 3.5–5.1)
PROT SERPL-MCNC: 6.2 G/DL (ref 6.4–8.2)
RBC # BLD AUTO: 3.55 M/UL (ref 3.8–5.2)
RBC MORPH BLD: ABNORMAL
SODIUM SERPL-SCNC: 144 MMOL/L (ref 136–145)
WBC # BLD AUTO: 3 K/UL (ref 3.6–11)

## 2019-07-01 PROCEDURE — 85025 COMPLETE CBC W/AUTO DIFF WBC: CPT

## 2019-07-01 PROCEDURE — 73700 CT LOWER EXTREMITY W/O DYE: CPT

## 2019-07-01 PROCEDURE — 80053 COMPREHEN METABOLIC PANEL: CPT

## 2019-07-01 PROCEDURE — 83036 HEMOGLOBIN GLYCOSYLATED A1C: CPT

## 2019-07-01 PROCEDURE — 87086 URINE CULTURE/COLONY COUNT: CPT

## 2019-07-01 NOTE — PROGRESS NOTES
Printed AVS, provided to pt and reviewed. Pt indicated understanding and had no questions. Pt has an appt she did not know she had at [facility] at 3pm. The pt was told she had to be there by 2:30 pm. The pt was given the address and appt for the CT on the printed Mary Doyle was the .   Catherine Damon RN

## 2019-07-01 NOTE — PROGRESS NOTES
HISTORY OF PRESENT ILLNESS  Maranda Bautista is a 59 y.o. female. HPI  Patient is here today for preoperative clearance for bilateral total knee replacement. Date has not been set yet. Feels well, no chest pain  No shortness of breath. She will be called by orthopedic surgery office to have preoperative labs and ecg. Past Medical history include cirrhosis and hypertension. Compliant with medications    Past Medical History:   Diagnosis Date    Arthritis     Shoulders, Hips, Knees, Ankles, Back    Esophageal varices ([address]     Liver disease 2017    Cirrohis    Neurological disorder        Past Surgical History:   Procedure Laterality Date    COLONOSCOPY Left 1/10/2018    COL[facility] at [facility] ENDOSCOPY    HX CHOLECYSTECTOMY      HX GI      upper GI    HX GYN             Social History     Socioeconomic History    Marital status:      Spouse name: Not on file    Number of children: Not on file    Years of education: Not on file    Highest education level: Not on file   Tobacco Use    Smoking status: Former Smoker     Last attempt to quit: 1996     Years since quittin.8    Smokeless tobacco: Never Used   Substance and Sexual Activity    Alcohol use: No    Drug use: No       No family history on file. Current Outpatient Medications   Medication Sig Dispense Refill    furosemide (LASIX) 40 mg tablet Take 1 Tab by mouth daily. [place] 1 tableta lam vez al calvin 30 Tab 11    spironolactone (ALDACTONE) 100 mg tablet Take 1 Tab by mouth daily. [place] 1 tableta lam vez al calvin 30 Tab 11    lisinopril (PRINIVIL, ZESTRIL) 40 mg tablet Take 1 Tab by mouth daily. For blood pressure. [place] 1 tableta lam vez al [place]vin para presion 30 Tab 11    gabapentin (NEURONTIN) 100 mg capsule Take 1 Cap by mouth three (3) times daily. For nerve pain.  [place] 1 tableta maciej veces al calvin para dolor de nervios 90 Cap 11    traMADol (ULTRAM) 50 mg tablet Take 1 Tab by mouth every six (6) hours as needed for Pain for up to 30 days. Max Daily Amount: 200 mg. Take 1 every 6 hours as needed for pain 60 Tab 2    propranolol (INDERAL) 80 mg tablet Take 0.5 Tabs by mouth two (2) times a day. [place] 1/2 tableta dos veces al calvin 30 Tab 11    diclofenac (VOLTAREN) 1 % gel Apply 2 g to affected area four (4) times daily. 100 g 3       Allergies   Allergen Reactions    Morphine Nausea and Vomiting and Other (comments)     Histamine release? Red streak on arm after IV injection     Review of Systems   Constitutional: Negative for chills, malaise/fatigue and weight loss. Respiratory: Negative for cough, sputum production, shortness of breath and wheezing. Cardiovascular: Negative for chest pain, palpitations and orthopnea. Gastrointestinal: Negative for abdominal pain, diarrhea, nausea and vomiting. Genitourinary: Negative for dysuria and frequency. Musculoskeletal: Positive for joint pain. Negative for back pain and neck pain. Bilateral knee joint pain   Neurological: Negative for dizziness, tingling, tremors and headaches. Psychiatric/Behavioral: Negative for depression and suicidal ideas. /67 (BP 1 Location: Left arm, BP Patient Position: Sitting)   Pulse 64   Temp 98 °F (36.7 °C) (Oral)   Ht 5' (1.524 m)   Wt 201 lb (91.2 kg)   SpO2 97%   BMI 39.26 kg/m²   Physical Exam   Constitutional: She is oriented to person, place, and time. She appears well-developed and well-nourished. Uses a walker to assist with gait   HENT:   Head: Normocephalic. Right Ear: External ear normal.   Left Ear: External ear normal.   Eyes: Pupils are equal, round, and reactive to light. Conjunctivae and EOM are normal.   Neck: Normal range of motion. Neck supple. No thyromegaly present. Cardiovascular: Normal rate, regular rhythm and normal heart sounds. No murmur heard. Pulmonary/Chest: Effort normal and breath sounds normal. No respiratory distress. She has no wheezes. She has no rales. Abdominal: Soft. Bowel sounds are normal. She exhibits no distension. There is no rebound. Musculoskeletal: She exhibits edema, tenderness and deformity. Bilateral knee swelling, tenderness and deformity   Neurological: She is alert and oriented to person, place, and time. No cranial nerve deficit. Skin: Skin is warm. No rash noted. No erythema. ASSESSMENT and PLAN  Diagnoses and all orders for this visit:    1. Preoperative examination  -     CULTURE, URINE  -     METABOLIC PANEL, COMPREHENSIVE  -     CBC WITH AUTOMATED DIFF  -     HEMOGLOBIN A1C WITH EAG    2. Essential hypertension  -     METABOLIC PANEL, COMPREHENSIVE  -     CBC WITH AUTOMATED DIFF  -     HEMOGLOBIN A1C WITH EAG    3. Bilateral primary osteoarthritis of knee      Patient represents above average surgical risk, blood pressure is controlled and has been stable with her cirrhosis, may proceed with surgery as planned. Caution should be taking with bleeding and medications used during perioperative period.

## 2019-07-01 NOTE — PROGRESS NOTES
Coordination of Care  1. Have you been to the ER, urgent care clinic since your last visit? Hospitalized since your last visit? No    2. Have you seen or consulted any other health care providers outside of the [address] [place] [address] since your last visit? Include any pap smears or colon screening. No    Does the patient need refills? NO    Learning Assessment Complete?  yes  Depression Screening complete in the past 12 months? yes

## 2019-07-02 NOTE — PROGRESS NOTES
CBC normal, no anemia (hgb 11.9). WBC and PLTs low in the setting of cirrhosis. Will defer to surgeon re: platelet level safe for surgery  CMP wnl, no kidney, sugar or electrolyte problems identified. Total bili, ALT and alk phos slightly elevated. Pt with known cirrhosis.   A1c normal, no diabetes or pre-diabetes  UCx pending

## 2019-07-03 LAB
BACTERIA SPEC CULT: NORMAL
CC UR VC: NORMAL
SERVICE CMNT-IMP: NORMAL

## 2019-08-16 ENCOUNTER — HOSPITAL ENCOUNTER (OUTPATIENT)
Dept: CT IMAGING | Age: 64
Discharge: HOME | End: 2019-08-16
Attending: ORTHOPAEDIC SURGERY
Payer: SUBSIDIZED

## 2019-08-16 DIAGNOSIS — M17.12 PRIMARY OSTEOARTHRITIS OF LEFT KNEE: ICD-10-CM

## 2019-08-16 PROCEDURE — 73700 CT LOWER EXTREMITY W/O DYE: CPT

## 2019-10-10 ENCOUNTER — OFFICE VISIT (OUTPATIENT)
Dept: HEMATOLOGY | Age: 64
End: 2019-10-10

## 2019-10-10 VITALS
HEART RATE: 63 BPM | WEIGHT: 191 LBS | BODY MASS INDEX: 37.5 KG/M2 | TEMPERATURE: 97.8 F | SYSTOLIC BLOOD PRESSURE: 138 MMHG | DIASTOLIC BLOOD PRESSURE: 60 MMHG | HEIGHT: 60 IN | OXYGEN SATURATION: 98 % | RESPIRATION RATE: 16 BRPM

## 2019-10-10 DIAGNOSIS — K74.60 CIRRHOSIS OF LIVER WITHOUT ASCITES, UNSPECIFIED HEPATIC CIRRHOSIS TYPE (HCC): Primary | ICD-10-CM

## 2019-10-10 NOTE — PROGRESS NOTES
Identified pt with two pt identifiers(name and ). Reviewed record in preparation for visit and have obtained necessary documentation. Chief Complaint   Patient presents with    Surgical Clearance     Pt states she is getting kneeSurgery on 28 Oct 2019, just needs medical clearance        Health Maintenance Due   Topic    Pneumococcal 0-64 years (1 of 1 - PPSV23)    PAP AKA CERVICAL CYTOLOGY     Shingrix Vaccine Age 50> (1 of 2)    BREAST CANCER SCRN MAMMOGRAM     FOBT Q 1 YEAR AGE 54-65     Influenza Age 5 to Adult         Visit Vitals  /47 (BP 1 Location: Left arm, BP Patient Position: Sitting)   Pulse 63   Temp 97.8 °F (36.6 °C) (Tympanic)   Resp 16   Ht 5' (1.524 m)   Wt 191 lb (86.6 kg)   SpO2 98%   BMI 37.30 kg/m²     Pain Scale: /10    Coordination of Care Questionnaire:  :   1. Have you been to the ER, urgent care clinic since your last visit? Hospitalized since your last visit? No    2. Have you seen or consulted any other health care providers outside of the [address] [place] [address] since your last visit? Include any pap smears or colon screening.  Yes Where: Urgent Care for UTI, 3 weeks ago
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
## 2019-10-10 NOTE — Clinical Note
10/27/19 Patient: Farooq Quintana YOB: 1955 Date of Visit: 10/10/2019 Clifton Godfrey MD 
[facility] [address] [facility] [address] VIA In Basket Dear Clifton Godfrey MD, Thank you for referring Ms. Farooq Quintana to [address] for evaluation. My notes for this consultation are attached. If you have questions, please do not hesitate to call me. I look forward to following your patient along with you. Sincerely, Jennifer Flores MD

## 2019-10-11 LAB
ALBUMIN SERPL-MCNC: 3.4 G/DL (ref 3.6–4.8)
ALP SERPL-CCNC: 121 IU/L (ref 39–117)
ALT SERPL-CCNC: 27 IU/L (ref 0–32)
AST SERPL-CCNC: 36 IU/L (ref 0–40)
BASOPHILS # BLD AUTO: 0 X10E3/UL (ref 0–0.2)
BASOPHILS NFR BLD AUTO: 0 %
BILIRUB DIRECT SERPL-MCNC: 0.54 MG/DL (ref 0–0.4)
BILIRUB SERPL-MCNC: 1.4 MG/DL (ref 0–1.2)
BUN SERPL-MCNC: 16 MG/DL (ref 8–27)
BUN/CREAT SERPL: 37 (ref 12–28)
CALCIUM SERPL-MCNC: 8.9 MG/DL (ref 8.7–10.3)
CHLORIDE SERPL-SCNC: 110 MMOL/L (ref 96–106)
CO2 SERPL-SCNC: 23 MMOL/L (ref 20–29)
CREAT SERPL-MCNC: 0.43 MG/DL (ref 0.57–1)
EOSINOPHIL # BLD AUTO: 0 X10E3/UL (ref 0–0.4)
EOSINOPHIL NFR BLD AUTO: 1 %
ERYTHROCYTE [DISTWIDTH] IN BLOOD BY AUTOMATED COUNT: 13.3 % (ref 12.3–15.4)
GLUCOSE SERPL-MCNC: 92 MG/DL (ref 65–99)
HCT VFR BLD AUTO: 30.3 % (ref 34–46.6)
HGB BLD-MCNC: 10.6 G/DL (ref 11.1–15.9)
IMM GRANULOCYTES # BLD AUTO: 0 X10E3/UL (ref 0–0.1)
IMM GRANULOCYTES NFR BLD AUTO: 0 %
INR PPP: 1.2 (ref 0.8–1.2)
LYMPHOCYTES # BLD AUTO: 0.5 X10E3/UL (ref 0.7–3.1)
LYMPHOCYTES NFR BLD AUTO: 19 %
MCH RBC QN AUTO: 34.5 PG (ref 26.6–33)
MCHC RBC AUTO-ENTMCNC: 35 G/DL (ref 31.5–35.7)
MCV RBC AUTO: 99 FL (ref 79–97)
MONOCYTES # BLD AUTO: 0.2 X10E3/UL (ref 0.1–0.9)
MONOCYTES NFR BLD AUTO: 7 %
MORPHOLOGY BLD-IMP: ABNORMAL
NEUTROPHILS # BLD AUTO: 1.7 X10E3/UL (ref 1.4–7)
NEUTROPHILS NFR BLD AUTO: 73 %
PLATELET # BLD AUTO: 47 X10E3/UL (ref 150–450)
POTASSIUM SERPL-SCNC: 3.7 MMOL/L (ref 3.5–5.2)
PROT SERPL-MCNC: 5.9 G/DL (ref 6–8.5)
PROTHROMBIN TIME: 12.6 SEC (ref 9.1–12)
RBC # BLD AUTO: 3.07 X10E6/UL (ref 3.77–5.28)
SODIUM SERPL-SCNC: 147 MMOL/L (ref 134–144)
WBC # BLD AUTO: 2.4 X10E3/UL (ref 3.4–10.8)

## 2019-10-14 LAB
AFP L3 MFR SERPL: NORMAL % (ref 0–9.9)
AFP SERPL-MCNC: 4.2 NG/ML (ref 0–8)

## 2019-10-21 ENCOUNTER — CLINICAL SUPPORT (OUTPATIENT)
Dept: FAMILY MEDICINE CLINIC | Age: 64
End: 2019-10-21

## 2019-10-21 DIAGNOSIS — Z23 ENCOUNTER FOR IMMUNIZATION: Primary | ICD-10-CM

## 2019-10-27 PROBLEM — B19.20 DECOMPENSATED CIRRHOSIS RELATED TO HEPATITIS C VIRUS (HCV) (HCC): Status: RESOLVED | Noted: 2017-11-26 | Resolved: 2019-10-27

## 2019-10-27 PROBLEM — Z86.19 HEPATITIS C VIRUS INFECTION CURED AFTER ANTIVIRAL DRUG THERAPY: Status: ACTIVE | Noted: 2017-01-02

## 2019-10-27 PROBLEM — K92.2 GI BLEED: Status: RESOLVED | Noted: 2018-01-08 | Resolved: 2019-10-27

## 2019-10-27 PROBLEM — I85.00 ESOPHAGEAL VARICES (HCC): Status: ACTIVE | Noted: 2017-08-17

## 2019-10-27 PROBLEM — K74.69 DECOMPENSATED CIRRHOSIS RELATED TO HEPATITIS C VIRUS (HCV) (HCC): Status: RESOLVED | Noted: 2017-11-26 | Resolved: 2019-10-27

## 2019-10-27 PROBLEM — K76.6 PORTAL HYPERTENSION (HCC): Status: RESOLVED | Noted: 2017-08-24 | Resolved: 2019-10-27

## 2019-10-28 ENCOUNTER — TELEPHONE (OUTPATIENT)
Dept: HEMATOLOGY | Age: 64
End: 2019-10-28

## 2019-10-28 ENCOUNTER — PATIENT OUTREACH (OUTPATIENT)
Dept: FAMILY MEDICINE CLINIC | Age: 64
End: 2019-10-28

## 2019-10-28 NOTE — TELEPHONE ENCOUNTER
----- Message from Mercedes Maldonado MD sent at 10/27/2019 12:26 PM EDT -----  Note completed. Ask her who Ortho MD is and send note. Schedule for EGD. [facility]    ----- Message -----  From: Chris Cruz LPN  Sent: 87/99/1630  10:03 AM EDT  To: Mercedes Maldonado MD    Sent you an email on this patient. Leatha Gay , 1955 - next follow up with Cheri 01/2020  Diagnosis:  cirrhosis secondary to chronic HCV    Your note is not complete, but you do have listed:  Needs knee surgery. Last EGD 1/2018. Grade 1. Call ortho surgeon. 1+ edema  Needs US. Do I need to schedule her for an EGD asap? Patient has not had an [address] [facility]. I will call her about that. Hepatic Function Panel labs are stable for her. WBC      3.4 - 10.8 x10E3/uL                        2.4Low Panic                     RBC        3.77 - 5.28 x10E6/uL                      3. 07Low                             HGB       11.1 - 15.9 g/dL                               10.6Low                             PLATELET 864 - 882 [id number]                     47

## 2019-10-28 NOTE — PROGRESS NOTES
10/28/19  Danika Sanchez needs clearance erom Dr. Dinh Cordero for a Left knee arthroplasty. NN left a message for Laisha Gable at [employer] with NN [facility] information. NN called Dr Edyta Baldears office to ask about fiancial assistnce for office visits and there was a busy signal on both accounts. MIRELA wicaro send a CC in box message tpo Dr. Seng Gutierrez office to communicate with nurse. Danika Sanchez was tearful and stated that her spouse was dx with cancer and she is the bread winner. She can bearly walk and she needs the arthoplasty. Maranda Bolanos I able to pay half of Dr Priya Galeas' visit fee of $150. She has $75 and she can pay the remainder on a monthly basis. NN will continue to reach put to Dr. Seng Gutierrez nurse. NN will reach out to Feliz Sierra for guidance with other assistance for Dr Toya Vogt visits. NN called Dr Priya Galeas, spoke to [place], [place] left a detailed message with contact information on Dr Toya Vogt Team voice mail.

## 2019-10-28 NOTE — TELEPHONE ENCOUNTER
Faxed last office note to Dr. Harmony Muir office ([facility]), per Dr. Johnathan Garcia.    [phone]

## 2019-11-04 ENCOUNTER — CLINICAL SUPPORT (OUTPATIENT)
Dept: FAMILY MEDICINE CLINIC | Age: 64
End: 2019-11-04

## 2019-11-04 DIAGNOSIS — Z71.9 COUNSELED BY NURSE: Primary | ICD-10-CM

## 2019-11-04 NOTE — Clinical Note
Dr. Jimmy An, patient does not currently have an upcoming appt. With us as PCP. Would you like patient to have an appt. ? If so when? OK to overbook?

## 2019-11-04 NOTE — PROGRESS NOTES
Patient presents to the clinic this afternoon concern about \"Big bruises all over body\" patient is with  and states that she used to get \"little bruises here and there\" but about 2 weeks ago she noted \"big bruises in back and legs\". Discussed case with Dr. Charmayne Catalan provider on site and per Dr. Charmayne Catalan, patient needs to see her liver specialist due to per chart review the bruises could be due to her platelet number. Nurse assisted patient on making follow up with the liver institute. Patient agreed with date, time and place of appt. Patient travels back and forth from [place] to [place] for medical care, per Dr. Charmayne Catalan nurse to suggest for patient to find a clinic closer so that she does not have to drive all the way here. Patient states that she has tried looking but she has had not luck. Routing chart to provider as patient does not have a f/u with the [facility] at this time.  Tramaine Craven RN

## 2019-12-02 ENCOUNTER — PATIENT OUTREACH (OUTPATIENT)
Dept: FAMILY MEDICINE CLINIC | Age: 64
End: 2019-12-02

## 2019-12-02 NOTE — LETTER
12/3/2019 7:38 AM 
 
 
Ms. Willa Bermudez [address] [place] [phone] Por favor de llamar a wayne Justin Marcell al [phone]. Lexie Blum, Merissa Wheeler, [address] [place] [address]

## 2019-12-03 NOTE — PROGRESS NOTES
12/3/19  NN called Mati Gee and left a message with contact NN information. NN mailed letter to home. NN called Dr Shavon Wilde office, opens at 9:00 am. NN will call back.      12/6/19

## 2019-12-06 ENCOUNTER — TELEPHONE (OUTPATIENT)
Dept: HEMATOLOGY | Age: 64
End: 2019-12-06

## 2019-12-06 NOTE — TELEPHONE ENCOUNTER
Nurse Emmy Duckworth called about the pt condition because she worried and was not able to reach the pt via phone or mail. She said she tried to contact the pt so many times but pt phone is never been reach. So she is asking for Dr Val Ruth to call her back regarding any update information of the pt.

## 2019-12-18 ENCOUNTER — TELEPHONE (OUTPATIENT)
Dept: FAMILY MEDICINE CLINIC | Age: 64
End: 2019-12-18

## 2019-12-18 NOTE — TELEPHONE ENCOUNTER
Patient has called and said Dr. Pawan Ramirez gave her a med for her leg pain, she \"doesn't remember what the medicine is and her pharmacy doesn't know\", she stated they are white (small) capsules and say the number 226 on them.  Told her that I will relay the message to CBN and

## 2019-12-18 NOTE — TELEPHONE ENCOUNTER
Patient, Eleni Garcia, said she has lost one of her prescription bottles but she does not know which one is lost.  It is for pain in the legs.     Gregoria Graves April

## 2019-12-23 NOTE — PROGRESS NOTES
Chart reviewed, patient was offered an appt. By front office staff, patient declined, please see tel. Encounter ffrom 12/18/19 for more details.  Max Ley RN

## 2020-01-16 ENCOUNTER — ANESTHESIA (OUTPATIENT)
Dept: ENDOSCOPY | Age: 65
End: 2020-01-16
Payer: SUBSIDIZED

## 2020-01-16 ENCOUNTER — ANESTHESIA EVENT (OUTPATIENT)
Dept: ENDOSCOPY | Age: 65
End: 2020-01-16
Payer: SUBSIDIZED

## 2020-01-16 ENCOUNTER — HOSPITAL ENCOUNTER (OUTPATIENT)
Age: 65
Setting detail: OUTPATIENT SURGERY
Discharge: HOME | End: 2020-01-16
Attending: INTERNAL MEDICINE | Admitting: INTERNAL MEDICINE
Payer: SUBSIDIZED

## 2020-01-16 ENCOUNTER — OFFICE VISIT (OUTPATIENT)
Dept: HEMATOLOGY | Age: 65
End: 2020-01-16

## 2020-01-16 VITALS
HEART RATE: 75 BPM | OXYGEN SATURATION: 98 % | SYSTOLIC BLOOD PRESSURE: 179 MMHG | DIASTOLIC BLOOD PRESSURE: 57 MMHG | TEMPERATURE: 98.8 F

## 2020-01-16 VITALS
SYSTOLIC BLOOD PRESSURE: 130 MMHG | TEMPERATURE: 98.1 F | DIASTOLIC BLOOD PRESSURE: 59 MMHG | RESPIRATION RATE: 13 BRPM | HEART RATE: 77 BPM | OXYGEN SATURATION: 98 %

## 2020-01-16 DIAGNOSIS — I10 ESSENTIAL HYPERTENSION: ICD-10-CM

## 2020-01-16 DIAGNOSIS — K76.6 PORTAL HYPERTENSION (HCC): ICD-10-CM

## 2020-01-16 DIAGNOSIS — R18.8 CIRRHOSIS OF LIVER WITH ASCITES, UNSPECIFIED HEPATIC CIRRHOSIS TYPE (HCC): Primary | ICD-10-CM

## 2020-01-16 DIAGNOSIS — K74.60 CIRRHOSIS OF LIVER WITH ASCITES, UNSPECIFIED HEPATIC CIRRHOSIS TYPE (HCC): Primary | ICD-10-CM

## 2020-01-16 LAB
H PYLORI FROM TISSUE: NEGATIVE
KIT LOT NO., HCLOLOT: NORMAL
NEGATIVE CONTROL: NEGATIVE
POSITIVE CONTROL: POSITIVE

## 2020-01-16 PROCEDURE — 76040000019: Performed by: INTERNAL MEDICINE

## 2020-01-16 PROCEDURE — 87077 CULTURE AEROBIC IDENTIFY: CPT | Performed by: INTERNAL MEDICINE

## 2020-01-16 PROCEDURE — 88342 IMHCHEM/IMCYTCHM 1ST ANTB: CPT

## 2020-01-16 PROCEDURE — 74011000250 HC RX REV CODE- 250: Performed by: NURSE ANESTHETIST, CERTIFIED REGISTERED

## 2020-01-16 PROCEDURE — 77030021593 HC FCPS BIOP ENDOSC BSC -A: Performed by: INTERNAL MEDICINE

## 2020-01-16 PROCEDURE — 76060000031 HC ANESTHESIA FIRST 0.5 HR: Performed by: INTERNAL MEDICINE

## 2020-01-16 PROCEDURE — 74011250636 HC RX REV CODE- 250/636: Performed by: NURSE ANESTHETIST, CERTIFIED REGISTERED

## 2020-01-16 PROCEDURE — 88305 TISSUE EXAM BY PATHOLOGIST: CPT

## 2020-01-16 RX ORDER — NALOXONE HYDROCHLORIDE 0.4 MG/ML
0.4 INJECTION, SOLUTION INTRAMUSCULAR; INTRAVENOUS; SUBCUTANEOUS
Status: DISCONTINUED | OUTPATIENT
Start: 2020-01-16 | End: 2020-01-16 | Stop reason: HOSPADM

## 2020-01-16 RX ORDER — LISINOPRIL 40 MG/1
40 TABLET ORAL DAILY
Qty: 30 TAB | Refills: 0 | Status: SHIPPED | OUTPATIENT
Start: 2020-01-16 | End: 2020-06-01 | Stop reason: SDUPTHER

## 2020-01-16 RX ORDER — SODIUM CHLORIDE 0.9 % (FLUSH) 0.9 %
5-40 SYRINGE (ML) INJECTION AS NEEDED
Status: DISCONTINUED | OUTPATIENT
Start: 2020-01-16 | End: 2020-01-16 | Stop reason: HOSPADM

## 2020-01-16 RX ORDER — SODIUM CHLORIDE 9 MG/ML
INJECTION, SOLUTION INTRAVENOUS
Status: DISCONTINUED | OUTPATIENT
Start: 2020-01-16 | End: 2020-01-16 | Stop reason: HOSPADM

## 2020-01-16 RX ORDER — SODIUM CHLORIDE 0.9 % (FLUSH) 0.9 %
5-40 SYRINGE (ML) INJECTION EVERY 8 HOURS
Status: DISCONTINUED | OUTPATIENT
Start: 2020-01-16 | End: 2020-01-16 | Stop reason: HOSPADM

## 2020-01-16 RX ORDER — FUROSEMIDE 40 MG/1
40 TABLET ORAL DAILY
Qty: 30 TAB | Refills: 11 | Status: SHIPPED | OUTPATIENT
Start: 2020-01-16 | End: 2020-06-01 | Stop reason: SDUPTHER

## 2020-01-16 RX ORDER — FLUMAZENIL 0.1 MG/ML
0.2 INJECTION INTRAVENOUS
Status: DISCONTINUED | OUTPATIENT
Start: 2020-01-16 | End: 2020-01-16 | Stop reason: HOSPADM

## 2020-01-16 RX ORDER — INDOMETHACIN 50 MG/1
CAPSULE ORAL 3 TIMES DAILY
COMMUNITY
End: 2020-06-01

## 2020-01-16 RX ORDER — LIDOCAINE HYDROCHLORIDE 20 MG/ML
INJECTION, SOLUTION EPIDURAL; INFILTRATION; INTRACAUDAL; PERINEURAL AS NEEDED
Status: DISCONTINUED | OUTPATIENT
Start: 2020-01-16 | End: 2020-01-16 | Stop reason: HOSPADM

## 2020-01-16 RX ORDER — SODIUM CHLORIDE 9 MG/ML
50 INJECTION, SOLUTION INTRAVENOUS CONTINUOUS
Status: DISCONTINUED | OUTPATIENT
Start: 2020-01-16 | End: 2020-01-16 | Stop reason: HOSPADM

## 2020-01-16 RX ORDER — PANTOPRAZOLE SODIUM 20 MG/1
20 TABLET, DELAYED RELEASE ORAL DAILY
Qty: 30 TAB | Refills: 3 | Status: SHIPPED | OUTPATIENT
Start: 2020-01-16 | End: 2020-06-01 | Stop reason: SDUPTHER

## 2020-01-16 RX ORDER — PROPOFOL 10 MG/ML
INJECTION, EMULSION INTRAVENOUS AS NEEDED
Status: DISCONTINUED | OUTPATIENT
Start: 2020-01-16 | End: 2020-01-16 | Stop reason: HOSPADM

## 2020-01-16 RX ORDER — FENTANYL CITRATE 50 UG/ML
50-200 INJECTION, SOLUTION INTRAMUSCULAR; INTRAVENOUS
Status: DISCONTINUED | OUTPATIENT
Start: 2020-01-16 | End: 2020-01-16 | Stop reason: HOSPADM

## 2020-01-16 RX ORDER — MIDAZOLAM HYDROCHLORIDE 1 MG/ML
5-10 INJECTION, SOLUTION INTRAMUSCULAR; INTRAVENOUS
Status: DISCONTINUED | OUTPATIENT
Start: 2020-01-16 | End: 2020-01-16 | Stop reason: HOSPADM

## 2020-01-16 RX ORDER — DEXTROMETHORPHAN/PSEUDOEPHED 2.5-7.5/.8
1.2 DROPS ORAL
Status: DISCONTINUED | OUTPATIENT
Start: 2020-01-16 | End: 2020-01-16 | Stop reason: HOSPADM

## 2020-01-16 RX ORDER — EPINEPHRINE 0.1 MG/ML
1 INJECTION INTRACARDIAC; INTRAVENOUS
Status: DISCONTINUED | OUTPATIENT
Start: 2020-01-16 | End: 2020-01-16 | Stop reason: HOSPADM

## 2020-01-16 RX ORDER — ATROPINE SULFATE 0.1 MG/ML
0.5 INJECTION INTRAVENOUS
Status: DISCONTINUED | OUTPATIENT
Start: 2020-01-16 | End: 2020-01-16 | Stop reason: HOSPADM

## 2020-01-16 RX ORDER — SPIRONOLACTONE 100 MG/1
100 TABLET, FILM COATED ORAL DAILY
Qty: 30 TAB | Refills: 11 | Status: SHIPPED | OUTPATIENT
Start: 2020-01-16 | End: 2020-06-01 | Stop reason: SDUPTHER

## 2020-01-16 RX ADMIN — PROPOFOL 40 MG: 10 INJECTION, EMULSION INTRAVENOUS at 14:51

## 2020-01-16 RX ADMIN — PROPOFOL 30 MG: 10 INJECTION, EMULSION INTRAVENOUS at 14:48

## 2020-01-16 RX ADMIN — SODIUM CHLORIDE: 900 INJECTION, SOLUTION INTRAVENOUS at 14:44

## 2020-01-16 RX ADMIN — PROPOFOL 30 MG: 10 INJECTION, EMULSION INTRAVENOUS at 14:54

## 2020-01-16 RX ADMIN — LIDOCAINE HYDROCHLORIDE 80 MG: 20 INJECTION, SOLUTION EPIDURAL; INFILTRATION; INTRACAUDAL; PERINEURAL at 14:45

## 2020-01-16 RX ADMIN — PROPOFOL 100 MG: 10 INJECTION, EMULSION INTRAVENOUS at 14:45

## 2020-01-16 NOTE — PROGRESS NOTES

## 2020-01-16 NOTE — PROCEDURES
[address] [facility], MD, [address], Cite Vandana Grewal, MD Aracelis Miller, MERLY Chavira, [facility]     Valentina Guevara, [facility]   Lynette Sender, MARK Gaston, [facility]       Colt Riddle Olivier De Stearns 136    at [address], [facility] Saul Lozano  22. [phone]    FAX: [address] [place] [address], [address]    [phone]    FAX: [phone]         UPPER ENDOSCOPY PROCEDURE NOTE    Addis Suresh  1955    INDICATION: Cirrhosis. Screening for esophageal varices with variceal ligation if  indicated. : Michelle Avalos MD    SURGICAL ASSISTANT:  None    PROSTHETIC DEVISES, TISSUE GRAFTS, ORGAN TRANSPLANTS:  Not applicable     ANESTHESIA/SEDATION: Sedation per anesthesiology      PROCEDURE DESCRIPTION:  Inf[employer] consent was obtained from the patient for the procedure. All risks and benefits of the procedure explained. The patient was taken to the endoscopy suite and placed in the left lateral decubitus position. Following sequential administration of sedation to doses as indicated above the endoscope was inserted into the mouth and advanced under direct vision to the second portion of the duodenum. Careful inspection of upper gastrointestinal tract was made as the endoscope was inserted and withdrawn. Retroflexion of the endoscope to view of the cardia of the stomach was performed. The findings and interventions are described below. FINDINGS:  Esophagus:    Normal.      Stomach:   No portal hypertensive gastropathy   6 Gastric ulcers with clean bases, no oozing, no visible vessel all located in the antrum  3 of the ulcers were large and deep.   No gastric varices identified. Duodenum:   Normal bulb and second portion    INTERVENTION:   3 biopsies were taken from the the edge of 3 different ulcers. The specimens were placed in preservative and transported to Pathology after the procedure. 1 biopsies were taken from the antrum for BRET test.    COMPLICATIONS: None. The patient tolerated the procedure well. EBL: Negligible. RECOMMENDATIONS:  Observe until discharge parameters are achieved. May resume previous diet. Repeat endoscopy to reassess varices and need for banding in 3 months. Will start [facility] office as scheduled.       Evangelista Lyons MD  [address] [place] [address].  [phone]  [address] [place] [address]

## 2020-01-16 NOTE — PROGRESS NOTES
Ana Chavez is a 59 y.o. female  Chief Complaint   Patient presents with    Follow-up     follow up         Visit Vitals  /57 (BP 1 Location: Left arm, BP Patient Position: Sitting)   Pulse 75   Temp 98.8 °F (37.1 °C) (Tympanic)   SpO2 98%     unable to obtain weight/height , pt reports not being able to walk    pt reports she is out of her blood pressure medicine , no current sx bp in office is 179/57    3 most recent PHQ Screens 1/16/2020   Little interest or pleasure in doing things Several days   Feeling down, depressed, irritable, or hopeless Not at all   Total Score PHQ 2 1     Learning Assessment 1/16/2020   PRIMARY LEARNER Patient   HIGHEST LEVEL OF EDUCATION - PRIMARY LEARNER  GRADUATED HIGH SCHOOL OR GED   BARRIERS PRIMARY LEARNER NONE   CO-LEARNER CAREGIVER No   CO-LEARNER NAME -   Omid Howe -    NEED -   LEARNER PREFERENCE PRIMARY LISTENING   LEARNER [address] -   ANSWERED BY patient   RELATIONSHIP SELF     Abuse Screening Questionnaire 1/16/2020   Do you ever feel afraid of your partner? N   Are you in a relationship with someone who physically or mentally threatens you? N   Is it safe for you to go home? Y         1. Have you been to the ER, urgent care clinic since your last visit? Hospitalized since your last visit? No    2. Have you seen or consulted any other health care providers outside of the [address] [place] [address] since your last visit? Include any pap smears or colon screening.  No
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
## 2020-01-16 NOTE — PROGRESS NOTES
Dr. Aleja Salgado was notified that patient stated she has had some unexplained weight loss, but she was not sure how much

## 2020-01-16 NOTE — DISCHARGE INSTRUCTIONS
Suicide Prevention Hotline 7-490.101.4023 or text [address], MD, [address] [facility], Cite Noris Blake MD Dagoberto Amato, PA-C Claryce Rick, [facility]     Valentina Guevara, [facility]   Adriana Santos FNROMULOC    Cosme Duverney, [facility]       Colt Deputa Olivier De Stearns 136    at [address], [address] [place] [address]    [address] [facility] 22.    [phone]    FAX: [address] [place] [address] Avenue    at [address], [address], [address]    [phone]    FAX: [phone]         ENDOSCOPY DISCHARGE INSTRUCTIONS      Yoel Dickerson  1955  Date: 1/16/2020    DISCOMFORT:  Use lozenges or warm salt water gargle for sore thoat  Apply warm compress to IV site if red. If redness or soreness persists call the office. You may experience gas and bloating. Walking and belching will help relieve this. You may experience chest discomfort or pressure especially if banding of esophageal varices was performed. DIET:  You may resume your normal diet. ACTIVITY:  Spend the remainder of the day resting. Avoid any strenuous activity. You may not operate a vehicle for 12 hours. You may not engage in an occupation involving machinery or appliances for rest of today. Avoid making any critical or financial decisions for at least 24 hour. Call the [facility] [phone] if you have any of the following:  Increasing chest or abdominal pain, nausea, vomiting, vomiting blood, abdominal distension or swelling, fever or chills, bloody discharge from nose or mouth or shortness of breath. Follow-up Instructions:  Call Dr. Rafi Lugo for any questions or problems at the phone number listed above.   If a biopsy was performed, you will be contacted by the office staff or  Maranda within 1 week. If you have not heard from us by then you may call the office at the phone number listed above to inquire about the results. ENDOSCOPY FINDINGS:  Your endoscopy demonstrated 6 ulcers in the stomach  This is from the indocin you are taking for arthritis. A biopsy of the stomach was taken. Will repeat EGD to look for development of varices in 3 months  The office will call to schedule this. DISCHARGE SUMMARY from the Nurse: The following personal items collected during your admission are returned to you:   Dental Appliance: Dental Appliances: None  Vision: Visual Aid: None  Hearing Aid:    Jewelry:    Clothing:    Other Valuables:    Valuables sent to safe:    Patient Education   Patient Education        Gastritis: Instrucciones de cuidado - [ Gastritis: Care Instructions ]  Instrucciones de cuidado    La gastritis es dolor y malestar estomacal. Sucede cuando algo irrita el revestimiento del STEPHEN. Hay muchas cosas que pueden causarla. Entre estas se incluyen lam infección ofelia la gripe o algo que ha comido o bebido. Los medicamentos o lam llaga en el recubrimiento del estómago ([place]) también pueden causarla. Puede tener abotagamiento y dolor abdominal. Podría eructar, vomitar y tener revoltura estomacal.  Usted debería poder aliviar el problema tomando medicamentos. Y magalys vez sería útil cambiar la alimentación. Si la gastritis continúa, tam médico podría recetarle medicamentos. La atención de seguimiento es lam parte clave de tam tratamiento y seguridad. Asegúrese de hacer y acudir a todas las citas, y llame a tam médico si está teniendo problemas. También es lam buena idea saber los resultados de justus exámenes y mantener lam lista de los medicamentos que bony. ¿Cómo puede cuidarse en el hogar? · Si tam médico le recetó antibióticos, tómelos según las indicaciones. No deje de tomarlos por el hecho de sentirse mejor. Debe monika todos los antibióticos hasta terminarlos.   · Sea oneil con los medicamentos. Si tam médico le recetó un medicamento para reducir el ácido estomacal, tómelo según las indicaciones. Llame a tam médico si vi estar teniendo problemas con tam medicamento. · No tome ningún otro medicamento, incluyendo analgésicos (medicamentos para el dolor) de venta izabella, sin consultar con tam médico marta. · Si tam médico le recomienda medicamentos de venta izabella para reducir el ácido estomacal, tales ofelia Pepcid AC, Prilosec, Tagamet HB o Zantac 75, siga las instrucciones de la etiqueta. · Tania abundantes líquidos (los suficientes ofelia para que tam orina sea de color amarillo kia o transparente ofelia el agua) para prevenir la deshidratación. Elija monika agua y otros líquidos gerry sin cafeína. Si tiene [employer], el corazón o el hígado y tiene que Samantha's líquidos, hable con tam médico antes de aumentar tam consumo. · Limite la cantidad de alcohol que samara. · Evite el café, el té, las bebidas de cola, el chocolate y otros alimentos que contengan cafeína. Aumentan el ácido estomacal.  ¿Cuándo debe pedir ayuda? Llame al 911 en cualquier momento que considere que necesita atención de Regina. Por ejemplo, llame si:    · Vomita fernanda o algo parecido a granos de café molido.     · Destiney heces son de color rojizo o muy sanguinolentas (con fernanda).    Llame a tam médico ahora mismo o busque atención médica inmediata si:    · Empieza a respirar en forma acelerada y no ha producido orina en las últimas 8 horas.     · No puede retener líquidos en el estómago.    Preste especial atención a los cambios en tam sreekanth y asegúrese de comunicarse con tam médico si:    · No mejora ofelia se esperaba. ¿Dónde puede encontrar más información en inglés? Kate Bergeret a http://constance-brian.info/. Bry Dayana N012 en la búsqueda para aprender más acerca de \"Gastritis: Instrucciones de cuidado - [ Gastritis: Care Instructions ]. \"  Revisado: 7 noviembre, 2018  Versión del contenido: 12.2  © 6756-8061 Healthwise, Incorporated. Las instrucciones de cuidado fueron adaptadas bajo licencia por [employer] (which disclaims liability or warranty for this information). Si usted tiene [place] afección médica o sobre estas instrucciones, siempre pregunte a tam profesional de sreekanth. Healthwise, Incorporated niega toda garantía o responsabilidad por tam uso de esta información. Gastritis: Care Instructions  Your Care Instructions    Gastritis is a sore and upset stomach. It happens when something irritates the stomach lining. Many things can cause it. These include an infection such as the flu or something you ate or drank. Medicines or a sore on the lining of the stomach (ulcer) also can cause it. Your belly may bloat and ache. You may belch, vomit, and feel sick to your stomach. You should be able to relieve the problem by taking medicine. And it may help to change your diet. If gastritis lasts, your doctor may prescribe medicine. Follow-up care is a key part of your treatment and safety. Be sure to make and go to all appointments, and call your doctor if you are having problems. It's also a good idea to know your test results and keep a list of the medicines you take. How can you care for yourself at home? · If your doctor prescribed antibiotics, take them as directed. Do not stop taking them just because you feel better. You need to take the full course of antibiotics. · Be safe with medicines. If your doctor prescribed medicine to decrease stomach acid, take it as directed. Call your doctor if you think you are having a problem with your medicine. · Do not take any other medicine, including over-the-counter pain relievers, without talking to your doctor first.  · If your doctor recommends over-the-counter medicine to reduce stomach acid, such as Pepcid AC, Prilosec, Tagamet HB, or Zantac 75, follow the directions on the label.   · Drink plenty of fluids (enough so that your urine is light yellow or clear like water) to prevent dehydration. Choose water and other caffeine-free clear liquids. If you have kidney, heart, or liver disease and have to limit fluids, talk with your doctor before you increase the amount of fluids you drink. · Limit how much alcohol you drink. · Avoid coffee, tea, cola drinks, chocolate, and other foods with caffeine. They increase stomach acid. When should you call for help? Call 911 anytime you think you may need emergency care. For example, call if:    · You vomit blood or what looks like coffee grounds.     · You pass maroon or very bloody stools.    Call your doctor now or seek immediate medical care if:    · You start breathing fast and have not produced urine in the last 8 hours.     · You cannot keep fluids down.    Watch closely for changes in your health, and be sure to contact your doctor if:    · You do not get better as expected. Where can you learn more? Go to http://constance-brian.info/. Enter [id number] in the search box to learn more about \"Gastritis: Care Instructions. \"  Current as of: November 7, 2018  Content Version: 12.2  © 8109-0832 [employer]. Care instructions adapted under license by [employer] (which disclaims liability or warranty for this information). If you have questions about a medical condition or this instruction, always ask your healthcare professional. [address] any warranty or liability for your use of this information.

## 2020-01-16 NOTE — ROUTINE PROCESS
Yoel Dickerson 
[birth year] 
[id number] Situation: 
Verbal report received from: Sharon Zapata Procedure: Procedure(s): ESOPHAGOGASTRODUODENOSCOPY (EGD) ESOPHAGOGASTRODUODENAL (EGD) BIOPSY Background: 
 
Preoperative diagnosis: CIRRHOSIS Postoperative diagnosis: 1. - Gastritis 2. - Gastric Ulcers :  Dr. Rafi Lugo Assistant(s): Endoscopy Technician-1: Tyler Goodwin 
Endoscopy RN-1: Genesis Bartlett RN Endoscopy RN-2: Rosalind Staley RN Specimens:  
ID Type Source Tests Collected by Time Destination 1 : Gastric Ulcer 1 Preservative   Blaze Forbes MD 1/16/2020 1452 Pathology 2 : Gastric Ulcer 2 Preservative   Blaze Forbes MD 1/16/2020 1453 Pathology 3 : Gastric Ulcer 3 Preservative   Blaze Forbes MD 1/16/2020 1455 Pathology H. Pylori Assessment: 
Intra-procedure medications Anesthesia gave intra-procedure sedation and medications, see anesthesia flow sheet yes Intravenous fluids: NS@ Angulo Comes Vital signs stable yes Abdominal assessment: round and soft yes Recommendation: 
Discharge patient per MD order yes Return to floor Family or Friend  Permission to share finding with family or friend yes
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
## 2020-01-16 NOTE — ANESTHESIA PREPROCEDURE EVALUATION
Relevant Problems   No relevant active problems       Anesthetic History   No history of anesthetic complications            Review of Systems / Medical History  Patient summary reviewed, nursing notes reviewed and pertinent labs reviewed    Pulmonary  Within defined limits                 Neuro/Psych   Within defined limits           Cardiovascular    Hypertension: well controlled              Exercise tolerance: >4 METS     GI/Hepatic/Renal           Liver disease     Endo/Other        Obesity and arthritis     Other Findings              Physical Exam    Airway  Mallampati: I  TM Distance: > 6 cm  Neck ROM: normal range of motion   Mouth opening: Normal     Cardiovascular    Rhythm: regular  Rate: normal         Dental  No notable dental hx       Pulmonary  Breath sounds clear to auscultation               Abdominal         Other Findings            Anesthetic Plan    ASA: 3  Anesthesia type: MAC          Induction: Intravenous  Anesthetic plan and risks discussed with: Patient

## 2020-01-16 NOTE — PROGRESS NOTES
Patient answered yes to first question on C-SSRS screening but no to the other questions. Put suicide prevention numbers on her discharge instructions.

## 2020-01-16 NOTE — PERIOP NOTES
Based on recovery nurse concerns, I spoke to the patient reguarding her feelings of being overwhelmed, answering the c-ssrs question about wanting to go to sleep and not wake up. The patient stated she does NOT have a plan and wants to live. She is just feeling overwhelmed with life stressors. Both the recovery nurse and I encouraged her to go to the ER, she politely declined. She stated she had a friend she can call to help. She stated she did not have a plan or any intention of harming herself. The crisis hotline was provided, encouraged her to seek out resources in her home town and shared with her the daily planet as a possible resource.

## 2020-01-16 NOTE — ANESTHESIA POSTPROCEDURE EVALUATION
Post-Anesthesia Evaluation and Assessment    Patient: Narcisa Emerson MRN: [id number]  SSN: [id number]    YOB: 1955  Age: 59 y.o. Sex: female      I have evaluated the patient and they are stable and ready for discharge from the PACU. Cardiovascular Function/Vital Signs  Visit Vitals  /48   Pulse 74   Temp 36.7 °C (98.1 °F)   Resp 14   SpO2 96%   Breastfeeding No       Patient is status post MAC anesthesia for Procedure(s):  ESOPHAGOGASTRODUODENOSCOPY (EGD)  ESOPHAGOGASTRODUODENAL (EGD) BIOPSY. Nausea/Vomiting: None    Postoperative hydration reviewed and adequate. Pain:  Pain Scale 1: Numeric (0 - 10) (01/16/20 1507)  Pain Intensity 1: 0 (01/16/20 1507)   Managed    Neurological Status: At baseline    Mental Status, Level of Consciousness: Alert and  oriented to person, place, and time    Pulmonary Status:   O2 Device: Room air (01/16/20 1507)   Adequate oxygenation and airway patent    Complications related to anesthesia: None    Post-anesthesia assessment completed. No concerns    Signed By: Melly Godoy MD     January 16, 2020              Procedure(s):  ESOPHAGOGASTRODUODENOSCOPY (EGD)  ESOPHAGOGASTRODUODENAL (EGD) BIOPSY.     MAC    <BSHSIANPOST>    Vitals Value Taken Time   /48 1/16/2020  3:07 PM   Temp     Pulse 74 1/16/2020  3:07 PM   Resp 14 1/16/2020  3:07 PM   SpO2 96 % 1/16/2020  3:07 PM

## 2020-01-17 LAB
ALBUMIN SERPL-MCNC: 3.1 G/DL (ref 3.6–4.8)
ALP SERPL-CCNC: 107 IU/L (ref 39–117)
ALT SERPL-CCNC: 13 IU/L (ref 0–32)
AST SERPL-CCNC: 17 IU/L (ref 0–40)
BILIRUB DIRECT SERPL-MCNC: 0.58 MG/DL (ref 0–0.4)
BILIRUB SERPL-MCNC: 1.4 MG/DL (ref 0–1.2)
BUN SERPL-MCNC: 13 MG/DL (ref 8–27)
BUN/CREAT SERPL: 32 (ref 12–28)
CALCIUM SERPL-MCNC: 8.6 MG/DL (ref 8.7–10.3)
CHLORIDE SERPL-SCNC: 109 MMOL/L (ref 96–106)
CO2 SERPL-SCNC: 21 MMOL/L (ref 20–29)
CREAT SERPL-MCNC: 0.41 MG/DL (ref 0.57–1)
FERRITIN SERPL-MCNC: 91 NG/ML (ref 15–150)
GLUCOSE SERPL-MCNC: 79 MG/DL (ref 65–99)
INR PPP: 1.2 (ref 0.8–1.2)
IRON SATN MFR SERPL: 22 % (ref 15–55)
IRON SERPL-MCNC: 68 UG/DL (ref 27–139)
POTASSIUM SERPL-SCNC: 3.9 MMOL/L (ref 3.5–5.2)
PROTHROMBIN TIME: 12.2 SEC (ref 9.1–12)
SODIUM SERPL-SCNC: 142 MMOL/L (ref 134–144)
TIBC SERPL-MCNC: 308 UG/DL (ref 250–450)
UIBC SERPL-MCNC: 240 UG/DL (ref 118–369)

## 2020-01-17 NOTE — PERIOP NOTES
Followed up with phone call to check on patient after procedure yesterday  She is complaining of Shortness of breath and Chest pain. Asked and begged her to please call 911    At least 5 times.

## 2020-01-18 LAB — HCV RNA SERPL QL NAA+PROBE: NEGATIVE

## 2020-01-20 LAB
AFP L3 MFR SERPL: NORMAL % (ref 0–9.9)
AFP SERPL-MCNC: 3.2 NG/ML (ref 0–8)

## 2020-01-21 LAB
ERYTHROCYTE [DISTWIDTH] IN BLOOD BY AUTOMATED COUNT: 14.5 % (ref 11.7–15.4)
HCT VFR BLD AUTO: 28.1 % (ref 34–46.6)
HGB BLD-MCNC: 9.3 G/DL (ref 11.1–15.9)
MCH RBC QN AUTO: 32.4 PG (ref 26.6–33)
MCHC RBC AUTO-ENTMCNC: 33.1 G/DL (ref 31.5–35.7)
MCV RBC AUTO: 98 FL (ref 79–97)
MORPHOLOGY BLD-IMP: ABNORMAL
PLATELET # BLD AUTO: 72 X10E3/UL (ref 150–450)
RBC # BLD AUTO: 2.87 X10E6/UL (ref 3.77–5.28)
WBC # BLD AUTO: 2.7 X10E3/UL (ref 3.4–10.8)

## 2020-02-14 ENCOUNTER — HOSPITAL ENCOUNTER (OUTPATIENT)
Dept: ULTRASOUND IMAGING | Age: 65
Discharge: HOME | End: 2020-02-14
Attending: INTERNAL MEDICINE
Payer: SUBSIDIZED

## 2020-02-14 DIAGNOSIS — K74.60 CIRRHOSIS OF LIVER WITHOUT ASCITES, UNSPECIFIED HEPATIC CIRRHOSIS TYPE (HCC): ICD-10-CM

## 2020-02-14 PROCEDURE — 76700 US EXAM ABDOM COMPLETE: CPT

## 2020-04-09 ENCOUNTER — TELEPHONE (OUTPATIENT)
Dept: FAMILY MEDICINE CLINIC | Age: 65
End: 2020-04-09

## 2020-04-09 NOTE — TELEPHONE ENCOUNTER
Tc received from the front office the pt had called and asked for this nurse to call her. The registrar said she thought she missed the nurses call. The chart was reviewed. There was no recent phone calls made to the pt noted. The pt was called. No answer. A message was left for her to contact the [facility] office. The mobile # was called. It was an insurance co. Her emergency contact was called. No answer and the VM box is full. No opportunity to leave a message.  Suni Hayes RN

## 2020-06-01 ENCOUNTER — TELEPHONE (OUTPATIENT)
Dept: FAMILY MEDICINE CLINIC | Age: 65
End: 2020-06-01

## 2020-06-01 ENCOUNTER — OFFICE VISIT (OUTPATIENT)
Dept: FAMILY MEDICINE CLINIC | Age: 65
End: 2020-06-01

## 2020-06-01 DIAGNOSIS — K74.60 CIRRHOSIS OF LIVER WITH ASCITES, UNSPECIFIED HEPATIC CIRRHOSIS TYPE (HCC): ICD-10-CM

## 2020-06-01 DIAGNOSIS — R18.8 CIRRHOSIS OF LIVER WITH ASCITES, UNSPECIFIED HEPATIC CIRRHOSIS TYPE (HCC): ICD-10-CM

## 2020-06-01 DIAGNOSIS — G89.29 CHRONIC PAIN OF BOTH KNEES: ICD-10-CM

## 2020-06-01 DIAGNOSIS — R18.8 CIRRHOSIS OF LIVER WITH ASCITES, UNSPECIFIED HEPATIC CIRRHOSIS TYPE (HCC): Primary | ICD-10-CM

## 2020-06-01 DIAGNOSIS — K76.6 PORTAL HYPERTENSION (HCC): ICD-10-CM

## 2020-06-01 DIAGNOSIS — M25.50 ARTHRALGIA, UNSPECIFIED JOINT: ICD-10-CM

## 2020-06-01 DIAGNOSIS — I10 ESSENTIAL HYPERTENSION: ICD-10-CM

## 2020-06-01 DIAGNOSIS — K74.60 CIRRHOSIS OF LIVER WITH ASCITES, UNSPECIFIED HEPATIC CIRRHOSIS TYPE (HCC): Primary | ICD-10-CM

## 2020-06-01 DIAGNOSIS — M25.562 CHRONIC PAIN OF BOTH KNEES: ICD-10-CM

## 2020-06-01 DIAGNOSIS — M25.561 CHRONIC PAIN OF BOTH KNEES: ICD-10-CM

## 2020-06-01 RX ORDER — DICLOFENAC SODIUM 10 MG/G
GEL TOPICAL
Qty: 100 G | Refills: 1 | Status: SHIPPED | OUTPATIENT
Start: 2020-06-01

## 2020-06-01 RX ORDER — LISINOPRIL 40 MG/1
40 TABLET ORAL DAILY
Qty: 30 TAB | Refills: 3 | Status: SHIPPED | OUTPATIENT
Start: 2020-06-01

## 2020-06-01 RX ORDER — FUROSEMIDE 40 MG/1
40 TABLET ORAL DAILY
Qty: 30 TAB | Refills: 3 | Status: SHIPPED | OUTPATIENT
Start: 2020-06-01

## 2020-06-01 RX ORDER — PANTOPRAZOLE SODIUM 20 MG/1
20 TABLET, DELAYED RELEASE ORAL DAILY
Qty: 30 TAB | Refills: 3 | Status: SHIPPED | OUTPATIENT
Start: 2020-06-01

## 2020-06-01 RX ORDER — SPIRONOLACTONE 100 MG/1
100 TABLET, FILM COATED ORAL DAILY
Qty: 30 TAB | Refills: 3 | Status: SHIPPED | OUTPATIENT
Start: 2020-06-01

## 2020-06-01 NOTE — TELEPHONE ENCOUNTER
Per Dr. Dorado Has: please have patient check daily weights. Xochitl [place] a dry weight or the weight that would be ideal for the patient where we know the shortness of breath would be minimal.  Increase furosemide to twice a day for a few days until she meets a weight at which she is no longer short of breath. Recommend patient to decrease salt to the minimum as this is causing her to accumulate unnecessary fluid, making her shortness of breath worse. For her knee she may use ice. I also sent in a rx for topical voltaren 2 gram every 8 hours as needed. She is to do all of these things and may have a follow up appointment with Dr. Dorado Has next week.

## 2020-06-01 NOTE — PROGRESS NOTES
Chula Connolly is a 72 y.o. female evaluated via telephone at [phone] on 6/1/2020. Patient identification verified with 2 identifiers. She refused ER. Consent: She and/or health care decision maker has provided verbal consent to proceed: Yes Pursuant to the emergency declaration under the [facility], [address] [place] [address] and the [employer] Act, this Virtual Telephone Visit was conducted to reduce the patient's risk of exposure to COVID-19. : Kit Vásquez  Chief Complaint   Patient presents with    Medication Refill     has been without medication for the last 4 months     Shortness of Breath     per patient she feels she needs medication for asthma, she was dx with asthma in the past.    Knee Pain     umm. 10/10 pain     HPI HGB 9.3 and very low platelets when last checked in January 2020. She was sent from the [facility] to see Dr. Ashkan Gonzalez. He's from the [employer] [facility]. Says her doctor is from the [employer]. Also Marina Samuel. She is having shortness of breath, when she is doing something or when she is lying down. States very little swelling of the legs. Shortness of breath has been more intense in the last 2 weeks. She has had chills. She and her  had negative testing for COVID-19 3 months ago. Documentation:  I communicated with the patient and/or health care decision maker about:  Diagnoses and all orders for this visit:    1. Essential hypertension  -     lisinopriL (PRINIVIL, ZESTRIL) 40 mg tablet; Take 1 Tab by mouth daily. For blood pressure. Laurel 1 tableta lam vez al calvin para presion    2. Portal hypertension (HCC)  -     furosemide (LASIX) 40 mg tablet; Take 1 Tab by mouth daily. Laurel 1 tableta lam vez al calvin  -     spironolactone (ALDACTONE) 100 mg tablet; Take 1 Tab by mouth daily. Laurel 1 tableta alm vez al calvin    3.  Cirrhosis of liver with ascites, unspecified hepatic cirrhosis type (HCC)  -     furosemide (LASIX) 40 mg tablet; Take 1 Tab by mouth daily. Hamel 1 tableta lam vez al calvin  -     spironolactone (ALDACTONE) 100 mg tablet; Take 1 Tab by mouth daily. Hamel 1 tableta lam vez al calvin    Other orders  -     pantoprazole (Protonix) 20 mg tablet; Take 1 Tab by mouth daily. Indications: a stomach ulcer        Details of this discussion including any medical advice provided: I expressed my concerns that her shortness of breath could be due to (1) heart failure (and being off medications for that x 4 months) or (2) anemia, which was severe in January 2020 and has not been checked since (along with low platelets) and (3) she mentioned shortness of breath and \"chills\" which would put COVID-19 on the list of possibilities. States can't take Tylenol for pain due to bleeding - and I won't use any NSAIDS due to risk of bleeding. She needs to get back with the ortho regarding pain management for that, but first needs stabilization of her chronic health issues. I stated if she does not improve with shortness of breath and leg swelling (\"some\") once she is back on her medications, she needs to go to the ED; for pain management of her knees, she also needs to go to the ED. Says she's been to the ED for her knee pain and they \"won't do anything\". Discussed need to stabilize primary care concerns and then to see specialist for her knees. I sent chart note to Dr. Sofia Friedman for advisement. Total Time: minutes: 21-30 minutes  I affirm this is a Patient Initiated Episode with a Patient who has not had a related appointment within my department in the past 7 days or scheduled within the next 24 hours. JAMIL Yu expressed understanding and agreed to this plan.

## 2020-06-01 NOTE — PROGRESS NOTES
Coordination of Care  1. Have you been to the ER, urgent care clinic since your last visit? Hospitalized since your last visit? No    2. Have you seen or consulted any other health care providers outside of the [address] [place] [address] since your last visit? Include any pap smears or colon screening. No    Does the patient need refills? YES    Learning Assessment Complete? yes  Depression Screening complete in the past 12 months? yes    2:45 PM  Check-out Note:  I expressed my concerns that her shortness of breath could be due to (1) heart failure (and being off medications for that x 4 months) or (2) anemia, which was severe in 2020 and has not been checked since (along with low platelets) and (3) she mentioned shortness of breath and \"chills\" which would put COVID-19 on the list of possibilities.  States can't take Tylenol for pain due to bleeding - and I won't use any NSAIDS due to risk of bleeding.  She needs to get back with the ortho regarding pain management for that, but first needs stabilization of her chronic health issues.  I stated if she does not improve with shortness of breath and leg swelling (\"some\") once she is back on her medications, she needs to go to the ED; for pain management of her knees, she also needs to go to the ED. Cande Orf she's been to the ED for her knee pain and they \"won't do anything\".  Discussed need to stabilize primary care concerns and then to see specialist for her knees.  I recommend follow up with Dr. Santosh Martinez soon     Discharge nurse encounter completed via telephoned call. Name and  verified. AVS, prescription and pharmacy location reviewed . [employer] coupon codes sent via text per patient's request. Registration has provided patient with an appt. For Thursday with Dr. Frost December. Nurse offered to call 911 on behalf of patient, patient stated that she is going to call  who is at work and is planning to go to the ED today.  This has been fully explained to the patient, who indicates understanding and agrees with plan. No further questions at this time.  Isaac Montelongo RN

## 2020-06-01 NOTE — Clinical Note
I expressed my concerns that her shortness of breath could be due to (1) heart failure (and being off medications for that x 4 months) or (2) anemia, which was severe in January 2020 and has not been checked since (along with low platelets) and (3) she mentioned shortness of breath and \"chills\" which would put COVID-19 on the list of possibilities. States can't take Tylenol for pain due to bleeding - and I won't use any NSAIDS due to risk of bleeding. She needs to get back with the ortho regarding pain management for that, but first needs stabilization of her chronic health issues. I stated if she does not improve with shortness of breath and leg swelling (\"some\") once she is back on her medications, she needs to go to the ED; for pain management of her knees, she also needs to go to the ED. Says she's been to the ED for her knee pain and they \"won't do anything\". I recommend follow up with Dr. Frost December soon. How soon, and do you recommend any urgent labs?   Thanks, Denisse Hernández

## 2020-06-01 NOTE — LETTER
6/3/2020 1:09 PM 
 
Ms. Eleni Bermudez   [address] [place] [id number] Dear Ms Marycruz Whitten, Here is a message from your provider; 
 
Per Dr. Judi Ramirez: please check your daily weights. Felix Pedroza a dry weight or the weight that would be ideal for you where we know the shortness of breath would be minimal.  Increase furosemide to twice a day for a few days until you meet a weight at which you are no longer short of breath. I Recommend you to decrease salt to the minimum as this is causing you to accumulate unnecessary fluid, making your shortness of breath worse. For your knee you may use ice. I also sent in a prescription for topical voltaren cream, 2 gram every 8 hours as needed. You can do all of these things and you may have a follow up appointment with Dr. Judi Ramirez next week. Please call the [facility] office for an appt at [phone]. Sincerely, Raz Cain NP

## 2020-06-03 NOTE — TELEPHONE ENCOUNTER
Int # [id number]. Tc to the pt 2x. No answer. left a message for the pt to contact the [facility] office with a message from the provider. A letter was printed and mailed to the pt with the message from the provider and asking the pt to contact the [facility] office. This message was routed to the provider and the [facility] and the [address] [place] [address] for assistance scheduling the pt an appt with Dr Alanis Krishnamurthy next week.    Yazmin Stacy RN

## 2020-06-04 ENCOUNTER — OFFICE VISIT (OUTPATIENT)
Dept: FAMILY MEDICINE CLINIC | Age: 65
End: 2020-06-04

## 2020-06-04 DIAGNOSIS — M25.561 CHRONIC PAIN OF BOTH KNEES: ICD-10-CM

## 2020-06-04 DIAGNOSIS — G89.29 CHRONIC PAIN OF BOTH KNEES: ICD-10-CM

## 2020-06-04 DIAGNOSIS — R18.8 CIRRHOSIS OF LIVER WITH ASCITES, UNSPECIFIED HEPATIC CIRRHOSIS TYPE (HCC): Primary | ICD-10-CM

## 2020-06-04 DIAGNOSIS — M25.50 ARTHRALGIA, UNSPECIFIED JOINT: ICD-10-CM

## 2020-06-04 DIAGNOSIS — M25.562 CHRONIC PAIN OF BOTH KNEES: ICD-10-CM

## 2020-06-04 DIAGNOSIS — K74.60 CIRRHOSIS OF LIVER WITH ASCITES, UNSPECIFIED HEPATIC CIRRHOSIS TYPE (HCC): Primary | ICD-10-CM

## 2020-06-04 NOTE — PROGRESS NOTES
Coordination of Care  1. Have you been to the ER, urgent care clinic since your last visit? Hospitalized since your last visit? No    2. Have you seen or consulted any other health care providers outside of the [address] [place] [address] since your last visit? Include any pap smears or colon screening. No    Does the patient need refills? YES    Learning Assessment Complete?  yes  Depression Screening complete in the past 12 months? yes

## 2020-06-04 NOTE — PROGRESS NOTES
HISTORY OF PRESENT ILLNESS  Maranda Casper is a 72 y.o. female. HPI  Patient states she feels a little weak,  She is feeling better with the medications prescribed from [facility]. She does feel short of breath. Her abdomen is distended from the ascites. She states she has no way of weighing herself and can barely walk. She had used diclofenac gel but is not helping. She would like to know if she can repeat injections to the knee. She had them done about 2 years ago and it helped. ROS    Physical Exam    ASSESSMENT and PLAN  Diagnoses and all orders for this visit:    1. Cirrhosis of liver with ascites, unspecified hepatic cirrhosis type ([address]    2. Chronic pain of both knees    3. Arthralgia, unspecified joint      Patient advise that if her ascites gets worse, she needs to go to the ED and a paracentesis may need to be considered.   We will bring in a few weeks for knee joint pain in person  Follow up as needed

## 2020-06-15 ENCOUNTER — TELEPHONE (OUTPATIENT)
Dept: FAMILY MEDICINE CLINIC | Age: 65
End: 2020-06-15

## 2020-06-15 NOTE — TELEPHONE ENCOUNTER
P/C to [address] Duarte to reschedule the June 22nd appointment at the [facility] with Dr. John Ralph due to COVID-19. Per original [place] appointment's note, the appointment was reschedule as a face to face appointment at [facility] for Wednesday, June 24, 2020 01:30 PM.    [address] verbally authorized to send the appointment information to her  phone because her phone is not working properly. I sent the appointment information to both phone numbers. Patient verbalized understanding and has no further questions.     Prashant De La Garza

## 2020-06-24 ENCOUNTER — OFFICE VISIT (OUTPATIENT)
Dept: FAMILY MEDICINE CLINIC | Age: 65
End: 2020-06-24

## 2020-06-24 VITALS
HEART RATE: 76 BPM | BODY MASS INDEX: 35.86 KG/M2 | DIASTOLIC BLOOD PRESSURE: 62 MMHG | TEMPERATURE: 98.6 F | WEIGHT: 183.6 LBS | OXYGEN SATURATION: 96 % | SYSTOLIC BLOOD PRESSURE: 171 MMHG

## 2020-06-24 DIAGNOSIS — M17.0 OSTEOARTHRITIS OF BOTH KNEES, UNSPECIFIED OSTEOARTHRITIS TYPE: Primary | ICD-10-CM

## 2020-06-24 NOTE — PROGRESS NOTES
HISTORY OF PRESENT ILLNESS  Maranda Solis is a 72 y.o. female. HPI  Patient has severe Osteoarthritis of both knees and would like a steroid injection  ROS  Bilateral knee pain  /62 (BP 1 Location: Right arm, BP Patient Position: Sitting)   Pulse 76   Temp 98.6 °F (37 °C) (Oral)   Wt 183 lb 9.6 oz (83.3 kg)   LMP 06/01/1985 (Approximate)   SpO2 96%   BMI 35.86 kg/m²     Physical Exam  Constitutional:       Appearance: Normal appearance. HENT:      Head: Normocephalic. Cardiovascular:      Rate and Rhythm: Normal rate and regular rhythm. Pulses: Normal pulses. Heart sounds: Normal heart sounds. No murmur. Pulmonary:      Effort: Pulmonary effort is normal. No respiratory distress. Breath sounds: Normal breath sounds. No stridor. No wheezing or rhonchi. Abdominal:      General: Bowel sounds are normal. There is no distension. Palpations: Abdomen is soft. Tenderness: There is no abdominal tenderness. Musculoskeletal:         General: Swelling, tenderness and deformity present. Comments: Limited ROM severe deformity of knees bilaterally   Skin:     General: Skin is warm. Neurological:      Mental Status: She is alert. ASSESSMENT and PLAN  Diagnoses and all orders for this visit:    1.  Osteoarthritis of both knees, unspecified osteoarthritis type      Procedure: 40 mg kenalog in 2 mL 1% lidocaine injected to knee joints, patient tolerated the procedure well

## 2020-09-14 ENCOUNTER — TELEPHONE (OUTPATIENT)
Dept: FAMILY MEDICINE CLINIC | Age: 65
End: 2020-09-14

## 2020-09-14 NOTE — TELEPHONE ENCOUNTER
Phone call from ERLIN at [facility] in [place] asking if we had any records on patient, Stevo Ricardo  since 9/20/18. I returned her call and asked her to fax request.  She said she did but we only sent her records up to 9/20/18. I asked her to fax request again. She said she would.     Lyn Arenas April

## 2020-10-22 ENCOUNTER — TELEPHONE (OUTPATIENT)
Dept: HEMATOLOGY | Age: 65
End: 2020-10-22

## 2020-10-22 NOTE — TELEPHONE ENCOUNTER
Nurse called to ask for records she can't see in [address]. Patient is scheduled in November. Faxed most recent labs and EGD biopsy pathology to [phone].

## 2021-03-23 ENCOUNTER — VIRTUAL VISIT (OUTPATIENT)
Dept: FAMILY MEDICINE CLINIC | Age: 66
End: 2021-03-23

## 2021-03-23 DIAGNOSIS — Z71.89 COUNSELING AND COORDINATION OF CARE: Primary | ICD-10-CM

## 2021-03-23 PROCEDURE — 99080 SPECIAL REPORTS OR FORMS: CPT | Performed by: PHYSICIAN ASSISTANT

## 2021-03-23 NOTE — PROGRESS NOTES
OW called patient at the time of appointment and was not able to speak with patient. OW left a vm asking to call back.

## 2021-04-05 ENCOUNTER — TELEPHONE (OUTPATIENT)
Dept: FAMILY MEDICINE CLINIC | Age: 66
End: 2021-04-05

## 2021-04-05 NOTE — TELEPHONE ENCOUNTER
Returned the pt's call from last week. The pt had called for SW for assistance with Medicare paperwork. She had someone call her back and they are helping. Closing this encounter.  Triny Yanez RN

## 2021-06-10 ENCOUNTER — VIRTUAL VISIT (OUTPATIENT)
Dept: FAMILY MEDICINE CLINIC | Age: 66
End: 2021-06-10

## 2021-06-10 DIAGNOSIS — Z71.89 COUNSELING AND COORDINATION OF CARE: Primary | ICD-10-CM

## 2021-06-10 PROCEDURE — 99080 SPECIAL REPORTS OR FORMS: CPT | Performed by: PHYSICIAN ASSISTANT

## 2021-06-10 NOTE — PROGRESS NOTES
FRANCISCO LE called patient and assisted with Medicaid information. OW provided phone number and web page where patient can call and apply. Patient verbalized understanding.

## 2021-07-06 ENCOUNTER — TELEPHONE (OUTPATIENT)
Dept: FAMILY MEDICINE CLINIC | Age: 66
End: 2021-07-06

## 2021-07-08 NOTE — TELEPHONE ENCOUNTER
AMN Int # [id number]. Tc to the pt. No answer. A message was left to call the [facility]BN for a message. Tc made to the pt's listed mobile #. No answer. A message was left to contact the [facility].   Melissa Pedro RN

## 2022-03-18 PROBLEM — N93.9 VAGINAL BLEEDING: Status: ACTIVE | Noted: 2017-11-26

## 2022-03-18 PROBLEM — I10 HYPERTENSION GOAL BP (BLOOD PRESSURE) < 140/90: Status: ACTIVE | Noted: 2017-08-24

## 2022-03-19 PROBLEM — D62 ACUTE POST-HEMORRHAGIC ANEMIA: Status: ACTIVE | Noted: 2017-11-26

## 2022-03-19 PROBLEM — I85.00 ESOPHAGEAL VARICES (HCC): Status: ACTIVE | Noted: 2017-08-17

## 2022-03-19 PROBLEM — R18.8 ASCITES: Status: ACTIVE | Noted: 2017-04-10

## 2022-03-19 PROBLEM — R60.0 PERIPHERAL EDEMA: Status: ACTIVE | Noted: 2017-11-26

## 2022-03-19 PROBLEM — D64.9 SYMPTOMATIC ANEMIA: Status: ACTIVE | Noted: 2017-08-17

## 2022-03-19 PROBLEM — E66.01 OBESITY, MORBID (HCC): Status: ACTIVE | Noted: 2018-03-09

## 2022-03-19 PROBLEM — M25.561 CHRONIC PAIN OF BOTH KNEES: Status: ACTIVE | Noted: 2018-12-24

## 2022-03-19 PROBLEM — R60.9 PERIPHERAL EDEMA: Status: ACTIVE | Noted: 2017-11-26

## 2022-03-19 PROBLEM — M25.562 CHRONIC PAIN OF BOTH KNEES: Status: ACTIVE | Noted: 2018-12-24

## 2022-03-19 PROBLEM — Z86.19 HEPATITIS C VIRUS INFECTION CURED AFTER ANTIVIRAL DRUG THERAPY: Status: ACTIVE | Noted: 2017-01-02

## 2022-03-19 PROBLEM — K74.60 CIRRHOSIS (HCC): Status: ACTIVE | Noted: 2017-04-10

## 2022-03-19 PROBLEM — G89.29 CHRONIC PAIN OF BOTH KNEES: Status: ACTIVE | Noted: 2018-12-24

## 2023-02-13 ENCOUNTER — TELEPHONE (OUTPATIENT)
Dept: HEMATOLOGY | Age: 68
End: 2023-02-13

## 2023-02-13 NOTE — TELEPHONE ENCOUNTER
[email or web address] Spoke w/patient concerning message from below. Last office visit was about 3 yrs ago. Scheduled appt 3/2023. Discussed w/Cheri patient will need to seek medical attention. Ask patient if she has spoke with her PCP concerning vomiting--no PCP too old per patient and she went to [facility] and they some medication which made her sick so she hasn't went back. Patient lives in [place] and should seek medical attention --patient verbalize understanding and  says she will go tomorrow. (KF)

## 2023-02-13 NOTE — TELEPHONE ENCOUNTER
Patient called because she's feel very tried, vomiting a lot. If she eats more than 2-3 bites she vomits.   She wants to know what she needs to do

## 2023-05-22 RX ORDER — PANTOPRAZOLE SODIUM 20 MG/1
20 TABLET, DELAYED RELEASE ORAL DAILY
COMMUNITY
Start: 2020-06-01

## 2023-05-22 RX ORDER — LISINOPRIL 40 MG/1
40 TABLET ORAL DAILY
COMMUNITY
Start: 2020-06-01

## 2023-05-22 RX ORDER — SPIRONOLACTONE 100 MG/1
100 TABLET, FILM COATED ORAL DAILY
COMMUNITY
Start: 2020-06-01

## 2023-05-22 RX ORDER — FUROSEMIDE 40 MG/1
40 TABLET ORAL DAILY
COMMUNITY
Start: 2020-06-01

## 2023-05-25 NOTE — TELEPHONE ENCOUNTER
Called the pt to r/s 8.8.2018 appt as the provider will be GENEVA Martin and sent a letter Niacinamide Pregnancy And Lactation Text: These medications are considered safe during pregnancy.

## 2023-07-24 ENCOUNTER — HOSPITAL ENCOUNTER (EMERGENCY)
Facility: HOSPITAL | Age: 68
Discharge: HOME | End: 2023-07-27
Payer: MEDICARE

## 2023-07-24 ENCOUNTER — HOSPITAL ENCOUNTER (EMERGENCY)
Facility: HOSPITAL | Age: 68
Discharge: HOME | End: 2023-07-24
Attending: EMERGENCY MEDICINE | Admitting: EMERGENCY MEDICINE
Payer: MEDICARE

## 2023-07-24 DIAGNOSIS — M25.561 CHRONIC PAIN OF BOTH KNEES: Primary | ICD-10-CM

## 2023-07-24 DIAGNOSIS — R06.02 SHORTNESS OF BREATH: ICD-10-CM

## 2023-07-24 DIAGNOSIS — G89.29 CHRONIC PAIN OF BOTH KNEES: Primary | ICD-10-CM

## 2023-07-24 DIAGNOSIS — K74.60 HEPATIC CIRRHOSIS, UNSPECIFIED HEPATIC CIRRHOSIS TYPE, UNSPECIFIED WHETHER ASCITES PRESENT (HCC): ICD-10-CM

## 2023-07-24 DIAGNOSIS — M25.562 CHRONIC PAIN OF BOTH KNEES: Primary | ICD-10-CM

## 2023-07-24 DIAGNOSIS — I10 ASYMPTOMATIC HYPERTENSION: ICD-10-CM

## 2023-07-24 LAB
ALBUMIN SERPL-MCNC: 2.9 G/DL (ref 3.5–5)
ALBUMIN/GLOB SERPL: 0.8 (ref 1.1–2.2)
ALP SERPL-CCNC: 181 U/L (ref 45–117)
ALT SERPL-CCNC: 33 U/L (ref 12–78)
ANION GAP SERPL CALC-SCNC: 4 MMOL/L (ref 5–15)
AST SERPL-CCNC: 52 U/L (ref 15–37)
BASOPHILS # BLD: 0 K/UL (ref 0–0.1)
BASOPHILS NFR BLD: 0 % (ref 0–1)
BILIRUB SERPL-MCNC: 1.3 MG/DL (ref 0.2–1)
BUN SERPL-MCNC: 14 MG/DL (ref 6–20)
BUN/CREAT SERPL: 24 (ref 12–20)
CALCIUM SERPL-MCNC: 8 MG/DL (ref 8.5–10.1)
CHLORIDE SERPL-SCNC: 113 MMOL/L (ref 97–108)
CO2 SERPL-SCNC: 26 MMOL/L (ref 21–32)
CREAT SERPL-MCNC: 0.58 MG/DL (ref 0.55–1.02)
DIFFERENTIAL METHOD BLD: ABNORMAL
EOSINOPHIL # BLD: 0 K/UL (ref 0–0.4)
EOSINOPHIL NFR BLD: 2 % (ref 0–7)
ERYTHROCYTE [DISTWIDTH] IN BLOOD BY AUTOMATED COUNT: 14.7 % (ref 11.5–14.5)
GLOBULIN SER CALC-MCNC: 3.7 G/DL (ref 2–4)
GLUCOSE SERPL-MCNC: 125 MG/DL (ref 65–100)
HCT VFR BLD AUTO: 31.4 % (ref 35–47)
HGB BLD-MCNC: 9.9 G/DL (ref 11.5–16)
IMM GRANULOCYTES # BLD AUTO: 0 K/UL (ref 0–0.04)
IMM GRANULOCYTES NFR BLD AUTO: 0 % (ref 0–0.5)
LYMPHOCYTES # BLD: 0.4 K/UL (ref 0.8–3.5)
LYMPHOCYTES NFR BLD: 17 % (ref 12–49)
MCH RBC QN AUTO: 31 PG (ref 26–34)
MCHC RBC AUTO-ENTMCNC: 31.5 G/DL (ref 30–36.5)
MCV RBC AUTO: 98.4 FL (ref 80–99)
MONOCYTES # BLD: 0.2 K/UL (ref 0–1)
MONOCYTES NFR BLD: 7 % (ref 5–13)
NEUTS SEG # BLD: 1.7 K/UL (ref 1.8–8)
NEUTS SEG NFR BLD: 74 % (ref 32–75)
NRBC # BLD: 0 K/UL (ref 0–0.01)
NRBC BLD-RTO: 0 PER 100 WBC
NT PRO BNP: 126 PG/ML
PLATELET # BLD AUTO: 57 K/UL (ref 150–400)
PMV BLD AUTO: 12.1 FL (ref 8.9–12.9)
POTASSIUM SERPL-SCNC: 4.2 MMOL/L (ref 3.5–5.1)
PROT SERPL-MCNC: 6.6 G/DL (ref 6.4–8.2)
RBC # BLD AUTO: 3.19 M/UL (ref 3.8–5.2)
RBC MORPH BLD: ABNORMAL
SODIUM SERPL-SCNC: 143 MMOL/L (ref 136–145)
TROPONIN I SERPL HS-MCNC: 14 NG/L (ref 0–51)
WBC # BLD AUTO: 2.3 K/UL (ref 3.6–11)

## 2023-07-24 PROCEDURE — 73562 X-RAY EXAM OF KNEE 3: CPT

## 2023-07-24 PROCEDURE — 83880 ASSAY OF NATRIURETIC PEPTIDE: CPT

## 2023-07-24 PROCEDURE — 84484 ASSAY OF TROPONIN QUANT: CPT

## 2023-07-24 PROCEDURE — 85025 COMPLETE CBC W/AUTO DIFF WBC: CPT

## 2023-07-24 PROCEDURE — 99284 EMERGENCY DEPT VISIT MOD MDM: CPT

## 2023-07-24 PROCEDURE — 71046 X-RAY EXAM CHEST 2 VIEWS: CPT

## 2023-07-24 PROCEDURE — 80053 COMPREHEN METABOLIC PANEL: CPT

## 2023-07-24 PROCEDURE — 36415 COLL VENOUS BLD VENIPUNCTURE: CPT

## 2023-07-24 PROCEDURE — 6370000000 HC RX 637 (ALT 250 FOR IP): Performed by: STUDENT IN AN ORGANIZED HEALTH CARE EDUCATION/TRAINING PROGRAM

## 2023-07-24 RX ORDER — OXYCODONE HYDROCHLORIDE AND ACETAMINOPHEN 5; 325 MG/1; MG/1
1 TABLET ORAL
Status: COMPLETED | OUTPATIENT
Start: 2023-07-24 | End: 2023-07-24

## 2023-07-24 RX ADMIN — OXYCODONE HYDROCHLORIDE AND ACETAMINOPHEN 1 TABLET: 5; 325 TABLET ORAL at 17:32

## 2023-07-24 ASSESSMENT — PAIN - FUNCTIONAL ASSESSMENT: PAIN_FUNCTIONAL_ASSESSMENT: 0-10

## 2023-07-24 ASSESSMENT — ENCOUNTER SYMPTOMS
NAUSEA: 0
CHEST TIGHTNESS: 0
SORE THROAT: 0
ANAL BLEEDING: 0
VOMITING: 0
BLOOD IN STOOL: 0
COLOR CHANGE: 0
DIARRHEA: 0
ABDOMINAL PAIN: 0
ABDOMINAL DISTENTION: 0
BACK PAIN: 0
CONSTIPATION: 0
SHORTNESS OF BREATH: 1
COUGH: 0

## 2023-07-24 ASSESSMENT — PAIN DESCRIPTION - LOCATION: LOCATION: KNEE

## 2023-07-24 ASSESSMENT — PAIN SCALES - GENERAL: PAINLEVEL_OUTOF10: 10

## 2023-07-24 ASSESSMENT — PAIN DESCRIPTION - ORIENTATION: ORIENTATION: RIGHT;LEFT

## 2023-07-24 NOTE — ED NOTES
Patient discharged by provider. Discharge paperwork reviewed and patient denies questions.   Leaves ambulatory and in no apparent distress       Brenda Stallings RN  07/24/23 [id number]

## 2023-07-24 NOTE — ED TRIAGE NOTES
Patient arrives to ed via pov with c/o shortness of breath intermittently x months, intermittent CP, and knee pain. Pt sts she came in today mainly for her knee pain. Pt sts she has been taking meds for her knee pain however it is not helping. Pt sts she has heart issues and does not have medications for her heart.       #[id number] used for triage

## 2023-07-24 NOTE — DISCHARGE INSTRUCTIONS
Continue taking tramadol as prescribed. Use voltaren gel to help with pain relief. Apply ice and please follow-up with your orthopedic specialist of the orthopedic provided.  P

## 2023-07-25 VITALS
HEIGHT: 63 IN | TEMPERATURE: 98.3 F | RESPIRATION RATE: 18 BRPM | HEART RATE: 77 BPM | SYSTOLIC BLOOD PRESSURE: 168 MMHG | OXYGEN SATURATION: 97 % | BODY MASS INDEX: 33.66 KG/M2 | DIASTOLIC BLOOD PRESSURE: 67 MMHG | WEIGHT: 190 LBS

## 2024-03-01 ENCOUNTER — CLINICAL DOCUMENTATION (OUTPATIENT)
Age: 69
End: 2024-03-01

## 2024-03-01 NOTE — PROGRESS NOTES
Pt not seen for ~4 years.  Called concerned about bleeding. She relates that she is seeing blood (vaginal) everytime she urinates.  Has spoken with her PCP re these symptoms and states that this is not being addresses. She denied rectal bleeding or black stool.  She has had some BRB with emesis on occasion.  She has seen no one for her liver condition in the past 4 years.  She is living in [place].  I have offered to get her an appt for hepatology with [facility], she refuses, stating she doesn't want to be seen there.    I have advised that she needs to have post-menopausal vaginal bleeding evaluated with gyn and to proceed to ER for assessment of bleeding, particularly GI bleeding signs given her history of varices. I have explained that our outpatient clinic cannot assess these symptoms urgently, but that we will schedule her for follow-up in the next several weeks but again, she will need to seek urgent care sooner for ongoing bleeding signs/symptoms.  Pt verbalizes understanding.

## 2025-01-24 ENCOUNTER — OFFICE VISIT (OUTPATIENT)
Age: 70
End: 2025-01-24
Payer: MEDICARE

## 2025-01-24 VITALS
OXYGEN SATURATION: 98 % | WEIGHT: 202.1 LBS | HEIGHT: 63 IN | TEMPERATURE: 98.6 F | HEART RATE: 80 BPM | BODY MASS INDEX: 35.81 KG/M2 | SYSTOLIC BLOOD PRESSURE: 198 MMHG | DIASTOLIC BLOOD PRESSURE: 78 MMHG

## 2025-01-24 DIAGNOSIS — K74.69 OTHER CIRRHOSIS OF LIVER (HCC): Primary | ICD-10-CM

## 2025-01-24 LAB
ALBUMIN SERPL-MCNC: 2.5 G/DL (ref 3.5–5)
ALBUMIN/GLOB SERPL: 0.6 (ref 1.1–2.2)
ALP SERPL-CCNC: 178 U/L (ref 45–117)
ALT SERPL-CCNC: 44 U/L (ref 12–78)
ANION GAP SERPL CALC-SCNC: 3 MMOL/L (ref 2–12)
AST SERPL-CCNC: 36 U/L (ref 15–37)
BASOPHILS # BLD: 0.01 K/UL (ref 0–0.1)
BASOPHILS NFR BLD: 0.4 % (ref 0–1)
BILIRUB DIRECT SERPL-MCNC: 0.5 MG/DL (ref 0–0.2)
BILIRUB SERPL-MCNC: 1.1 MG/DL (ref 0.2–1)
BUN SERPL-MCNC: 13 MG/DL (ref 6–20)
BUN/CREAT SERPL: 30 (ref 12–20)
CALCIUM SERPL-MCNC: 7.9 MG/DL (ref 8.5–10.1)
CHLORIDE SERPL-SCNC: 114 MMOL/L (ref 97–108)
CO2 SERPL-SCNC: 26 MMOL/L (ref 21–32)
CREAT SERPL-MCNC: 0.43 MG/DL (ref 0.55–1.02)
DIFFERENTIAL METHOD BLD: ABNORMAL
EOSINOPHIL # BLD: 0.02 K/UL (ref 0–0.4)
EOSINOPHIL NFR BLD: 0.9 % (ref 0–7)
ERYTHROCYTE [DISTWIDTH] IN BLOOD BY AUTOMATED COUNT: 16.7 % (ref 11.5–14.5)
GLOBULIN SER CALC-MCNC: 4 G/DL (ref 2–4)
GLUCOSE SERPL-MCNC: 111 MG/DL (ref 65–100)
HCT VFR BLD AUTO: 30.7 % (ref 35–47)
HGB BLD-MCNC: 8.8 G/DL (ref 11.5–16)
IMM GRANULOCYTES # BLD AUTO: 0.01 K/UL (ref 0–0.04)
IMM GRANULOCYTES NFR BLD AUTO: 0.4 % (ref 0–0.5)
INR PPP: 1.3 (ref 0.9–1.1)
LYMPHOCYTES # BLD: 0.34 K/UL (ref 0.8–3.5)
LYMPHOCYTES NFR BLD: 14 % (ref 12–49)
MCH RBC QN AUTO: 27.1 PG (ref 26–34)
MCHC RBC AUTO-ENTMCNC: 28.7 G/DL (ref 30–36.5)
MCV RBC AUTO: 94.5 FL (ref 80–99)
MONOCYTES # BLD: 0.15 K/UL (ref 0–1)
MONOCYTES NFR BLD: 6.4 % (ref 5–13)
NEUTS SEG # BLD: 1.87 K/UL (ref 1.8–8)
NEUTS SEG NFR BLD: 77.9 % (ref 32–75)
NRBC # BLD: 0 K/UL (ref 0–0.01)
NRBC BLD-RTO: 0 PER 100 WBC
PLATELET # BLD AUTO: 48 K/UL (ref 150–400)
PMV BLD AUTO: 12.5 FL (ref 8.9–12.9)
POTASSIUM SERPL-SCNC: 3.8 MMOL/L (ref 3.5–5.1)
PROT SERPL-MCNC: 6.5 G/DL (ref 6.4–8.2)
PROTHROMBIN TIME: 13.3 SEC (ref 9.2–11.2)
RBC # BLD AUTO: 3.25 M/UL (ref 3.8–5.2)
RBC MORPH BLD: ABNORMAL
SODIUM SERPL-SCNC: 143 MMOL/L (ref 136–145)
WBC # BLD AUTO: 2.4 K/UL (ref 3.6–11)

## 2025-01-24 PROCEDURE — 1090F PRES/ABSN URINE INCON ASSESS: CPT | Performed by: INTERNAL MEDICINE

## 2025-01-24 PROCEDURE — 1123F ACP DISCUSS/DSCN MKR DOCD: CPT | Performed by: INTERNAL MEDICINE

## 2025-01-24 PROCEDURE — G8400 PT W/DXA NO RESULTS DOC: HCPCS | Performed by: INTERNAL MEDICINE

## 2025-01-24 PROCEDURE — G8417 CALC BMI ABV UP PARAM F/U: HCPCS | Performed by: INTERNAL MEDICINE

## 2025-01-24 PROCEDURE — 99214 OFFICE O/P EST MOD 30 MIN: CPT | Performed by: INTERNAL MEDICINE

## 2025-01-24 PROCEDURE — 1159F MED LIST DOCD IN RCRD: CPT | Performed by: INTERNAL MEDICINE

## 2025-01-24 PROCEDURE — 3017F COLORECTAL CA SCREEN DOC REV: CPT | Performed by: INTERNAL MEDICINE

## 2025-01-24 PROCEDURE — 1036F TOBACCO NON-USER: CPT | Performed by: INTERNAL MEDICINE

## 2025-01-24 PROCEDURE — G8427 DOCREV CUR MEDS BY ELIG CLIN: HCPCS | Performed by: INTERNAL MEDICINE

## 2025-01-24 PROCEDURE — 3078F DIAST BP <80 MM HG: CPT | Performed by: INTERNAL MEDICINE

## 2025-01-24 PROCEDURE — 1125F AMNT PAIN NOTED PAIN PRSNT: CPT | Performed by: INTERNAL MEDICINE

## 2025-01-24 PROCEDURE — 3077F SYST BP >= 140 MM HG: CPT | Performed by: INTERNAL MEDICINE

## 2025-01-24 ASSESSMENT — PATIENT HEALTH QUESTIONNAIRE - PHQ9
SUM OF ALL RESPONSES TO PHQ QUESTIONS 1-9: 0
SUM OF ALL RESPONSES TO PHQ9 QUESTIONS 1 & 2: 0
SUM OF ALL RESPONSES TO PHQ QUESTIONS 1-9: 0
SUM OF ALL RESPONSES TO PHQ QUESTIONS 1-9: 0
DEPRESSION UNABLE TO ASSESS: FUNCTIONAL CAPACITY MOTIVATION LIMITS ACCURACY
1. LITTLE INTEREST OR PLEASURE IN DOING THINGS: NOT AT ALL
2. FEELING DOWN, DEPRESSED OR HOPELESS: NOT AT ALL
SUM OF ALL RESPONSES TO PHQ QUESTIONS 1-9: 0

## 2025-01-24 ASSESSMENT — ANXIETY QUESTIONNAIRES
7. FEELING AFRAID AS IF SOMETHING AWFUL MIGHT HAPPEN: NOT AT ALL
1. FEELING NERVOUS, ANXIOUS, OR ON EDGE: NOT AT ALL
GAD7 TOTAL SCORE: 0
5. BEING SO RESTLESS THAT IT IS HARD TO SIT STILL: NOT AT ALL
4. TROUBLE RELAXING: NOT AT ALL
6. BECOMING EASILY ANNOYED OR IRRITABLE: NOT AT ALL
IF YOU CHECKED OFF ANY PROBLEMS ON THIS QUESTIONNAIRE, HOW DIFFICULT HAVE THESE PROBLEMS MADE IT FOR YOU TO DO YOUR WORK, TAKE CARE OF THINGS AT HOME, OR GET ALONG WITH OTHER PEOPLE: NOT DIFFICULT AT ALL
3. WORRYING TOO MUCH ABOUT DIFFERENT THINGS: NOT AT ALL
2. NOT BEING ABLE TO STOP OR CONTROL WORRYING: NOT AT ALL

## 2025-01-24 NOTE — PROGRESS NOTES
Chief Complaint   Patient presents with    New Patient     New PT   Records are in EPIC     Vitals:    01/24/25 1420   Pulse: 80   Temp: 98.6 °F (37 °C)   TempSrc: Temporal   SpO2: 98%   Weight: 91.7 kg (202 lb 1.6 oz)   Height: 1.6 m (5' 3\")     .  \"Have you been to the ER, urgent care clinic since your last visit?  Hospitalized since your last visit?\"    YES - When: approximately 2 months  ago.  Where and Why: [facility] .    “Have you seen or consulted any other health care providers outside of [facility] since your last visit?”    NO    Have you had a mammogram?”   NO    No breast cancer screening on file             Click Here for Release of Records Request    
for sore throat, painful swallowing.   Respiratory: Negative for cough, hemoptysis, SOB.   Cardiology: Negative for chest pain, palpitations.  GI:  Negative for constipation or diarrhea.  : Negative for urinary frequency, dysuria, hematuria, nocturia. Complains of some low pelvic/suprapubic pain.   Skin: Negative for rash.  Hematology: Negative for easy bruising, blood clots.    Musculo-skeletal: Negative for back pain, muscle pain, weakness.  Positive for severe arthritis, knees - poor mobility.   Neurologic: Negative for dizziness, vertigo, memory problems not related to HE.  Positive for recent headaches.  Psychology: Negative for anxiety, depression.     FAMILY HISTORY:  The father  of accident.    The patient has no knowledge of the mother's medical condition.    There is no family history of liver disease.      SOCIAL HISTORY:  The patient is .    The patient has 1 child.    The patient has never used tobacco products.    The patient has never consumed significant amounts of alcohol.    The patient used to work CNA.  The patient has not worked since .      PHYSICAL EXAMINATION:  /57 (BP 1 Location: Left arm, BP Patient Position: Sitting)   Pulse 75   Temp 98.8 °F (37.1 °C) (Tympanic)   SpO2 98%   General: No acute distress. Seated in wheelchair.   Eyes: Sclera anicteric.   ENT: No oral lesions.  Thyroid normal.  Nodes: No adenopathy.   Skin: No spider angiomata.  No jaundice.  No palmar erythema.  Respiratory: Lungs clear to auscultation.   Cardiovascular: Regular heart rate.  No murmurs.  No JVD.  Abdomen: Soft non-tender.  Liver size normal to percussion/palpation.  Spleen not palpable. No obvious ascites.  Extremities: 1+ LE edema.  No muscle wasting.  No gross arthritic changes.  Neurologic: Alert and oriented.  Cranial nerves grossly intact.  No asterixis.    LABORATORY STUDIES:   Latest Ref Rng 2023   MICHAEL - Routine Labs      WBC 3.6 - 11.0 K/uL 2.3 (L)  2.4 (L)

## 2025-01-27 DIAGNOSIS — Z12.11 COLON CANCER SCREENING: Primary | ICD-10-CM

## 2025-01-27 RX ORDER — POLYETHYLENE GLYCOL 3350, SODIUM SULFATE ANHYDROUS, SODIUM BICARBONATE, SODIUM CHLORIDE, POTASSIUM CHLORIDE 236; 22.74; 6.74; 5.86; 2.97 G/4L; G/4L; G/4L; G/4L; G/4L
4 POWDER, FOR SOLUTION ORAL ONCE
Qty: 4000 ML | Refills: 0 | Status: SHIPPED | OUTPATIENT
Start: 2025-01-27 | End: 2025-01-27

## 2025-01-27 RX ORDER — BISACODYL 5 MG/1
5 TABLET, DELAYED RELEASE ORAL DAILY PRN
Qty: 4 TABLET | Refills: 0 | Status: SHIPPED | OUTPATIENT
Start: 2025-01-27

## 2025-01-27 NOTE — PROGRESS NOTES
Chart reviewed. Patient is appropriate for colonoscopy for colon cancer screening  Prep type: One day prep. Prescribed today.  Anticoagulants: none  Z12.11

## 2025-02-06 LAB
AFP L3 MFR SERPL: 56 % (ref 0–9.9)
AFP SERPL-MCNC: 24.5 NG/ML (ref 0–9.2)

## 2025-02-10 ENCOUNTER — TELEPHONE (OUTPATIENT)
Age: 70
End: 2025-02-10

## 2025-02-10 ENCOUNTER — CLINICAL DOCUMENTATION (OUTPATIENT)
Age: 70
End: 2025-02-10

## 2025-02-10 NOTE — TELEPHONE ENCOUNTER
----- Message from Dr. Phillip Porras MD sent at 2/8/2025  5:41 PM EST -----  Regarding: AFP and L3% elevated.  Concerning for a liver cancer.  Cancel US and schedule and have her get MRI.  I have ordered the MRI.  FU MLS as scheduled.  MLS      2/10/25@1322 Spoke w/patient. At this time patient on the way to [facility] for abdominal pain/SOB--just not feeling good. Ask patient to call once out of the hospital--patient verbal understanding. (KF)    2/19/25@0849 Attempt to call patient. No answer left detail message. (KF)    2/24/25@1429 Attempt to reach patient twice. Called son--no answer left voice message. Unable to reach patient. D/C Us per MLS. Patient still need MRI. (KF)    2/25/25@1610 Attempt to call 4th time no answer on mobile number. Tried patient spouse no answer also--left voice message. Unable to reach patient. (KF)

## 2025-02-28 ENCOUNTER — TELEPHONE (OUTPATIENT)
Age: 70
End: 2025-02-28

## 2025-02-28 NOTE — TELEPHONE ENCOUNTER
----- Message from Dr. Geri Willis MD sent at 1/27/2025 11:26 AM EST -----  Regarding: RE: Cirrhosis.  Needs EGD and colon for ? hematemesis and melena.  Chart reviewed. Patient is appropriate for colonoscopy for colon cancer screening  Prep type: One day prep. Prescribed today.  Anticoagulants: none  Z12.11  ----- Message -----  From: Shahana Braga  Sent: 1/27/2025  10:43 AM EST  To: Donna Louise; Geri Willis MD  Subject: FW: Cirrhosis.  Needs EGD and colon for ? he#    Please advise regarding prep for this patient  ----- Message -----  From: Phillip Porras MD  Sent: 1/24/2025   2:59 PM EST  To: Shahana Willis MD  Subject: Cirrhosis.  Needs EGD and colon for ? hemate#

## 2025-03-17 ENCOUNTER — PREP FOR PROCEDURE (OUTPATIENT)
Age: 70
End: 2025-03-17

## 2025-03-17 DIAGNOSIS — Z12.11 SCREEN FOR COLON CANCER: ICD-10-CM

## 2025-03-17 DIAGNOSIS — K74.69 OTHER CIRRHOSIS OF LIVER (HCC): ICD-10-CM

## 2025-03-28 ENCOUNTER — TELEPHONE (OUTPATIENT)
Age: 70
End: 2025-03-28

## 2025-03-28 NOTE — TELEPHONE ENCOUNTER
3/28/25 1105am: Attempted to remind pt of colonoscopy and EGD scheduled on next 4/4/25. L/m on v/m. Advised pt to give me a call with any questions. SQ

## 2025-04-16 PROBLEM — Z12.11 SCREEN FOR COLON CANCER: Status: RESOLVED | Noted: 2025-03-17 | Resolved: 2025-04-16

## (undated) DEVICE — KIT IV STRT W CHLORAPREP PD 1ML

## (undated) DEVICE — BAG BELONG PT PERS CLEAR HANDL

## (undated) DEVICE — FORCEPS BX L240CM JAW DIA2.8MM L CAP W/ NDL MIC MESH TOOTH

## (undated) DEVICE — KENDALL RADIOLUCENT FOAM MONITORING ELECTRODE -RECTANGULAR SHAPE: Brand: KENDALL

## (undated) DEVICE — BW-412T DISP COMBO CLEANING BRUSH: Brand: SINGLE USE COMBINATION CLEANING BRUSH

## (undated) DEVICE — Z DISCONTINUED NO SUB IDED SET EXTN W/ 4 W STPCOCK M SPIN LOK 36IN

## (undated) DEVICE — ENDO CARRY-ON PROCEDURE KIT INCLUDES ENZYMATIC SPONGE, GAUZE, BIOHAZARD LABEL, TRAY, LUBRICANT, DIRTY SCOPE LABEL, WATER LABEL, TRAY, DRAWSTRING PAD, AND DEFENDO 4-PIECE KIT.: Brand: ENDO CARRY-ON PROCEDURE KIT

## (undated) DEVICE — BITE BLK ENDOSCP AD 54FR GRN POLYETH ENDOSCP W STRP SLD

## (undated) DEVICE — CATH IV AUTOGRD BC BLU 22GA 25 -- INSYTE

## (undated) DEVICE — CONNECTOR TBNG AUX H2O JET DISP FOR OLY 160/180 SER

## (undated) DEVICE — SYRINGE MED 20ML STD CLR PLAS LUERLOCK TIP N CTRL DISP

## (undated) DEVICE — CONTAINER SPEC 20 ML LID NEUT BUFF FORMALIN 10 % POLYPR STS

## (undated) DEVICE — Z CONVERTED USE 2274299 CUFF BLD PRESSURE LNG MED AD 25-35 CM ARM FLEXIPORT DISP

## (undated) DEVICE — 1200 GUARD II KIT W/5MM TUBE W/O VAC TUBE: Brand: GUARDIAN

## (undated) DEVICE — SOLIDIFIER FLUID 3000 CC ABSORB

## (undated) DEVICE — TUBING HYDR IRR --

## (undated) DEVICE — NEEDLE HYPO 18GA L1.5IN PNK S STL HUB POLYPR SHLD REG BVL

## (undated) DEVICE — AIRLIFE™ U/CONNECT-IT OXYGEN TUBING 7 FEET (2.1 M) CRUSH-RESISTANT OXYGEN TUBING, VINYL TIPPED: Brand: AIRLIFE™

## (undated) DEVICE — WRISTBAND ID AD W1XL11.5IN RED POLY ALRG PREPRINTED PERM

## (undated) DEVICE — SET EXTN TBNG L BOR 4 W STPCOCK ST 32IN PRIMING VOL 6ML

## (undated) DEVICE — NEONATAL-ADULT SPO2 SENSOR: Brand: NELLCOR

## (undated) DEVICE — CANN NASAL O2 CAPNOGRAPHY AD -- FILTERLINE

## (undated) DEVICE — SET ADMIN 16ML TBNG L100IN 2 Y INJ SITE IV PIGGY BK DISP

## (undated) DEVICE — MULTIPLE BAND LIGATOR: Brand: SPEEDBAND SUPERVIEW SUPER 7

## (undated) DEVICE — QUILTED PREMIUM COMFORT UNDERPAD,EXTRA HEAVY: Brand: WINGS

## (undated) DEVICE — BAG SPEC BIOHZD LF 2MIL 6X10IN -- CONVERT TO ITEM 357326

## (undated) DEVICE — CUFF BLD PRSS AD SM SZ 10 FOR 20-26CM LIMB VLY SFT W/O TUBE

## (undated) DEVICE — Z DISCONTINUED USE 2751540 TUBING IRRIG L10IN DISP PMP ENDOGATOR